# Patient Record
Sex: MALE | Race: WHITE | Employment: UNEMPLOYED | URBAN - METROPOLITAN AREA
[De-identification: names, ages, dates, MRNs, and addresses within clinical notes are randomized per-mention and may not be internally consistent; named-entity substitution may affect disease eponyms.]

---

## 2021-01-01 ENCOUNTER — OFFICE VISIT (OUTPATIENT)
Dept: PULMONOLOGY | Age: 86
End: 2021-01-01
Payer: MEDICARE

## 2021-01-01 ENCOUNTER — APPOINTMENT (OUTPATIENT)
Dept: ULTRASOUND IMAGING | Age: 86
DRG: 377 | End: 2021-01-01
Payer: MEDICARE

## 2021-01-01 ENCOUNTER — APPOINTMENT (OUTPATIENT)
Dept: GENERAL RADIOLOGY | Age: 86
DRG: 377 | End: 2021-01-01
Payer: MEDICARE

## 2021-01-01 ENCOUNTER — ANESTHESIA EVENT (OUTPATIENT)
Dept: ENDOSCOPY | Age: 86
DRG: 377 | End: 2021-01-01
Payer: MEDICARE

## 2021-01-01 ENCOUNTER — HOSPITAL ENCOUNTER (INPATIENT)
Age: 86
LOS: 4 days | Discharge: HOSPICE | DRG: 640 | End: 2021-05-31
Attending: EMERGENCY MEDICINE | Admitting: INTERNAL MEDICINE
Payer: MEDICARE

## 2021-01-01 ENCOUNTER — APPOINTMENT (OUTPATIENT)
Dept: ULTRASOUND IMAGING | Age: 86
DRG: 640 | End: 2021-01-01
Payer: MEDICARE

## 2021-01-01 ENCOUNTER — APPOINTMENT (OUTPATIENT)
Dept: GENERAL RADIOLOGY | Age: 86
DRG: 871 | End: 2021-01-01
Payer: MEDICARE

## 2021-01-01 ENCOUNTER — NURSE ONLY (OUTPATIENT)
Dept: CARDIOLOGY CLINIC | Age: 86
End: 2021-01-01
Payer: MEDICARE

## 2021-01-01 ENCOUNTER — TELEPHONE (OUTPATIENT)
Dept: PULMONOLOGY | Age: 86
End: 2021-01-01

## 2021-01-01 ENCOUNTER — HOSPITAL ENCOUNTER (OUTPATIENT)
Age: 86
Discharge: HOME | End: 2021-04-20
Payer: MEDICARE

## 2021-01-01 ENCOUNTER — HOSPITAL ENCOUNTER (OUTPATIENT)
Dept: GENERAL RADIOLOGY | Age: 86
End: 2021-01-01
Payer: MEDICARE

## 2021-01-01 ENCOUNTER — HOSPITAL ENCOUNTER (OUTPATIENT)
Dept: GENERAL RADIOLOGY | Age: 86
Discharge: HOME | End: 2021-04-27
Payer: MEDICARE

## 2021-01-01 ENCOUNTER — HOSPITAL ENCOUNTER (OUTPATIENT)
Dept: WOMENS IMAGING | Age: 86
Discharge: HOME | End: 2021-04-22
Payer: MEDICARE

## 2021-01-01 ENCOUNTER — APPOINTMENT (OUTPATIENT)
Dept: CT IMAGING | Age: 86
DRG: 640 | End: 2021-01-01
Payer: MEDICARE

## 2021-01-01 ENCOUNTER — HOSPITAL ENCOUNTER (INPATIENT)
Age: 86
LOS: 5 days | Discharge: HOME | DRG: 377 | End: 2021-03-01
Attending: EMERGENCY MEDICINE | Admitting: FAMILY MEDICINE
Payer: MEDICARE

## 2021-01-01 ENCOUNTER — OFFICE VISIT (OUTPATIENT)
Dept: CARDIOLOGY CLINIC | Age: 86
End: 2021-01-01
Payer: MEDICARE

## 2021-01-01 ENCOUNTER — APPOINTMENT (OUTPATIENT)
Dept: CT IMAGING | Age: 86
DRG: 871 | End: 2021-01-01
Payer: MEDICARE

## 2021-01-01 ENCOUNTER — TELEPHONE (OUTPATIENT)
Dept: OTHER | Facility: CLINIC | Age: 86
End: 2021-01-01

## 2021-01-01 ENCOUNTER — HOSPITAL ENCOUNTER (INPATIENT)
Age: 86
LOS: 8 days | Discharge: HOME | DRG: 871 | End: 2021-05-13
Attending: EMERGENCY MEDICINE | Admitting: INTERNAL MEDICINE
Payer: MEDICARE

## 2021-01-01 ENCOUNTER — HOSPITAL ENCOUNTER (OUTPATIENT)
Dept: GENERAL RADIOLOGY | Age: 86
Discharge: HOME | End: 2021-03-09
Payer: MEDICARE

## 2021-01-01 ENCOUNTER — CARE COORDINATION (OUTPATIENT)
Dept: CASE MANAGEMENT | Age: 86
End: 2021-01-01

## 2021-01-01 ENCOUNTER — HOSPITAL ENCOUNTER (OUTPATIENT)
Dept: GENERAL RADIOLOGY | Age: 86
Discharge: HOME | End: 2021-04-20
Payer: MEDICARE

## 2021-01-01 ENCOUNTER — ANESTHESIA (OUTPATIENT)
Dept: ENDOSCOPY | Age: 86
DRG: 377 | End: 2021-01-01
Payer: MEDICARE

## 2021-01-01 ENCOUNTER — CLINICAL DOCUMENTATION (OUTPATIENT)
Dept: OTHER | Age: 86
End: 2021-01-01

## 2021-01-01 ENCOUNTER — FOLLOWUP TELEPHONE ENCOUNTER (OUTPATIENT)
Dept: MEDSURG UNIT | Age: 86
End: 2021-01-01

## 2021-01-01 VITALS
RESPIRATION RATE: 18 BRPM | OXYGEN SATURATION: 96 % | TEMPERATURE: 96.2 F | HEIGHT: 65 IN | BODY MASS INDEX: 18.8 KG/M2 | HEART RATE: 66 BPM | DIASTOLIC BLOOD PRESSURE: 78 MMHG | SYSTOLIC BLOOD PRESSURE: 163 MMHG

## 2021-01-01 VITALS
HEART RATE: 63 BPM | WEIGHT: 130.07 LBS | HEIGHT: 65 IN | TEMPERATURE: 98.1 F | OXYGEN SATURATION: 97 % | BODY MASS INDEX: 21.67 KG/M2 | DIASTOLIC BLOOD PRESSURE: 67 MMHG | RESPIRATION RATE: 16 BRPM | SYSTOLIC BLOOD PRESSURE: 150 MMHG

## 2021-01-01 VITALS — OXYGEN SATURATION: 96 % | SYSTOLIC BLOOD PRESSURE: 87 MMHG | DIASTOLIC BLOOD PRESSURE: 46 MMHG

## 2021-01-01 VITALS
BODY MASS INDEX: 21.24 KG/M2 | DIASTOLIC BLOOD PRESSURE: 64 MMHG | HEIGHT: 65 IN | OXYGEN SATURATION: 90 % | SYSTOLIC BLOOD PRESSURE: 126 MMHG | WEIGHT: 127.5 LBS | HEART RATE: 60 BPM

## 2021-01-01 VITALS
DIASTOLIC BLOOD PRESSURE: 82 MMHG | HEIGHT: 65 IN | SYSTOLIC BLOOD PRESSURE: 159 MMHG | HEART RATE: 86 BPM | WEIGHT: 113 LBS | RESPIRATION RATE: 16 BRPM | OXYGEN SATURATION: 94 % | TEMPERATURE: 97.1 F | BODY MASS INDEX: 18.83 KG/M2

## 2021-01-01 VITALS
SYSTOLIC BLOOD PRESSURE: 153 MMHG | OXYGEN SATURATION: 97 % | HEART RATE: 61 BPM | DIASTOLIC BLOOD PRESSURE: 67 MMHG | WEIGHT: 139.4 LBS | BODY MASS INDEX: 23.22 KG/M2 | HEIGHT: 65 IN

## 2021-01-01 DIAGNOSIS — I10 ESSENTIAL HYPERTENSION: ICD-10-CM

## 2021-01-01 DIAGNOSIS — K92.2 GASTROINTESTINAL HEMORRHAGE, UNSPECIFIED GASTROINTESTINAL HEMORRHAGE TYPE: Primary | ICD-10-CM

## 2021-01-01 DIAGNOSIS — T83.511A URINARY TRACT INFECTION ASSOCIATED WITH INDWELLING URETHRAL CATHETER, INITIAL ENCOUNTER (HCC): ICD-10-CM

## 2021-01-01 DIAGNOSIS — M81.0 OSTEOPOROSIS, UNSPECIFIED OSTEOPOROSIS TYPE, UNSPECIFIED PATHOLOGICAL FRACTURE PRESENCE: ICD-10-CM

## 2021-01-01 DIAGNOSIS — A41.9 SEVERE SEPSIS (HCC): Primary | ICD-10-CM

## 2021-01-01 DIAGNOSIS — J18.9 MULTIFOCAL PNEUMONIA: ICD-10-CM

## 2021-01-01 DIAGNOSIS — R05.9 COUGH: ICD-10-CM

## 2021-01-01 DIAGNOSIS — R41.82 ALTERED MENTAL STATUS, UNSPECIFIED ALTERED MENTAL STATUS TYPE: Primary | ICD-10-CM

## 2021-01-01 DIAGNOSIS — N18.9 CHRONIC RENAL FAILURE, UNSPECIFIED CKD STAGE: ICD-10-CM

## 2021-01-01 DIAGNOSIS — I50.9 ACUTE ON CHRONIC CONGESTIVE HEART FAILURE, UNSPECIFIED HEART FAILURE TYPE (HCC): ICD-10-CM

## 2021-01-01 DIAGNOSIS — N17.9 ACUTE RENAL FAILURE, UNSPECIFIED ACUTE RENAL FAILURE TYPE (HCC): ICD-10-CM

## 2021-01-01 DIAGNOSIS — J18.9 PNEUMONIA DUE TO INFECTIOUS ORGANISM, UNSPECIFIED LATERALITY, UNSPECIFIED PART OF LUNG: ICD-10-CM

## 2021-01-01 DIAGNOSIS — R93.89 INFILTRATE NOTED ON IMAGING STUDY: ICD-10-CM

## 2021-01-01 DIAGNOSIS — R41.89 UNRESPONSIVE STATE: ICD-10-CM

## 2021-01-01 DIAGNOSIS — G89.29 CHRONIC LOW BACK PAIN, UNSPECIFIED BACK PAIN LATERALITY, UNSPECIFIED WHETHER SCIATICA PRESENT: ICD-10-CM

## 2021-01-01 DIAGNOSIS — I27.20 PULMONARY HTN (HCC): ICD-10-CM

## 2021-01-01 DIAGNOSIS — Z95.0 PACEMAKER: Primary | ICD-10-CM

## 2021-01-01 DIAGNOSIS — R65.20 SEVERE SEPSIS (HCC): Primary | ICD-10-CM

## 2021-01-01 DIAGNOSIS — R06.02 SOB (SHORTNESS OF BREATH): ICD-10-CM

## 2021-01-01 DIAGNOSIS — I25.10 CORONARY ARTERY DISEASE INVOLVING NATIVE CORONARY ARTERY OF NATIVE HEART WITHOUT ANGINA PECTORIS: Primary | ICD-10-CM

## 2021-01-01 DIAGNOSIS — N39.0 URINARY TRACT INFECTION ASSOCIATED WITH INDWELLING URETHRAL CATHETER, INITIAL ENCOUNTER (HCC): ICD-10-CM

## 2021-01-01 DIAGNOSIS — R77.8 ELEVATED TROPONIN: ICD-10-CM

## 2021-01-01 DIAGNOSIS — M54.50 CHRONIC LOW BACK PAIN, UNSPECIFIED BACK PAIN LATERALITY, UNSPECIFIED WHETHER SCIATICA PRESENT: ICD-10-CM

## 2021-01-01 DIAGNOSIS — R93.89 INFILTRATE NOTED ON IMAGING STUDY: Primary | ICD-10-CM

## 2021-01-01 DIAGNOSIS — E83.52 HYPERCALCEMIA: ICD-10-CM

## 2021-01-01 LAB
A/G RATIO: 0.7 (ref 1.1–2.2)
A/G RATIO: 0.8 (ref 1.1–2.2)
A/G RATIO: 1.3 (ref 1.1–2.2)
ABO/RH: NORMAL
ALBUMIN SERPL-MCNC: 2.7 G/DL (ref 3.4–5)
ALBUMIN SERPL-MCNC: 2.9 G/DL (ref 3.1–4.9)
ALBUMIN SERPL-MCNC: 2.9 G/DL (ref 3.4–5)
ALBUMIN SERPL-MCNC: 3.3 G/DL (ref 3.4–5)
ALBUMIN SERPL-MCNC: 3.3 G/DL (ref 3.4–5)
ALBUMIN SERPL-MCNC: 3.4 G/DL (ref 3.4–5)
ALBUMIN SERPL-MCNC: 3.4 G/DL (ref 3.4–5)
ALBUMIN SERPL-MCNC: 3.6 G/DL (ref 3.4–5)
ALBUMIN SERPL-MCNC: 3.7 G/DL (ref 3.4–5)
ALBUMIN SERPL-MCNC: 3.7 G/DL (ref 3.4–5)
ALP BLD-CCNC: 57 U/L (ref 40–129)
ALP BLD-CCNC: 70 U/L (ref 40–129)
ALP BLD-CCNC: 86 U/L (ref 40–129)
ALPHA-1-GLOBULIN: 0.4 G/DL (ref 0.2–0.4)
ALPHA-2-GLOBULIN: 1 G/DL (ref 0.4–1.1)
ALT SERPL-CCNC: 13 U/L (ref 10–40)
ALT SERPL-CCNC: 16 U/L (ref 10–40)
ALT SERPL-CCNC: 29 U/L (ref 10–40)
AMPHETAMINE SCREEN, URINE: NORMAL
ANION GAP SERPL CALCULATED.3IONS-SCNC: 10 MMOL/L (ref 3–16)
ANION GAP SERPL CALCULATED.3IONS-SCNC: 11 MMOL/L (ref 3–16)
ANION GAP SERPL CALCULATED.3IONS-SCNC: 11 MMOL/L (ref 3–16)
ANION GAP SERPL CALCULATED.3IONS-SCNC: 12 MMOL/L (ref 3–16)
ANION GAP SERPL CALCULATED.3IONS-SCNC: 13 MMOL/L (ref 3–16)
ANION GAP SERPL CALCULATED.3IONS-SCNC: 13 MMOL/L (ref 3–16)
ANION GAP SERPL CALCULATED.3IONS-SCNC: 14 MMOL/L (ref 3–16)
ANION GAP SERPL CALCULATED.3IONS-SCNC: 15 MMOL/L (ref 3–16)
ANION GAP SERPL CALCULATED.3IONS-SCNC: 15 MMOL/L (ref 3–16)
ANION GAP SERPL CALCULATED.3IONS-SCNC: 16 MMOL/L (ref 3–16)
ANION GAP SERPL CALCULATED.3IONS-SCNC: 17 MMOL/L (ref 3–16)
ANION GAP SERPL CALCULATED.3IONS-SCNC: 9 MMOL/L (ref 3–16)
ANTIBODY SCREEN: NORMAL
APTT: 30.1 SEC (ref 24.2–36.2)
AST SERPL-CCNC: 31 U/L (ref 15–37)
AST SERPL-CCNC: 38 U/L (ref 15–37)
AST SERPL-CCNC: 44 U/L (ref 15–37)
BACTERIA: ABNORMAL /HPF
BACTERIA: ABNORMAL /HPF
BARBITURATE SCREEN URINE: NORMAL
BASE EXCESS VENOUS: -0.8 MMOL/L (ref -3–3)
BASOPHILS ABSOLUTE: 0 K/UL (ref 0–0.2)
BASOPHILS ABSOLUTE: 0.1 K/UL (ref 0–0.2)
BASOPHILS RELATIVE PERCENT: 0.3 %
BASOPHILS RELATIVE PERCENT: 0.4 %
BASOPHILS RELATIVE PERCENT: 0.4 %
BASOPHILS RELATIVE PERCENT: 0.6 %
BASOPHILS RELATIVE PERCENT: 0.7 %
BASOPHILS RELATIVE PERCENT: 0.8 %
BASOPHILS RELATIVE PERCENT: 0.9 %
BASOPHILS RELATIVE PERCENT: 1 %
BASOPHILS RELATIVE PERCENT: 1.3 %
BENZODIAZEPINE SCREEN, URINE: NORMAL
BETA GLOBULIN: 2.1 G/DL (ref 0.9–1.6)
BILIRUB SERPL-MCNC: 0.4 MG/DL (ref 0–1)
BILIRUB SERPL-MCNC: 0.4 MG/DL (ref 0–1)
BILIRUB SERPL-MCNC: 0.5 MG/DL (ref 0–1)
BILIRUBIN URINE: NEGATIVE
BLOOD BANK DISPENSE STATUS: NORMAL
BLOOD BANK DISPENSE STATUS: NORMAL
BLOOD BANK PRODUCT CODE: NORMAL
BLOOD BANK PRODUCT CODE: NORMAL
BLOOD CULTURE, ROUTINE: NORMAL
BLOOD CULTURE, ROUTINE: NORMAL
BLOOD, URINE: ABNORMAL
BPU ID: NORMAL
BPU ID: NORMAL
BUN BLDV-MCNC: 42 MG/DL (ref 7–20)
BUN BLDV-MCNC: 45 MG/DL (ref 7–20)
BUN BLDV-MCNC: 46 MG/DL (ref 7–20)
BUN BLDV-MCNC: 50 MG/DL (ref 7–20)
BUN BLDV-MCNC: 51 MG/DL (ref 7–20)
BUN BLDV-MCNC: 51 MG/DL (ref 7–20)
BUN BLDV-MCNC: 52 MG/DL (ref 7–20)
BUN BLDV-MCNC: 54 MG/DL (ref 7–20)
BUN BLDV-MCNC: 55 MG/DL (ref 7–20)
BUN BLDV-MCNC: 57 MG/DL (ref 7–20)
BUN BLDV-MCNC: 58 MG/DL (ref 7–20)
BUN BLDV-MCNC: 61 MG/DL (ref 7–20)
BUN BLDV-MCNC: 63 MG/DL (ref 7–20)
BUN BLDV-MCNC: 63 MG/DL (ref 7–20)
BUN BLDV-MCNC: 66 MG/DL (ref 7–20)
BUN BLDV-MCNC: 67 MG/DL (ref 7–20)
BUN BLDV-MCNC: 68 MG/DL (ref 7–20)
BUN BLDV-MCNC: 69 MG/DL (ref 7–20)
BUN BLDV-MCNC: 69 MG/DL (ref 7–20)
BUN BLDV-MCNC: 70 MG/DL (ref 7–20)
C-REACTIVE PROTEIN: 144 MG/L (ref 0–5.1)
CALCIUM IONIZED: 1.35 MMOL/L (ref 1.12–1.32)
CALCIUM IONIZED: 1.39 MMOL/L (ref 1.12–1.32)
CALCIUM IONIZED: 1.59 MMOL/L (ref 1.12–1.32)
CALCIUM SERPL-MCNC: 10.2 MG/DL (ref 8.3–10.6)
CALCIUM SERPL-MCNC: 10.4 MG/DL (ref 8.3–10.6)
CALCIUM SERPL-MCNC: 10.6 MG/DL (ref 8.3–10.6)
CALCIUM SERPL-MCNC: 10.6 MG/DL (ref 8.3–10.6)
CALCIUM SERPL-MCNC: 10.8 MG/DL (ref 8.3–10.6)
CALCIUM SERPL-MCNC: 11.1 MG/DL (ref 8.3–10.6)
CALCIUM SERPL-MCNC: 11.7 MG/DL (ref 8.3–10.6)
CALCIUM SERPL-MCNC: 11.7 MG/DL (ref 8.3–10.6)
CALCIUM SERPL-MCNC: 11.9 MG/DL (ref 8.3–10.6)
CALCIUM SERPL-MCNC: 11.9 MG/DL (ref 8.3–10.6)
CALCIUM SERPL-MCNC: 12.2 MG/DL (ref 8.3–10.6)
CALCIUM SERPL-MCNC: 12.3 MG/DL (ref 8.3–10.6)
CALCIUM SERPL-MCNC: 14.4 MG/DL (ref 8.3–10.6)
CALCIUM SERPL-MCNC: 8.6 MG/DL (ref 8.3–10.6)
CALCIUM SERPL-MCNC: 8.6 MG/DL (ref 8.3–10.6)
CALCIUM SERPL-MCNC: 8.8 MG/DL (ref 8.3–10.6)
CALCIUM SERPL-MCNC: 8.9 MG/DL (ref 8.3–10.6)
CALCIUM SERPL-MCNC: 9.3 MG/DL (ref 8.3–10.6)
CANNABINOID SCREEN URINE: NORMAL
CARBOXYHEMOGLOBIN: 1.6 % (ref 0–1.5)
CHLORIDE BLD-SCNC: 101 MMOL/L (ref 99–110)
CHLORIDE BLD-SCNC: 102 MMOL/L (ref 99–110)
CHLORIDE BLD-SCNC: 103 MMOL/L (ref 99–110)
CHLORIDE BLD-SCNC: 104 MMOL/L (ref 99–110)
CHLORIDE BLD-SCNC: 105 MMOL/L (ref 99–110)
CHLORIDE BLD-SCNC: 107 MMOL/L (ref 99–110)
CHLORIDE BLD-SCNC: 115 MMOL/L (ref 99–110)
CHLORIDE BLD-SCNC: 119 MMOL/L (ref 99–110)
CHLORIDE BLD-SCNC: 120 MMOL/L (ref 99–110)
CHLORIDE BLD-SCNC: 96 MMOL/L (ref 99–110)
CHLORIDE BLD-SCNC: 98 MMOL/L (ref 99–110)
CLARITY: ABNORMAL
CLARITY: ABNORMAL
CLARITY: CLEAR
CO2: 19 MMOL/L (ref 21–32)
CO2: 19 MMOL/L (ref 21–32)
CO2: 20 MMOL/L (ref 21–32)
CO2: 22 MMOL/L (ref 21–32)
CO2: 23 MMOL/L (ref 21–32)
CO2: 24 MMOL/L (ref 21–32)
CO2: 24 MMOL/L (ref 21–32)
CO2: 25 MMOL/L (ref 21–32)
CO2: 26 MMOL/L (ref 21–32)
CO2: 27 MMOL/L (ref 21–32)
CO2: 28 MMOL/L (ref 21–32)
CO2: 29 MMOL/L (ref 21–32)
COCAINE METABOLITE SCREEN URINE: NORMAL
COLOR: ABNORMAL
COLOR: YELLOW
COLOR: YELLOW
CREAT SERPL-MCNC: 3 MG/DL (ref 0.8–1.3)
CREAT SERPL-MCNC: 3 MG/DL (ref 0.8–1.3)
CREAT SERPL-MCNC: 3.1 MG/DL (ref 0.8–1.3)
CREAT SERPL-MCNC: 3.2 MG/DL (ref 0.8–1.3)
CREAT SERPL-MCNC: 3.3 MG/DL (ref 0.8–1.3)
CREAT SERPL-MCNC: 3.6 MG/DL (ref 0.8–1.3)
CREAT SERPL-MCNC: 3.7 MG/DL (ref 0.8–1.3)
CREAT SERPL-MCNC: 3.7 MG/DL (ref 0.8–1.3)
CREAT SERPL-MCNC: 4 MG/DL (ref 0.8–1.3)
CREAT SERPL-MCNC: 4.1 MG/DL (ref 0.8–1.3)
CREAT SERPL-MCNC: 4.2 MG/DL (ref 0.8–1.3)
CREAT SERPL-MCNC: 4.2 MG/DL (ref 0.8–1.3)
CREATININE URINE: 61 MG/DL (ref 39–259)
CREATININE URINE: 65 MG/DL (ref 39–259)
CULTURE, BLOOD 2: NORMAL
CULTURE, BLOOD 2: NORMAL
D DIMER: 900 NG/ML DDU (ref 0–229)
DESCRIPTION BLOOD BANK: NORMAL
DESCRIPTION BLOOD BANK: NORMAL
EKG ATRIAL RATE: 67 BPM
EKG ATRIAL RATE: 88 BPM
EKG DIAGNOSIS: NORMAL
EKG DIAGNOSIS: NORMAL
EKG P AXIS: 15 DEGREES
EKG P-R INTERVAL: 148 MS
EKG Q-T INTERVAL: 408 MS
EKG Q-T INTERVAL: 474 MS
EKG QRS DURATION: 140 MS
EKG QRS DURATION: 158 MS
EKG QTC CALCULATION (BAZETT): 488 MS
EKG QTC CALCULATION (BAZETT): 500 MS
EKG R AXIS: -85 DEGREES
EKG R AXIS: 266 DEGREES
EKG T AXIS: 34 DEGREES
EKG T AXIS: 83 DEGREES
EKG VENTRICULAR RATE: 67 BPM
EKG VENTRICULAR RATE: 86 BPM
EOSINOPHILS ABSOLUTE: 0 K/UL (ref 0–0.6)
EOSINOPHILS ABSOLUTE: 0.1 K/UL (ref 0–0.6)
EOSINOPHILS RELATIVE PERCENT: 0 %
EOSINOPHILS RELATIVE PERCENT: 0 %
EOSINOPHILS RELATIVE PERCENT: 0.2 %
EOSINOPHILS RELATIVE PERCENT: 0.6 %
EOSINOPHILS RELATIVE PERCENT: 0.7 %
EOSINOPHILS RELATIVE PERCENT: 0.8 %
EOSINOPHILS RELATIVE PERCENT: 0.9 %
EOSINOPHILS RELATIVE PERCENT: 1.1 %
EOSINOPHILS RELATIVE PERCENT: 1.8 %
EPITHELIAL CELLS, UA: ABNORMAL /HPF (ref 0–5)
FERRITIN: 758 NG/ML (ref 30–400)
FIBRINOGEN: 577 MG/DL (ref 200–397)
GAMMA GLOBULIN: 0.7 G/DL (ref 0.6–1.8)
GFR AFRICAN AMERICAN: 16
GFR AFRICAN AMERICAN: 17
GFR AFRICAN AMERICAN: 19
GFR AFRICAN AMERICAN: 21
GFR AFRICAN AMERICAN: 22
GFR AFRICAN AMERICAN: 23
GFR AFRICAN AMERICAN: 24
GFR AFRICAN AMERICAN: 24
GFR NON-AFRICAN AMERICAN: 13
GFR NON-AFRICAN AMERICAN: 13
GFR NON-AFRICAN AMERICAN: 14
GFR NON-AFRICAN AMERICAN: 14
GFR NON-AFRICAN AMERICAN: 15
GFR NON-AFRICAN AMERICAN: 15
GFR NON-AFRICAN AMERICAN: 16
GFR NON-AFRICAN AMERICAN: 17
GFR NON-AFRICAN AMERICAN: 18
GFR NON-AFRICAN AMERICAN: 19
GFR NON-AFRICAN AMERICAN: 20
GFR NON-AFRICAN AMERICAN: 20
GLOBULIN: 2.9 G/DL
GLOBULIN: 4.5 G/DL
GLOBULIN: 4.8 G/DL
GLUCOSE BLD-MCNC: 101 MG/DL (ref 70–99)
GLUCOSE BLD-MCNC: 104 MG/DL (ref 70–99)
GLUCOSE BLD-MCNC: 107 MG/DL (ref 70–99)
GLUCOSE BLD-MCNC: 109 MG/DL (ref 70–99)
GLUCOSE BLD-MCNC: 112 MG/DL (ref 70–99)
GLUCOSE BLD-MCNC: 117 MG/DL (ref 70–99)
GLUCOSE BLD-MCNC: 117 MG/DL (ref 70–99)
GLUCOSE BLD-MCNC: 119 MG/DL (ref 70–99)
GLUCOSE BLD-MCNC: 125 MG/DL (ref 70–99)
GLUCOSE BLD-MCNC: 134 MG/DL (ref 70–99)
GLUCOSE BLD-MCNC: 135 MG/DL (ref 70–99)
GLUCOSE BLD-MCNC: 136 MG/DL (ref 70–99)
GLUCOSE BLD-MCNC: 136 MG/DL (ref 70–99)
GLUCOSE BLD-MCNC: 137 MG/DL (ref 70–99)
GLUCOSE BLD-MCNC: 149 MG/DL (ref 70–99)
GLUCOSE BLD-MCNC: 162 MG/DL (ref 70–99)
GLUCOSE BLD-MCNC: 172 MG/DL (ref 70–99)
GLUCOSE BLD-MCNC: 213 MG/DL (ref 70–99)
GLUCOSE BLD-MCNC: 91 MG/DL (ref 70–99)
GLUCOSE BLD-MCNC: 92 MG/DL (ref 70–99)
GLUCOSE URINE: NEGATIVE MG/DL
HCO3 VENOUS: 23.5 MMOL/L (ref 23–29)
HCT VFR BLD CALC: 18.6 % (ref 40.5–52.5)
HCT VFR BLD CALC: 22.4 % (ref 40.5–52.5)
HCT VFR BLD CALC: 23.2 % (ref 40.5–52.5)
HCT VFR BLD CALC: 23.8 % (ref 40.5–52.5)
HCT VFR BLD CALC: 23.9 % (ref 40.5–52.5)
HCT VFR BLD CALC: 24.2 % (ref 40.5–52.5)
HCT VFR BLD CALC: 24.2 % (ref 40.5–52.5)
HCT VFR BLD CALC: 24.3 % (ref 40.5–52.5)
HCT VFR BLD CALC: 24.3 % (ref 40.5–52.5)
HCT VFR BLD CALC: 24.4 % (ref 40.5–52.5)
HCT VFR BLD CALC: 24.6 % (ref 40.5–52.5)
HCT VFR BLD CALC: 25 % (ref 40.5–52.5)
HCT VFR BLD CALC: 26.9 % (ref 40.5–52.5)
HCT VFR BLD CALC: 27.5 % (ref 40.5–52.5)
HCT VFR BLD CALC: 28.9 % (ref 40.5–52.5)
HCT VFR BLD CALC: 29.3 % (ref 40.5–52.5)
HCT VFR BLD CALC: 29.6 % (ref 40.5–52.5)
HCT VFR BLD CALC: 29.8 % (ref 40.5–52.5)
HCT VFR BLD CALC: 30.4 % (ref 40.5–52.5)
HCT VFR BLD CALC: 31 % (ref 40.5–52.5)
HCT VFR BLD CALC: 31.8 % (ref 40.5–52.5)
HCT VFR BLD CALC: 31.9 % (ref 40.5–52.5)
HCT VFR BLD CALC: 33.3 % (ref 40.5–52.5)
HEMOGLOBIN: 10.2 G/DL (ref 13.5–17.5)
HEMOGLOBIN: 10.4 G/DL (ref 13.5–17.5)
HEMOGLOBIN: 10.4 G/DL (ref 13.5–17.5)
HEMOGLOBIN: 10.8 G/DL (ref 13.5–17.5)
HEMOGLOBIN: 6 G/DL (ref 13.5–17.5)
HEMOGLOBIN: 7.5 G/DL (ref 13.5–17.5)
HEMOGLOBIN: 7.8 G/DL (ref 13.5–17.5)
HEMOGLOBIN: 7.9 G/DL (ref 13.5–17.5)
HEMOGLOBIN: 8 G/DL (ref 13.5–17.5)
HEMOGLOBIN: 8.1 G/DL (ref 13.5–17.5)
HEMOGLOBIN: 8.2 G/DL (ref 13.5–17.5)
HEMOGLOBIN: 8.3 G/DL (ref 13.5–17.5)
HEMOGLOBIN: 9 G/DL (ref 13.5–17.5)
HEMOGLOBIN: 9.1 G/DL (ref 13.5–17.5)
HEMOGLOBIN: 9.6 G/DL (ref 13.5–17.5)
HEMOGLOBIN: 9.6 G/DL (ref 13.5–17.5)
HEMOGLOBIN: 9.7 G/DL (ref 13.5–17.5)
HEMOGLOBIN: 9.8 G/DL (ref 13.5–17.5)
HEMOGLOBIN: 9.9 G/DL (ref 13.5–17.5)
IGA: 1671 MG/DL (ref 70–400)
IGG: 723 MG/DL (ref 700–1600)
IGM: 18 MG/DL (ref 40–230)
INR BLD: 1.09 (ref 0.86–1.14)
IRON SATURATION: 14 % (ref 20–50)
IRON SATURATION: 37 % (ref 20–50)
IRON: 21 UG/DL (ref 59–158)
IRON: 63 UG/DL (ref 59–158)
KAPPA, FREE LIGHT CHAINS, SERUM: 53.42 MG/L (ref 3.3–19.4)
KAPPA/LAMBDA RATIO: 0.95 (ref 0.26–1.65)
KAPPA/LAMBDA TEST COMMENT: ABNORMAL
KETONES, URINE: ABNORMAL MG/DL
KETONES, URINE: NEGATIVE MG/DL
KETONES, URINE: NEGATIVE MG/DL
L. PNEUMOPHILA SEROGP 1 UR AG: NORMAL
L. PNEUMOPHILA SEROGP 1 UR AG: NORMAL
LACTATE DEHYDROGENASE: 232 U/L (ref 100–190)
LACTIC ACID, SEPSIS: 2.2 MMOL/L (ref 0.4–1.9)
LACTIC ACID, SEPSIS: 2.7 MMOL/L (ref 0.4–1.9)
LACTIC ACID: 1.4 MMOL/L (ref 0.4–2)
LACTIC ACID: 1.4 MMOL/L (ref 0.4–2)
LACTIC ACID: 2.4 MMOL/L (ref 0.4–2)
LAMBDA, FREE LIGHT CHAINS, SERUM: 56.06 MG/L (ref 5.71–26.3)
LEUKOCYTE ESTERASE, URINE: ABNORMAL
LV EF: 58 %
LVEF MODALITY: NORMAL
LYMPHOCYTES ABSOLUTE: 0.4 K/UL (ref 1–5.1)
LYMPHOCYTES ABSOLUTE: 0.5 K/UL (ref 1–5.1)
LYMPHOCYTES ABSOLUTE: 0.6 K/UL (ref 1–5.1)
LYMPHOCYTES ABSOLUTE: 0.7 K/UL (ref 1–5.1)
LYMPHOCYTES ABSOLUTE: 0.7 K/UL (ref 1–5.1)
LYMPHOCYTES ABSOLUTE: 0.8 K/UL (ref 1–5.1)
LYMPHOCYTES ABSOLUTE: 0.9 K/UL (ref 1–5.1)
LYMPHOCYTES ABSOLUTE: 0.9 K/UL (ref 1–5.1)
LYMPHOCYTES ABSOLUTE: 1 K/UL (ref 1–5.1)
LYMPHOCYTES RELATIVE PERCENT: 10 %
LYMPHOCYTES RELATIVE PERCENT: 10.2 %
LYMPHOCYTES RELATIVE PERCENT: 10.4 %
LYMPHOCYTES RELATIVE PERCENT: 11.4 %
LYMPHOCYTES RELATIVE PERCENT: 5.3 %
LYMPHOCYTES RELATIVE PERCENT: 5.4 %
LYMPHOCYTES RELATIVE PERCENT: 6.8 %
LYMPHOCYTES RELATIVE PERCENT: 8.6 %
LYMPHOCYTES RELATIVE PERCENT: 9.1 %
Lab: NORMAL
M SPIKE 1 SERUM PROTEIN ELEC: 1.3 G/DL
MCH RBC QN AUTO: 31.1 PG (ref 26–34)
MCH RBC QN AUTO: 31.4 PG (ref 26–34)
MCH RBC QN AUTO: 31.7 PG (ref 26–34)
MCH RBC QN AUTO: 31.7 PG (ref 26–34)
MCH RBC QN AUTO: 31.8 PG (ref 26–34)
MCH RBC QN AUTO: 31.9 PG (ref 26–34)
MCH RBC QN AUTO: 32 PG (ref 26–34)
MCH RBC QN AUTO: 32 PG (ref 26–34)
MCH RBC QN AUTO: 32.6 PG (ref 26–34)
MCH RBC QN AUTO: 32.8 PG (ref 26–34)
MCH RBC QN AUTO: 34.1 PG (ref 26–34)
MCHC RBC AUTO-ENTMCNC: 32.4 G/DL (ref 31–36)
MCHC RBC AUTO-ENTMCNC: 32.4 G/DL (ref 31–36)
MCHC RBC AUTO-ENTMCNC: 32.5 G/DL (ref 31–36)
MCHC RBC AUTO-ENTMCNC: 32.6 G/DL (ref 31–36)
MCHC RBC AUTO-ENTMCNC: 32.7 G/DL (ref 31–36)
MCHC RBC AUTO-ENTMCNC: 32.9 G/DL (ref 31–36)
MCHC RBC AUTO-ENTMCNC: 33 G/DL (ref 31–36)
MCHC RBC AUTO-ENTMCNC: 33.1 G/DL (ref 31–36)
MCHC RBC AUTO-ENTMCNC: 33.2 G/DL (ref 31–36)
MCHC RBC AUTO-ENTMCNC: 33.8 G/DL (ref 31–36)
MCHC RBC AUTO-ENTMCNC: 34.1 G/DL (ref 31–36)
MCV RBC AUTO: 105.2 FL (ref 80–100)
MCV RBC AUTO: 93.8 FL (ref 80–100)
MCV RBC AUTO: 95.5 FL (ref 80–100)
MCV RBC AUTO: 95.8 FL (ref 80–100)
MCV RBC AUTO: 95.8 FL (ref 80–100)
MCV RBC AUTO: 96 FL (ref 80–100)
MCV RBC AUTO: 96 FL (ref 80–100)
MCV RBC AUTO: 96.4 FL (ref 80–100)
MCV RBC AUTO: 96.5 FL (ref 80–100)
MCV RBC AUTO: 96.8 FL (ref 80–100)
MCV RBC AUTO: 97.9 FL (ref 80–100)
MCV RBC AUTO: 98.1 FL (ref 80–100)
MCV RBC AUTO: 98.7 FL (ref 80–100)
METHADONE SCREEN, URINE: NORMAL
METHEMOGLOBIN VENOUS: 0.6 %
MICROSCOPIC EXAMINATION: YES
MONOCYTES ABSOLUTE: 0.4 K/UL (ref 0–1.3)
MONOCYTES ABSOLUTE: 0.4 K/UL (ref 0–1.3)
MONOCYTES ABSOLUTE: 0.5 K/UL (ref 0–1.3)
MONOCYTES ABSOLUTE: 0.6 K/UL (ref 0–1.3)
MONOCYTES ABSOLUTE: 0.6 K/UL (ref 0–1.3)
MONOCYTES ABSOLUTE: 0.7 K/UL (ref 0–1.3)
MONOCYTES ABSOLUTE: 0.8 K/UL (ref 0–1.3)
MONOCYTES ABSOLUTE: 0.9 K/UL (ref 0–1.3)
MONOCYTES ABSOLUTE: 0.9 K/UL (ref 0–1.3)
MONOCYTES RELATIVE PERCENT: 11.5 %
MONOCYTES RELATIVE PERCENT: 5 %
MONOCYTES RELATIVE PERCENT: 5.6 %
MONOCYTES RELATIVE PERCENT: 6.6 %
MONOCYTES RELATIVE PERCENT: 6.7 %
MONOCYTES RELATIVE PERCENT: 6.8 %
MONOCYTES RELATIVE PERCENT: 8.2 %
MONOCYTES RELATIVE PERCENT: 8.4 %
MONOCYTES RELATIVE PERCENT: 9.2 %
MRSA SCREEN RT-PCR: ABNORMAL
NEUTROPHILS ABSOLUTE: 11.4 K/UL (ref 1.7–7.7)
NEUTROPHILS ABSOLUTE: 4.9 K/UL (ref 1.7–7.7)
NEUTROPHILS ABSOLUTE: 4.9 K/UL (ref 1.7–7.7)
NEUTROPHILS ABSOLUTE: 6 K/UL (ref 1.7–7.7)
NEUTROPHILS ABSOLUTE: 6.4 K/UL (ref 1.7–7.7)
NEUTROPHILS ABSOLUTE: 6.7 K/UL (ref 1.7–7.7)
NEUTROPHILS ABSOLUTE: 7 K/UL (ref 1.7–7.7)
NEUTROPHILS ABSOLUTE: 7.3 K/UL (ref 1.7–7.7)
NEUTROPHILS ABSOLUTE: 7.7 K/UL (ref 1.7–7.7)
NEUTROPHILS RELATIVE PERCENT: 77.6 %
NEUTROPHILS RELATIVE PERCENT: 79.3 %
NEUTROPHILS RELATIVE PERCENT: 79.7 %
NEUTROPHILS RELATIVE PERCENT: 80.3 %
NEUTROPHILS RELATIVE PERCENT: 81.2 %
NEUTROPHILS RELATIVE PERCENT: 81.4 %
NEUTROPHILS RELATIVE PERCENT: 85.6 %
NEUTROPHILS RELATIVE PERCENT: 88.6 %
NEUTROPHILS RELATIVE PERCENT: 88.6 %
NITRITE, URINE: NEGATIVE
NITRITE, URINE: NEGATIVE
NITRITE, URINE: POSITIVE
O2 CONTENT, VEN: 10 VOL %
O2 SAT, VEN: 68 %
O2 THERAPY: ABNORMAL
OCCULT BLOOD SCREENING: ABNORMAL
OCCULT BLOOD SCREENING: ABNORMAL
OPIATE SCREEN URINE: NORMAL
ORGANISM: ABNORMAL
OXYCODONE URINE: NORMAL
PARATHYROID HORMONE INTACT: 39.9 PG/ML (ref 14–72)
PARATHYROID HORMONE INTACT: 49.9 PG/ML (ref 14–72)
PCO2, VEN: 37.8 MMHG (ref 40–50)
PDW BLD-RTO: 16.1 % (ref 12.4–15.4)
PDW BLD-RTO: 17.2 % (ref 12.4–15.4)
PDW BLD-RTO: 17.3 % (ref 12.4–15.4)
PDW BLD-RTO: 17.4 % (ref 12.4–15.4)
PDW BLD-RTO: 17.5 % (ref 12.4–15.4)
PDW BLD-RTO: 17.5 % (ref 12.4–15.4)
PDW BLD-RTO: 17.7 % (ref 12.4–15.4)
PDW BLD-RTO: 17.7 % (ref 12.4–15.4)
PDW BLD-RTO: 17.8 % (ref 12.4–15.4)
PDW BLD-RTO: 17.8 % (ref 12.4–15.4)
PDW BLD-RTO: 17.9 % (ref 12.4–15.4)
PDW BLD-RTO: 17.9 % (ref 12.4–15.4)
PDW BLD-RTO: 21.3 % (ref 12.4–15.4)
PH UA: 5 (ref 5–8)
PH UA: 5.5
PH UA: 5.5 (ref 5–8)
PH UA: 5.5 (ref 5–8)
PH VENOUS: 7.41 (ref 7.35–7.45)
PH VENOUS: 7.41 (ref 7.35–7.45)
PH VENOUS: 7.42 (ref 7.35–7.45)
PH VENOUS: 7.43 (ref 7.35–7.45)
PHENCYCLIDINE SCREEN URINE: NORMAL
PHOSPHORUS: 1.5 MG/DL (ref 2.5–4.9)
PHOSPHORUS: 2 MG/DL (ref 2.5–4.9)
PHOSPHORUS: 4.6 MG/DL (ref 2.5–4.9)
PHOSPHORUS: 4.6 MG/DL (ref 2.5–4.9)
PHOSPHORUS: 5.1 MG/DL (ref 2.5–4.9)
PHOSPHORUS: 5.7 MG/DL (ref 2.5–4.9)
PLATELET # BLD: 193 K/UL (ref 135–450)
PLATELET # BLD: 194 K/UL (ref 135–450)
PLATELET # BLD: 203 K/UL (ref 135–450)
PLATELET # BLD: 218 K/UL (ref 135–450)
PLATELET # BLD: 219 K/UL (ref 135–450)
PLATELET # BLD: 226 K/UL (ref 135–450)
PLATELET # BLD: 249 K/UL (ref 135–450)
PLATELET # BLD: 259 K/UL (ref 135–450)
PLATELET # BLD: 313 K/UL (ref 135–450)
PLATELET # BLD: 319 K/UL (ref 135–450)
PLATELET # BLD: 348 K/UL (ref 135–450)
PLATELET # BLD: 372 K/UL (ref 135–450)
PLATELET # BLD: 383 K/UL (ref 135–450)
PMV BLD AUTO: 8.4 FL (ref 5–10.5)
PMV BLD AUTO: 8.5 FL (ref 5–10.5)
PMV BLD AUTO: 8.6 FL (ref 5–10.5)
PMV BLD AUTO: 8.7 FL (ref 5–10.5)
PMV BLD AUTO: 8.8 FL (ref 5–10.5)
PMV BLD AUTO: 8.8 FL (ref 5–10.5)
PMV BLD AUTO: 8.9 FL (ref 5–10.5)
PMV BLD AUTO: 9.1 FL (ref 5–10.5)
PMV BLD AUTO: 9.1 FL (ref 5–10.5)
PMV BLD AUTO: 9.7 FL (ref 5–10.5)
PMV BLD AUTO: 9.7 FL (ref 5–10.5)
PO2, VEN: 36.9 MMHG (ref 25–40)
POTASSIUM REFLEX MAGNESIUM: 3.9 MMOL/L (ref 3.5–5.1)
POTASSIUM REFLEX MAGNESIUM: 4.6 MMOL/L (ref 3.5–5.1)
POTASSIUM REFLEX MAGNESIUM: 4.8 MMOL/L (ref 3.5–5.1)
POTASSIUM REFLEX MAGNESIUM: 4.8 MMOL/L (ref 3.5–5.1)
POTASSIUM REFLEX MAGNESIUM: 4.9 MMOL/L (ref 3.5–5.1)
POTASSIUM REFLEX MAGNESIUM: 5.2 MMOL/L (ref 3.5–5.1)
POTASSIUM REFLEX MAGNESIUM: 5.3 MMOL/L (ref 3.5–5.1)
POTASSIUM REFLEX MAGNESIUM: 6 MMOL/L (ref 3.5–5.1)
POTASSIUM SERPL-SCNC: 3.5 MMOL/L (ref 3.5–5.1)
POTASSIUM SERPL-SCNC: 3.5 MMOL/L (ref 3.5–5.1)
POTASSIUM SERPL-SCNC: 3.6 MMOL/L (ref 3.5–5.1)
POTASSIUM SERPL-SCNC: 3.6 MMOL/L (ref 3.5–5.1)
POTASSIUM SERPL-SCNC: 3.7 MMOL/L (ref 3.5–5.1)
POTASSIUM SERPL-SCNC: 3.8 MMOL/L (ref 3.5–5.1)
POTASSIUM SERPL-SCNC: 3.9 MMOL/L (ref 3.5–5.1)
POTASSIUM SERPL-SCNC: 4 MMOL/L (ref 3.5–5.1)
POTASSIUM SERPL-SCNC: 4.2 MMOL/L (ref 3.5–5.1)
POTASSIUM SERPL-SCNC: 4.8 MMOL/L (ref 3.5–5.1)
PRO-BNP: ABNORMAL PG/ML (ref 0–449)
PROCALCITONIN: 0.45 NG/ML (ref 0–0.15)
PROCALCITONIN: 0.57 NG/ML (ref 0–0.15)
PROPOXYPHENE SCREEN: NORMAL
PROTEIN UA: 100 MG/DL
PROTEIN UA: 100 MG/DL
PROTEIN UA: >=300 MG/DL
PROTHROMBIN TIME: 12.6 SEC (ref 10–13.2)
RBC # BLD: 1.76 M/UL (ref 4.2–5.9)
RBC # BLD: 2.49 M/UL (ref 4.2–5.9)
RBC # BLD: 2.56 M/UL (ref 4.2–5.9)
RBC # BLD: 2.59 M/UL (ref 4.2–5.9)
RBC # BLD: 2.8 M/UL (ref 4.2–5.9)
RBC # BLD: 3 M/UL (ref 4.2–5.9)
RBC # BLD: 3.02 M/UL (ref 4.2–5.9)
RBC # BLD: 3.09 M/UL (ref 4.2–5.9)
RBC # BLD: 3.17 M/UL (ref 4.2–5.9)
RBC # BLD: 3.24 M/UL (ref 4.2–5.9)
RBC # BLD: 3.3 M/UL (ref 4.2–5.9)
RBC # BLD: 3.32 M/UL (ref 4.2–5.9)
RBC # BLD: 3.37 M/UL (ref 4.2–5.9)
RBC UA: >100 /HPF (ref 0–4)
RBC UA: ABNORMAL /HPF (ref 0–4)
RBC UA: ABNORMAL /HPF (ref 0–4)
SARS-COV-2, NAAT: NOT DETECTED
SARS-COV-2: NOT DETECTED
SODIUM BLD-SCNC: 136 MMOL/L (ref 136–145)
SODIUM BLD-SCNC: 137 MMOL/L (ref 136–145)
SODIUM BLD-SCNC: 138 MMOL/L (ref 136–145)
SODIUM BLD-SCNC: 139 MMOL/L (ref 136–145)
SODIUM BLD-SCNC: 139 MMOL/L (ref 136–145)
SODIUM BLD-SCNC: 140 MMOL/L (ref 136–145)
SODIUM BLD-SCNC: 141 MMOL/L (ref 136–145)
SODIUM BLD-SCNC: 142 MMOL/L (ref 136–145)
SODIUM BLD-SCNC: 143 MMOL/L (ref 136–145)
SODIUM BLD-SCNC: 143 MMOL/L (ref 136–145)
SODIUM BLD-SCNC: 145 MMOL/L (ref 136–145)
SODIUM BLD-SCNC: 148 MMOL/L (ref 136–145)
SODIUM BLD-SCNC: 148 MMOL/L (ref 136–145)
SODIUM BLD-SCNC: 150 MMOL/L (ref 136–145)
SODIUM BLD-SCNC: 152 MMOL/L (ref 136–145)
SODIUM URINE: 61 MMOL/L
SODIUM URINE: 70 MMOL/L
SPE/IFE INTERPRETATION: NORMAL
SPECIFIC GRAVITY UA: 1.02 (ref 1–1.03)
SPECIMEN STATUS: NORMAL
STREP PNEUMONIAE ANTIGEN, URINE: NORMAL
STREP PNEUMONIAE ANTIGEN, URINE: NORMAL
T4 FREE: 1 NG/DL (ref 0.9–1.8)
TCO2 CALC VENOUS: 25 MMOL/L
TOTAL IRON BINDING CAPACITY: 152 UG/DL (ref 260–445)
TOTAL IRON BINDING CAPACITY: 169 UG/DL (ref 260–445)
TOTAL PROTEIN: 6.6 G/DL (ref 6.4–8.2)
TOTAL PROTEIN: 7.1 G/DL (ref 6.4–8.2)
TOTAL PROTEIN: 8.2 G/DL (ref 6.4–8.2)
TOTAL PROTEIN: 8.2 G/DL (ref 6.4–8.2)
TRANSFERRIN: 123 MG/DL (ref 200–360)
TROPONIN: 0.12 NG/ML
TROPONIN: 0.14 NG/ML
TROPONIN: 0.28 NG/ML
TROPONIN: 0.49 NG/ML
TROPONIN: 0.58 NG/ML
TROPONIN: 0.61 NG/ML
TSH REFLEX: 55.23 UIU/ML (ref 0.27–4.2)
URIC ACID, SERUM: 7.1 MG/DL (ref 3.5–7.2)
URIC ACID, SERUM: 8.5 MG/DL (ref 3.5–7.2)
URINE CULTURE, ROUTINE: NORMAL
URINE REFLEX TO CULTURE: YES
URINE TYPE: ABNORMAL
UROBILINOGEN, URINE: 0.2 E.U./DL
UROBILINOGEN, URINE: 0.2 E.U./DL
UROBILINOGEN, URINE: 1 E.U./DL
VITAMIN D 25-HYDROXY: 52.6 NG/ML
VITAMIN D 25-HYDROXY: 56.8 NG/ML
WBC # BLD: 10 K/UL (ref 4–11)
WBC # BLD: 13.3 K/UL (ref 4–11)
WBC # BLD: 6.1 K/UL (ref 4–11)
WBC # BLD: 6.4 K/UL (ref 4–11)
WBC # BLD: 7.2 K/UL (ref 4–11)
WBC # BLD: 7.6 K/UL (ref 4–11)
WBC # BLD: 7.7 K/UL (ref 4–11)
WBC # BLD: 8.3 K/UL (ref 4–11)
WBC # BLD: 8.4 K/UL (ref 4–11)
WBC # BLD: 8.4 K/UL (ref 4–11)
WBC # BLD: 8.7 K/UL (ref 4–11)
WBC # BLD: 8.8 K/UL (ref 4–11)
WBC # BLD: 9 K/UL (ref 4–11)
WBC UA: >100 /HPF (ref 0–5)
WBC UA: >100 /HPF (ref 0–5)
WBC UA: ABNORMAL /HPF (ref 0–5)
YEAST: PRESENT /HPF

## 2021-01-01 PROCEDURE — 2580000003 HC RX 258: Performed by: INTERNAL MEDICINE

## 2021-01-01 PROCEDURE — 2580000003 HC RX 258: Performed by: HOSPITALIST

## 2021-01-01 PROCEDURE — 36415 COLL VENOUS BLD VENIPUNCTURE: CPT

## 2021-01-01 PROCEDURE — 84484 ASSAY OF TROPONIN QUANT: CPT

## 2021-01-01 PROCEDURE — 86850 RBC ANTIBODY SCREEN: CPT

## 2021-01-01 PROCEDURE — 85018 HEMOGLOBIN: CPT

## 2021-01-01 PROCEDURE — P9016 RBC LEUKOCYTES REDUCED: HCPCS

## 2021-01-01 PROCEDURE — 87040 BLOOD CULTURE FOR BACTERIA: CPT

## 2021-01-01 PROCEDURE — 97535 SELF CARE MNGMENT TRAINING: CPT

## 2021-01-01 PROCEDURE — 6370000000 HC RX 637 (ALT 250 FOR IP): Performed by: EMERGENCY MEDICINE

## 2021-01-01 PROCEDURE — 92526 ORAL FUNCTION THERAPY: CPT

## 2021-01-01 PROCEDURE — 80053 COMPREHEN METABOLIC PANEL: CPT

## 2021-01-01 PROCEDURE — 99233 SBSQ HOSP IP/OBS HIGH 50: CPT | Performed by: HOSPITALIST

## 2021-01-01 PROCEDURE — 99223 1ST HOSP IP/OBS HIGH 75: CPT | Performed by: HOSPITALIST

## 2021-01-01 PROCEDURE — 6360000002 HC RX W HCPCS: Performed by: NURSE PRACTITIONER

## 2021-01-01 PROCEDURE — 94761 N-INVAS EAR/PLS OXIMETRY MLT: CPT

## 2021-01-01 PROCEDURE — 6370000000 HC RX 637 (ALT 250 FOR IP): Performed by: INTERNAL MEDICINE

## 2021-01-01 PROCEDURE — 83540 ASSAY OF IRON: CPT

## 2021-01-01 PROCEDURE — 82306 VITAMIN D 25 HYDROXY: CPT

## 2021-01-01 PROCEDURE — 1200000000 HC SEMI PRIVATE

## 2021-01-01 PROCEDURE — 93010 ELECTROCARDIOGRAM REPORT: CPT | Performed by: INTERNAL MEDICINE

## 2021-01-01 PROCEDURE — 72100 X-RAY EXAM L-S SPINE 2/3 VWS: CPT

## 2021-01-01 PROCEDURE — 6370000000 HC RX 637 (ALT 250 FOR IP): Performed by: FAMILY MEDICINE

## 2021-01-01 PROCEDURE — 2700000000 HC OXYGEN THERAPY PER DAY

## 2021-01-01 PROCEDURE — 93306 TTE W/DOPPLER COMPLETE: CPT

## 2021-01-01 PROCEDURE — 3700000001 HC ADD 15 MINUTES (ANESTHESIA): Performed by: INTERNAL MEDICINE

## 2021-01-01 PROCEDURE — 85025 COMPLETE CBC W/AUTO DIFF WBC: CPT

## 2021-01-01 PROCEDURE — 84145 PROCALCITONIN (PCT): CPT

## 2021-01-01 PROCEDURE — 87641 MR-STAPH DNA AMP PROBE: CPT

## 2021-01-01 PROCEDURE — 6360000002 HC RX W HCPCS

## 2021-01-01 PROCEDURE — G8420 CALC BMI NORM PARAMETERS: HCPCS | Performed by: INTERNAL MEDICINE

## 2021-01-01 PROCEDURE — 84300 ASSAY OF URINE SODIUM: CPT

## 2021-01-01 PROCEDURE — 6360000002 HC RX W HCPCS: Performed by: EMERGENCY MEDICINE

## 2021-01-01 PROCEDURE — 85610 PROTHROMBIN TIME: CPT

## 2021-01-01 PROCEDURE — 96366 THER/PROPH/DIAG IV INF ADDON: CPT

## 2021-01-01 PROCEDURE — 2580000003 HC RX 258: Performed by: NURSE PRACTITIONER

## 2021-01-01 PROCEDURE — 71045 X-RAY EXAM CHEST 1 VIEW: CPT

## 2021-01-01 PROCEDURE — 1123F ACP DISCUSS/DSCN MKR DOCD: CPT | Performed by: INTERNAL MEDICINE

## 2021-01-01 PROCEDURE — 6360000002 HC RX W HCPCS: Performed by: INTERNAL MEDICINE

## 2021-01-01 PROCEDURE — 2500000003 HC RX 250 WO HCPCS: Performed by: INTERNAL MEDICINE

## 2021-01-01 PROCEDURE — 80048 BASIC METABOLIC PNL TOTAL CA: CPT

## 2021-01-01 PROCEDURE — 87449 NOS EACH ORGANISM AG IA: CPT

## 2021-01-01 PROCEDURE — 97162 PT EVAL MOD COMPLEX 30 MIN: CPT

## 2021-01-01 PROCEDURE — 6370000000 HC RX 637 (ALT 250 FOR IP): Performed by: PHYSICIAN ASSISTANT

## 2021-01-01 PROCEDURE — 99233 SBSQ HOSP IP/OBS HIGH 50: CPT | Performed by: NURSE PRACTITIONER

## 2021-01-01 PROCEDURE — 4040F PNEUMOC VAC/ADMIN/RCVD: CPT | Performed by: INTERNAL MEDICINE

## 2021-01-01 PROCEDURE — 85027 COMPLETE CBC AUTOMATED: CPT

## 2021-01-01 PROCEDURE — 85014 HEMATOCRIT: CPT

## 2021-01-01 PROCEDURE — 80069 RENAL FUNCTION PANEL: CPT

## 2021-01-01 PROCEDURE — 94640 AIRWAY INHALATION TREATMENT: CPT

## 2021-01-01 PROCEDURE — 99233 SBSQ HOSP IP/OBS HIGH 50: CPT | Performed by: INTERNAL MEDICINE

## 2021-01-01 PROCEDURE — 51702 INSERT TEMP BLADDER CATH: CPT

## 2021-01-01 PROCEDURE — 2500000003 HC RX 250 WO HCPCS

## 2021-01-01 PROCEDURE — 84550 ASSAY OF BLOOD/URIC ACID: CPT

## 2021-01-01 PROCEDURE — 71250 CT THORAX DX C-: CPT

## 2021-01-01 PROCEDURE — 96361 HYDRATE IV INFUSION ADD-ON: CPT

## 2021-01-01 PROCEDURE — 83970 ASSAY OF PARATHORMONE: CPT

## 2021-01-01 PROCEDURE — 6360000002 HC RX W HCPCS: Performed by: HOSPITALIST

## 2021-01-01 PROCEDURE — 83883 ASSAY NEPHELOMETRY NOT SPEC: CPT

## 2021-01-01 PROCEDURE — 36430 TRANSFUSION BLD/BLD COMPNT: CPT

## 2021-01-01 PROCEDURE — G0328 FECAL BLOOD SCRN IMMUNOASSAY: HCPCS

## 2021-01-01 PROCEDURE — 99222 1ST HOSP IP/OBS MODERATE 55: CPT | Performed by: INTERNAL MEDICINE

## 2021-01-01 PROCEDURE — 86923 COMPATIBILITY TEST ELECTRIC: CPT

## 2021-01-01 PROCEDURE — 6360000002 HC RX W HCPCS: Performed by: FAMILY MEDICINE

## 2021-01-01 PROCEDURE — 6370000000 HC RX 637 (ALT 250 FOR IP): Performed by: NURSE PRACTITIONER

## 2021-01-01 PROCEDURE — 0T9B70Z DRAINAGE OF BLADDER WITH DRAINAGE DEVICE, VIA NATURAL OR ARTIFICIAL OPENING: ICD-10-PCS | Performed by: INTERNAL MEDICINE

## 2021-01-01 PROCEDURE — 84439 ASSAY OF FREE THYROXINE: CPT

## 2021-01-01 PROCEDURE — 87635 SARS-COV-2 COVID-19 AMP PRB: CPT

## 2021-01-01 PROCEDURE — 82784 ASSAY IGA/IGD/IGG/IGM EACH: CPT

## 2021-01-01 PROCEDURE — 2500000003 HC RX 250 WO HCPCS: Performed by: FAMILY MEDICINE

## 2021-01-01 PROCEDURE — 82330 ASSAY OF CALCIUM: CPT

## 2021-01-01 PROCEDURE — 0DJ08ZZ INSPECTION OF UPPER INTESTINAL TRACT, VIA NATURAL OR ARTIFICIAL OPENING ENDOSCOPIC: ICD-10-PCS | Performed by: INTERNAL MEDICINE

## 2021-01-01 PROCEDURE — G8484 FLU IMMUNIZE NO ADMIN: HCPCS | Performed by: INTERNAL MEDICINE

## 2021-01-01 PROCEDURE — 3609017100 HC EGD: Performed by: INTERNAL MEDICINE

## 2021-01-01 PROCEDURE — 2580000003 HC RX 258: Performed by: FAMILY MEDICINE

## 2021-01-01 PROCEDURE — 82803 BLOOD GASES ANY COMBINATION: CPT

## 2021-01-01 PROCEDURE — 97530 THERAPEUTIC ACTIVITIES: CPT

## 2021-01-01 PROCEDURE — 97166 OT EVAL MOD COMPLEX 45 MIN: CPT

## 2021-01-01 PROCEDURE — 86900 BLOOD TYPING SEROLOGIC ABO: CPT

## 2021-01-01 PROCEDURE — 77080 DXA BONE DENSITY AXIAL: CPT

## 2021-01-01 PROCEDURE — 85384 FIBRINOGEN ACTIVITY: CPT

## 2021-01-01 PROCEDURE — 82570 ASSAY OF URINE CREATININE: CPT

## 2021-01-01 PROCEDURE — 99232 SBSQ HOSP IP/OBS MODERATE 35: CPT | Performed by: HOSPITALIST

## 2021-01-01 PROCEDURE — 6360000002 HC RX W HCPCS: Performed by: PHYSICIAN ASSISTANT

## 2021-01-01 PROCEDURE — 92610 EVALUATE SWALLOWING FUNCTION: CPT

## 2021-01-01 PROCEDURE — 2500000003 HC RX 250 WO HCPCS: Performed by: HOSPITALIST

## 2021-01-01 PROCEDURE — 84165 PROTEIN E-PHORESIS SERUM: CPT

## 2021-01-01 PROCEDURE — 81001 URINALYSIS AUTO W/SCOPE: CPT

## 2021-01-01 PROCEDURE — 97110 THERAPEUTIC EXERCISES: CPT

## 2021-01-01 PROCEDURE — 74230 X-RAY XM SWLNG FUNCJ C+: CPT

## 2021-01-01 PROCEDURE — 86140 C-REACTIVE PROTEIN: CPT

## 2021-01-01 PROCEDURE — 86901 BLOOD TYPING SEROLOGIC RH(D): CPT

## 2021-01-01 PROCEDURE — 1036F TOBACCO NON-USER: CPT | Performed by: INTERNAL MEDICINE

## 2021-01-01 PROCEDURE — 83615 LACTATE (LD) (LDH) ENZYME: CPT

## 2021-01-01 PROCEDURE — 99222 1ST HOSP IP/OBS MODERATE 55: CPT | Performed by: HOSPITALIST

## 2021-01-01 PROCEDURE — 82542 COL CHROMOTOGRAPHY QUAL/QUAN: CPT

## 2021-01-01 PROCEDURE — 87086 URINE CULTURE/COLONY COUNT: CPT

## 2021-01-01 PROCEDURE — 85730 THROMBOPLASTIN TIME PARTIAL: CPT

## 2021-01-01 PROCEDURE — 93005 ELECTROCARDIOGRAM TRACING: CPT | Performed by: PHYSICIAN ASSISTANT

## 2021-01-01 PROCEDURE — 93005 ELECTROCARDIOGRAM TRACING: CPT | Performed by: EMERGENCY MEDICINE

## 2021-01-01 PROCEDURE — 97116 GAIT TRAINING THERAPY: CPT

## 2021-01-01 PROCEDURE — 84155 ASSAY OF PROTEIN SERUM: CPT

## 2021-01-01 PROCEDURE — 83880 ASSAY OF NATRIURETIC PEPTIDE: CPT

## 2021-01-01 PROCEDURE — 76770 US EXAM ABDO BACK WALL COMP: CPT

## 2021-01-01 PROCEDURE — 70450 CT HEAD/BRAIN W/O DYE: CPT

## 2021-01-01 PROCEDURE — 99285 EMERGENCY DEPT VISIT HI MDM: CPT

## 2021-01-01 PROCEDURE — 96365 THER/PROPH/DIAG IV INF INIT: CPT

## 2021-01-01 PROCEDURE — 2709999900 HC NON-CHARGEABLE SUPPLY: Performed by: INTERNAL MEDICINE

## 2021-01-01 PROCEDURE — 1111F DSCHRG MED/CURRENT MED MERGE: CPT | Performed by: INTERNAL MEDICINE

## 2021-01-01 PROCEDURE — G8427 DOCREV CUR MEDS BY ELIG CLIN: HCPCS | Performed by: INTERNAL MEDICINE

## 2021-01-01 PROCEDURE — 83550 IRON BINDING TEST: CPT

## 2021-01-01 PROCEDURE — 51798 US URINE CAPACITY MEASURE: CPT

## 2021-01-01 PROCEDURE — 71046 X-RAY EXAM CHEST 2 VIEWS: CPT

## 2021-01-01 PROCEDURE — 2580000003 HC RX 258: Performed by: PHYSICIAN ASSISTANT

## 2021-01-01 PROCEDURE — 2580000003 HC RX 258: Performed by: EMERGENCY MEDICINE

## 2021-01-01 PROCEDURE — 83605 ASSAY OF LACTIC ACID: CPT

## 2021-01-01 PROCEDURE — C9113 INJ PANTOPRAZOLE SODIUM, VIA: HCPCS | Performed by: PHYSICIAN ASSISTANT

## 2021-01-01 PROCEDURE — 99214 OFFICE O/P EST MOD 30 MIN: CPT | Performed by: INTERNAL MEDICINE

## 2021-01-01 PROCEDURE — C9113 INJ PANTOPRAZOLE SODIUM, VIA: HCPCS | Performed by: FAMILY MEDICINE

## 2021-01-01 PROCEDURE — 84466 ASSAY OF TRANSFERRIN: CPT

## 2021-01-01 PROCEDURE — 99204 OFFICE O/P NEW MOD 45 MIN: CPT | Performed by: INTERNAL MEDICINE

## 2021-01-01 PROCEDURE — 85379 FIBRIN DEGRADATION QUANT: CPT

## 2021-01-01 PROCEDURE — 2000000000 HC ICU R&B

## 2021-01-01 PROCEDURE — 93280 PM DEVICE PROGR EVAL DUAL: CPT | Performed by: INTERNAL MEDICINE

## 2021-01-01 PROCEDURE — 76536 US EXAM OF HEAD AND NECK: CPT

## 2021-01-01 PROCEDURE — 2580000003 HC RX 258

## 2021-01-01 PROCEDURE — 3700000000 HC ANESTHESIA ATTENDED CARE: Performed by: INTERNAL MEDICINE

## 2021-01-01 PROCEDURE — 96375 TX/PRO/DX INJ NEW DRUG ADDON: CPT

## 2021-01-01 PROCEDURE — 43235 EGD DIAGNOSTIC BRUSH WASH: CPT | Performed by: INTERNAL MEDICINE

## 2021-01-01 PROCEDURE — 82728 ASSAY OF FERRITIN: CPT

## 2021-01-01 PROCEDURE — U0005 INFEC AGEN DETEC AMPLI PROBE: HCPCS

## 2021-01-01 PROCEDURE — U0003 INFECTIOUS AGENT DETECTION BY NUCLEIC ACID (DNA OR RNA); SEVERE ACUTE RESPIRATORY SYNDROME CORONAVIRUS 2 (SARS-COV-2) (CORONAVIRUS DISEASE [COVID-19]), AMPLIFIED PROBE TECHNIQUE, MAKING USE OF HIGH THROUGHPUT TECHNOLOGIES AS DESCRIBED BY CMS-2020-01-R: HCPCS

## 2021-01-01 PROCEDURE — 80307 DRUG TEST PRSMV CHEM ANLYZR: CPT

## 2021-01-01 PROCEDURE — 84443 ASSAY THYROID STIM HORMONE: CPT

## 2021-01-01 RX ORDER — ASPIRIN 81 MG/1
162 TABLET ORAL ONCE
Status: DISCONTINUED | OUTPATIENT
Start: 2021-01-01 | End: 2021-01-01

## 2021-01-01 RX ORDER — FLUTICASONE PROPIONATE 50 MCG
1 SPRAY, SUSPENSION (ML) NASAL DAILY
Status: ON HOLD | COMMUNITY
End: 2021-01-01 | Stop reason: HOSPADM

## 2021-01-01 RX ORDER — FUROSEMIDE 10 MG/ML
40 INJECTION INTRAMUSCULAR; INTRAVENOUS 2 TIMES DAILY
Status: DISCONTINUED | OUTPATIENT
Start: 2021-01-01 | End: 2021-01-01

## 2021-01-01 RX ORDER — POLYETHYLENE GLYCOL 3350 17 G/17G
17 POWDER, FOR SOLUTION ORAL DAILY PRN
Status: DISCONTINUED | OUTPATIENT
Start: 2021-01-01 | End: 2021-01-01 | Stop reason: HOSPADM

## 2021-01-01 RX ORDER — ASCORBIC ACID 500 MG
500 TABLET ORAL DAILY
Status: DISCONTINUED | OUTPATIENT
Start: 2021-01-01 | End: 2021-01-01 | Stop reason: HOSPADM

## 2021-01-01 RX ORDER — FUROSEMIDE 10 MG/ML
40 INJECTION INTRAMUSCULAR; INTRAVENOUS ONCE
Status: COMPLETED | OUTPATIENT
Start: 2021-01-01 | End: 2021-01-01

## 2021-01-01 RX ORDER — ACETAMINOPHEN 650 MG/1
650 SUPPOSITORY RECTAL EVERY 6 HOURS PRN
Status: DISCONTINUED | OUTPATIENT
Start: 2021-01-01 | End: 2021-01-01 | Stop reason: HOSPADM

## 2021-01-01 RX ORDER — ONDANSETRON 2 MG/ML
4 INJECTION INTRAMUSCULAR; INTRAVENOUS EVERY 6 HOURS PRN
Status: DISCONTINUED | OUTPATIENT
Start: 2021-01-01 | End: 2021-01-01 | Stop reason: HOSPADM

## 2021-01-01 RX ORDER — LIDOCAINE HYDROCHLORIDE 20 MG/ML
INJECTION, SOLUTION INFILTRATION; PERINEURAL PRN
Status: DISCONTINUED | OUTPATIENT
Start: 2021-01-01 | End: 2021-01-01 | Stop reason: SDUPTHER

## 2021-01-01 RX ORDER — SODIUM CHLORIDE 0.9 % (FLUSH) 0.9 %
5-40 SYRINGE (ML) INJECTION PRN
Status: DISCONTINUED | OUTPATIENT
Start: 2021-01-01 | End: 2021-01-01 | Stop reason: HOSPADM

## 2021-01-01 RX ORDER — SODIUM CHLORIDE 9 MG/ML
INJECTION, SOLUTION INTRAVENOUS CONTINUOUS PRN
Status: DISCONTINUED | OUTPATIENT
Start: 2021-01-01 | End: 2021-01-01 | Stop reason: SDUPTHER

## 2021-01-01 RX ORDER — 0.9 % SODIUM CHLORIDE 0.9 %
500 INTRAVENOUS SOLUTION INTRAVENOUS ONCE
Status: COMPLETED | OUTPATIENT
Start: 2021-01-01 | End: 2021-01-01

## 2021-01-01 RX ORDER — METOPROLOL SUCCINATE 25 MG/1
12.5 TABLET, EXTENDED RELEASE ORAL DAILY
Status: DISCONTINUED | OUTPATIENT
Start: 2021-01-01 | End: 2021-01-01 | Stop reason: HOSPADM

## 2021-01-01 RX ORDER — ASPIRIN 300 MG/1
300 SUPPOSITORY RECTAL ONCE
Status: COMPLETED | OUTPATIENT
Start: 2021-01-01 | End: 2021-01-01

## 2021-01-01 RX ORDER — ACETAMINOPHEN 650 MG/1
650 SUPPOSITORY RECTAL ONCE
Status: COMPLETED | OUTPATIENT
Start: 2021-01-01 | End: 2021-01-01

## 2021-01-01 RX ORDER — DOXYCYCLINE HYCLATE 100 MG
100 TABLET ORAL EVERY 12 HOURS SCHEDULED
Status: DISCONTINUED | OUTPATIENT
Start: 2021-01-01 | End: 2021-01-01 | Stop reason: HOSPADM

## 2021-01-01 RX ORDER — LEVOTHYROXINE SODIUM 88 UG/1
88 TABLET ORAL DAILY
Status: DISCONTINUED | OUTPATIENT
Start: 2021-01-01 | End: 2021-01-01 | Stop reason: HOSPADM

## 2021-01-01 RX ORDER — SACCHAROMYCES BOULARDII 250 MG
250 CAPSULE ORAL 2 TIMES DAILY
Status: DISCONTINUED | OUTPATIENT
Start: 2021-01-01 | End: 2021-01-01 | Stop reason: HOSPADM

## 2021-01-01 RX ORDER — METOPROLOL SUCCINATE 25 MG/1
12.5 TABLET, EXTENDED RELEASE ORAL DAILY
Qty: 30 TABLET | Refills: 3 | Status: ON HOLD | OUTPATIENT
Start: 2021-01-01 | End: 2021-01-01 | Stop reason: HOSPADM

## 2021-01-01 RX ORDER — 0.9 % SODIUM CHLORIDE 0.9 %
1000 INTRAVENOUS SOLUTION INTRAVENOUS ONCE
Status: COMPLETED | OUTPATIENT
Start: 2021-01-01 | End: 2021-01-01

## 2021-01-01 RX ORDER — SODIUM CHLORIDE 0.9 % (FLUSH) 0.9 %
10 SYRINGE (ML) INJECTION PRN
Status: DISCONTINUED | OUTPATIENT
Start: 2021-01-01 | End: 2021-01-01 | Stop reason: HOSPADM

## 2021-01-01 RX ORDER — OMEPRAZOLE 20 MG/1
40 CAPSULE, DELAYED RELEASE ORAL DAILY
Status: ON HOLD | COMMUNITY
End: 2021-01-01 | Stop reason: HOSPADM

## 2021-01-01 RX ORDER — SODIUM CHLORIDE 9 MG/ML
INJECTION, SOLUTION INTRAVENOUS CONTINUOUS
Status: ACTIVE | OUTPATIENT
Start: 2021-01-01 | End: 2021-01-01

## 2021-01-01 RX ORDER — FOLIC ACID 1 MG/1
1 TABLET ORAL DAILY
Status: DISCONTINUED | OUTPATIENT
Start: 2021-01-01 | End: 2021-01-01 | Stop reason: HOSPADM

## 2021-01-01 RX ORDER — ISOSORBIDE MONONITRATE 30 MG/1
30 TABLET, EXTENDED RELEASE ORAL DAILY
Status: ON HOLD | COMMUNITY
End: 2021-01-01 | Stop reason: HOSPADM

## 2021-01-01 RX ORDER — ACETAMINOPHEN 325 MG/1
650 TABLET ORAL EVERY 6 HOURS PRN
Status: DISCONTINUED | OUTPATIENT
Start: 2021-01-01 | End: 2021-01-01 | Stop reason: HOSPADM

## 2021-01-01 RX ORDER — ATORVASTATIN CALCIUM 40 MG/1
40 TABLET, FILM COATED ORAL NIGHTLY
Status: DISCONTINUED | OUTPATIENT
Start: 2021-01-01 | End: 2021-01-01 | Stop reason: HOSPADM

## 2021-01-01 RX ORDER — QUETIAPINE FUMARATE 50 MG/1
50 TABLET, EXTENDED RELEASE ORAL ONCE
Status: COMPLETED | OUTPATIENT
Start: 2021-01-01 | End: 2021-01-01

## 2021-01-01 RX ORDER — LANSOPRAZOLE
15 KIT
Status: DISCONTINUED | OUTPATIENT
Start: 2021-01-01 | End: 2021-01-01 | Stop reason: HOSPADM

## 2021-01-01 RX ORDER — ISOSORBIDE MONONITRATE 30 MG/1
30 TABLET, EXTENDED RELEASE ORAL DAILY
Status: DISCONTINUED | OUTPATIENT
Start: 2021-01-01 | End: 2021-01-01 | Stop reason: HOSPADM

## 2021-01-01 RX ORDER — IPRATROPIUM BROMIDE AND ALBUTEROL SULFATE 2.5; .5 MG/3ML; MG/3ML
1 SOLUTION RESPIRATORY (INHALATION) EVERY 4 HOURS
Status: ON HOLD | COMMUNITY
End: 2021-01-01 | Stop reason: HOSPADM

## 2021-01-01 RX ORDER — SODIUM CHLORIDE 9 MG/ML
INJECTION, SOLUTION INTRAVENOUS CONTINUOUS
Status: DISCONTINUED | OUTPATIENT
Start: 2021-01-01 | End: 2021-01-01

## 2021-01-01 RX ORDER — FLUTICASONE PROPIONATE 50 MCG
1 SPRAY, SUSPENSION (ML) NASAL DAILY
Status: DISCONTINUED | OUTPATIENT
Start: 2021-01-01 | End: 2021-01-01 | Stop reason: HOSPADM

## 2021-01-01 RX ORDER — ASPIRIN 81 MG/1
81 TABLET ORAL NIGHTLY
Status: ON HOLD | COMMUNITY
End: 2021-01-01 | Stop reason: HOSPADM

## 2021-01-01 RX ORDER — METOPROLOL SUCCINATE 25 MG/1
25 TABLET, EXTENDED RELEASE ORAL DAILY
Status: DISCONTINUED | OUTPATIENT
Start: 2021-01-01 | End: 2021-01-01

## 2021-01-01 RX ORDER — CALCIUM GLUCONATE 94 MG/ML
2000 INJECTION, SOLUTION INTRAVENOUS ONCE
Status: DISCONTINUED | OUTPATIENT
Start: 2021-01-01 | End: 2021-01-01 | Stop reason: SDUPTHER

## 2021-01-01 RX ORDER — HYDROMORPHONE HCL 110MG/55ML
0.5 PATIENT CONTROLLED ANALGESIA SYRINGE INTRAVENOUS EVERY 4 HOURS PRN
Status: DISCONTINUED | OUTPATIENT
Start: 2021-01-01 | End: 2021-01-01 | Stop reason: HOSPADM

## 2021-01-01 RX ORDER — DEXTROSE MONOHYDRATE 25 G/50ML
25 INJECTION, SOLUTION INTRAVENOUS ONCE
Status: COMPLETED | OUTPATIENT
Start: 2021-01-01 | End: 2021-01-01

## 2021-01-01 RX ORDER — METOPROLOL SUCCINATE 25 MG/1
25 TABLET, EXTENDED RELEASE ORAL DAILY
Status: ON HOLD | COMMUNITY
End: 2021-01-01 | Stop reason: SDUPTHER

## 2021-01-01 RX ORDER — LEVALBUTEROL 1.25 MG/.5ML
1.25 SOLUTION, CONCENTRATE RESPIRATORY (INHALATION) 2 TIMES DAILY
Status: DISCONTINUED | OUTPATIENT
Start: 2021-01-01 | End: 2021-01-01

## 2021-01-01 RX ORDER — CALCITONIN SALMON 200 [USP'U]/ML
200 INJECTION, SOLUTION INTRAMUSCULAR; SUBCUTANEOUS 2 TIMES DAILY
Status: DISCONTINUED | OUTPATIENT
Start: 2021-01-01 | End: 2021-01-01

## 2021-01-01 RX ORDER — SODIUM CHLORIDE 9 MG/ML
25 INJECTION, SOLUTION INTRAVENOUS PRN
Status: DISCONTINUED | OUTPATIENT
Start: 2021-01-01 | End: 2021-01-01 | Stop reason: HOSPADM

## 2021-01-01 RX ORDER — LEVALBUTEROL INHALATION SOLUTION 1.25 MG/3ML
1.25 SOLUTION RESPIRATORY (INHALATION) EVERY 4 HOURS PRN
Qty: 360 ML | Refills: 11 | Status: SHIPPED | OUTPATIENT
Start: 2021-01-01 | End: 2021-01-01 | Stop reason: SDUPTHER

## 2021-01-01 RX ORDER — PROMETHAZINE HYDROCHLORIDE 25 MG/1
12.5 TABLET ORAL EVERY 6 HOURS PRN
Status: DISCONTINUED | OUTPATIENT
Start: 2021-01-01 | End: 2021-01-01 | Stop reason: HOSPADM

## 2021-01-01 RX ORDER — SODIUM CHLORIDE 9 MG/ML
INJECTION, SOLUTION INTRAVENOUS PRN
Status: DISCONTINUED | OUTPATIENT
Start: 2021-01-01 | End: 2021-01-01 | Stop reason: HOSPADM

## 2021-01-01 RX ORDER — HEPARIN SODIUM 5000 [USP'U]/ML
5000 INJECTION, SOLUTION INTRAVENOUS; SUBCUTANEOUS EVERY 8 HOURS SCHEDULED
Status: DISCONTINUED | OUTPATIENT
Start: 2021-01-01 | End: 2021-01-01 | Stop reason: HOSPADM

## 2021-01-01 RX ORDER — CALCITONIN SALMON 200 [USP'U]/ML
200 INJECTION, SOLUTION INTRAMUSCULAR; SUBCUTANEOUS 2 TIMES DAILY
Status: COMPLETED | OUTPATIENT
Start: 2021-01-01 | End: 2021-01-01

## 2021-01-01 RX ORDER — SODIUM CHLORIDE 0.9 % (FLUSH) 0.9 %
10 SYRINGE (ML) INJECTION EVERY 12 HOURS SCHEDULED
Status: DISCONTINUED | OUTPATIENT
Start: 2021-01-01 | End: 2021-01-01 | Stop reason: HOSPADM

## 2021-01-01 RX ORDER — FUROSEMIDE 10 MG/ML
20 INJECTION INTRAMUSCULAR; INTRAVENOUS DAILY
Status: DISCONTINUED | OUTPATIENT
Start: 2021-01-01 | End: 2021-01-01

## 2021-01-01 RX ORDER — LANOLIN ALCOHOL/MO/W.PET/CERES
50 CREAM (GRAM) TOPICAL NIGHTLY
Status: DISCONTINUED | OUTPATIENT
Start: 2021-01-01 | End: 2021-01-01 | Stop reason: HOSPADM

## 2021-01-01 RX ORDER — LEVALBUTEROL 1.25 MG/.5ML
1.25 SOLUTION, CONCENTRATE RESPIRATORY (INHALATION) EVERY 4 HOURS PRN
Status: DISCONTINUED | OUTPATIENT
Start: 2021-01-01 | End: 2021-01-01

## 2021-01-01 RX ORDER — ACETAMINOPHEN 325 MG/1
650 TABLET ORAL ONCE
Status: DISCONTINUED | OUTPATIENT
Start: 2021-01-01 | End: 2021-01-01

## 2021-01-01 RX ORDER — CHOLECALCIFEROL (VITAMIN D3) 125 MCG
100 CAPSULE ORAL DAILY
Status: ON HOLD | COMMUNITY
End: 2021-01-01 | Stop reason: HOSPADM

## 2021-01-01 RX ORDER — MORPHINE SULFATE 2 MG/ML
0.5 INJECTION, SOLUTION INTRAMUSCULAR; INTRAVENOUS ONCE
Status: COMPLETED | OUTPATIENT
Start: 2021-01-01 | End: 2021-01-01

## 2021-01-01 RX ORDER — LEVOTHYROXINE SODIUM 88 UG/1
88 TABLET ORAL DAILY
Status: ON HOLD | COMMUNITY
End: 2021-01-01 | Stop reason: HOSPADM

## 2021-01-01 RX ORDER — PANTOPRAZOLE SODIUM 40 MG/1
40 TABLET, DELAYED RELEASE ORAL
Status: DISCONTINUED | OUTPATIENT
Start: 2021-01-01 | End: 2021-01-01

## 2021-01-01 RX ORDER — MORPHINE SULFATE 2 MG/ML
0.5 INJECTION, SOLUTION INTRAMUSCULAR; INTRAVENOUS EVERY 12 HOURS PRN
Status: DISCONTINUED | OUTPATIENT
Start: 2021-01-01 | End: 2021-01-01

## 2021-01-01 RX ORDER — LORAZEPAM 0.5 MG/1
0.5 TABLET ORAL EVERY 6 HOURS PRN
Qty: 10 TABLET | Refills: 0 | Status: SHIPPED | OUTPATIENT
Start: 2021-01-01 | End: 2021-06-30

## 2021-01-01 RX ORDER — LORAZEPAM 2 MG/ML
0.25 INJECTION INTRAMUSCULAR EVERY 4 HOURS PRN
Status: DISCONTINUED | OUTPATIENT
Start: 2021-01-01 | End: 2021-01-01 | Stop reason: HOSPADM

## 2021-01-01 RX ORDER — HALOPERIDOL 5 MG/ML
2 INJECTION INTRAMUSCULAR EVERY 4 HOURS PRN
Status: DISCONTINUED | OUTPATIENT
Start: 2021-01-01 | End: 2021-01-01 | Stop reason: HOSPADM

## 2021-01-01 RX ORDER — 0.9 % SODIUM CHLORIDE 0.9 %
500 INTRAVENOUS SOLUTION INTRAVENOUS ONCE
Status: DISCONTINUED | OUTPATIENT
Start: 2021-01-01 | End: 2021-01-01

## 2021-01-01 RX ORDER — FUROSEMIDE 10 MG/ML
40 INJECTION INTRAMUSCULAR; INTRAVENOUS DAILY
Status: DISCONTINUED | OUTPATIENT
Start: 2021-01-01 | End: 2021-01-01

## 2021-01-01 RX ORDER — PANTOPRAZOLE SODIUM 40 MG/1
40 TABLET, DELAYED RELEASE ORAL
Status: DISCONTINUED | OUTPATIENT
Start: 2021-01-01 | End: 2021-01-01 | Stop reason: HOSPADM

## 2021-01-01 RX ORDER — FOLIC ACID 1 MG/1
1 TABLET ORAL DAILY
Status: ON HOLD | COMMUNITY
End: 2021-01-01 | Stop reason: HOSPADM

## 2021-01-01 RX ORDER — METOPROLOL SUCCINATE 25 MG/1
12.5 TABLET, EXTENDED RELEASE ORAL DAILY
Status: CANCELLED | OUTPATIENT
Start: 2021-01-01

## 2021-01-01 RX ORDER — CALCITONIN SALMON 200 [USP'U]/ML
100 INJECTION, SOLUTION INTRAMUSCULAR; SUBCUTANEOUS DAILY
Status: COMPLETED | OUTPATIENT
Start: 2021-01-01 | End: 2021-01-01

## 2021-01-01 RX ORDER — ISOSORBIDE MONONITRATE 30 MG/1
30 TABLET, EXTENDED RELEASE ORAL DAILY
Status: ON HOLD | COMMUNITY
Start: 2021-01-01 | End: 2021-01-01 | Stop reason: HOSPADM

## 2021-01-01 RX ORDER — CHOLECALCIFEROL (VITAMIN D3) 125 MCG
500 CAPSULE ORAL NIGHTLY
Status: DISCONTINUED | OUTPATIENT
Start: 2021-01-01 | End: 2021-01-01 | Stop reason: HOSPADM

## 2021-01-01 RX ORDER — CHOLECALCIFEROL (VITAMIN D3) 125 MCG
100 CAPSULE ORAL DAILY
Status: DISCONTINUED | OUTPATIENT
Start: 2021-01-01 | End: 2021-01-01 | Stop reason: RX

## 2021-01-01 RX ORDER — MULTIVIT WITH MINERALS/LUTEIN
500 TABLET ORAL DAILY
Status: ON HOLD | COMMUNITY
End: 2021-01-01 | Stop reason: HOSPADM

## 2021-01-01 RX ORDER — LEVOTHYROXINE SODIUM 0.05 MG/1
50 TABLET ORAL DAILY
Status: DISCONTINUED | OUTPATIENT
Start: 2021-01-01 | End: 2021-01-01

## 2021-01-01 RX ORDER — CIPROFLOXACIN 250 MG/1
TABLET, FILM COATED ORAL
Status: ON HOLD | COMMUNITY
Start: 2021-01-01 | End: 2021-01-01 | Stop reason: HOSPADM

## 2021-01-01 RX ORDER — LEVOTHYROXINE SODIUM ANHYDROUS 100 UG/5ML
37.5 INJECTION, POWDER, LYOPHILIZED, FOR SOLUTION INTRAVENOUS DAILY
Status: DISCONTINUED | OUTPATIENT
Start: 2021-01-01 | End: 2021-01-01

## 2021-01-01 RX ORDER — SODIUM CHLORIDE 9 MG/ML
1000 INJECTION, SOLUTION INTRAVENOUS CONTINUOUS
Status: ACTIVE | OUTPATIENT
Start: 2021-01-01 | End: 2021-01-01

## 2021-01-01 RX ORDER — ECHINACEA 400 MG
CAPSULE ORAL
Status: ON HOLD | COMMUNITY
End: 2021-01-01 | Stop reason: HOSPADM

## 2021-01-01 RX ORDER — PATIROMER 8.4 G/1
8.4 POWDER, FOR SUSPENSION ORAL DAILY
Status: ON HOLD | COMMUNITY
End: 2021-01-01 | Stop reason: HOSPADM

## 2021-01-01 RX ORDER — DEXTROSE MONOHYDRATE 50 MG/ML
INJECTION, SOLUTION INTRAVENOUS CONTINUOUS
Status: DISCONTINUED | OUTPATIENT
Start: 2021-01-01 | End: 2021-01-01 | Stop reason: HOSPADM

## 2021-01-01 RX ORDER — LEVALBUTEROL 1.25 MG/.5ML
1.25 SOLUTION, CONCENTRATE RESPIRATORY (INHALATION) 2 TIMES DAILY
Status: DISCONTINUED | OUTPATIENT
Start: 2021-01-01 | End: 2021-01-01 | Stop reason: HOSPADM

## 2021-01-01 RX ORDER — ASPIRIN 81 MG/1
81 TABLET ORAL NIGHTLY
Status: DISCONTINUED | OUTPATIENT
Start: 2021-01-01 | End: 2021-01-01

## 2021-01-01 RX ORDER — PANTOPRAZOLE SODIUM 40 MG/1
40 TABLET, DELAYED RELEASE ORAL
Qty: 30 TABLET | Refills: 3 | Status: ON HOLD
Start: 2021-01-01 | End: 2021-01-01 | Stop reason: HOSPADM

## 2021-01-01 RX ORDER — UBIDECARENONE 75 MG
100 CAPSULE ORAL NIGHTLY
Status: ON HOLD | COMMUNITY
End: 2021-01-01 | Stop reason: HOSPADM

## 2021-01-01 RX ORDER — LEVALBUTEROL INHALATION SOLUTION 1.25 MG/3ML
1.25 SOLUTION RESPIRATORY (INHALATION) EVERY 4 HOURS PRN
Qty: 360 ML | Refills: 5 | Status: ON HOLD | OUTPATIENT
Start: 2021-01-01 | End: 2021-01-01 | Stop reason: HOSPADM

## 2021-01-01 RX ORDER — PROPOFOL 10 MG/ML
INJECTION, EMULSION INTRAVENOUS PRN
Status: DISCONTINUED | OUTPATIENT
Start: 2021-01-01 | End: 2021-01-01 | Stop reason: SDUPTHER

## 2021-01-01 RX ORDER — SODIUM CHLORIDE 0.9 % (FLUSH) 0.9 %
5-40 SYRINGE (ML) INJECTION EVERY 12 HOURS SCHEDULED
Status: DISCONTINUED | OUTPATIENT
Start: 2021-01-01 | End: 2021-01-01 | Stop reason: HOSPADM

## 2021-01-01 RX ORDER — MORPHINE SULFATE 2 MG/ML
2 INJECTION, SOLUTION INTRAMUSCULAR; INTRAVENOUS
Status: DISCONTINUED | OUTPATIENT
Start: 2021-01-01 | End: 2021-01-01 | Stop reason: HOSPADM

## 2021-01-01 RX ORDER — ATORVASTATIN CALCIUM 40 MG/1
40 TABLET, FILM COATED ORAL NIGHTLY
Qty: 30 TABLET | Refills: 3 | Status: ON HOLD
Start: 2021-01-01 | End: 2021-01-01 | Stop reason: HOSPADM

## 2021-01-01 RX ORDER — SODIUM CHLORIDE 9 MG/ML
1000 INJECTION, SOLUTION INTRAVENOUS CONTINUOUS
Status: DISCONTINUED | OUTPATIENT
Start: 2021-01-01 | End: 2021-01-01

## 2021-01-01 RX ORDER — LANOLIN ALCOHOL/MO/W.PET/CERES
50 CREAM (GRAM) TOPICAL NIGHTLY
Status: ON HOLD | COMMUNITY
End: 2021-01-01 | Stop reason: HOSPADM

## 2021-01-01 RX ORDER — LEVOTHYROXINE SODIUM 0.1 MG/1
100 TABLET ORAL DAILY
Status: DISCONTINUED | OUTPATIENT
Start: 2021-01-01 | End: 2021-01-01 | Stop reason: HOSPADM

## 2021-01-01 RX ORDER — CALCITONIN SALMON 200 [USP'U]/ML
200 INJECTION, SOLUTION INTRAMUSCULAR; SUBCUTANEOUS 2 TIMES DAILY
Status: DISPENSED | OUTPATIENT
Start: 2021-01-01 | End: 2021-01-01

## 2021-01-01 RX ORDER — ASPIRIN 81 MG/1
81 TABLET, CHEWABLE ORAL DAILY
Status: DISCONTINUED | OUTPATIENT
Start: 2021-01-01 | End: 2021-01-01 | Stop reason: HOSPADM

## 2021-01-01 RX ADMIN — LEVOTHYROXINE SODIUM 100 MCG: 0.1 TABLET ORAL at 05:53

## 2021-01-01 RX ADMIN — FLUTICASONE PROPIONATE 1 SPRAY: 50 SPRAY, METERED NASAL at 09:11

## 2021-01-01 RX ADMIN — FLUTICASONE PROPIONATE 1 SPRAY: 50 SPRAY, METERED NASAL at 19:14

## 2021-01-01 RX ADMIN — PANTOPRAZOLE SODIUM 40 MG: 40 TABLET, DELAYED RELEASE ORAL at 05:53

## 2021-01-01 RX ADMIN — IRON SUCROSE 100 MG: 20 INJECTION, SOLUTION INTRAVENOUS at 16:20

## 2021-01-01 RX ADMIN — Medication 10 ML: at 10:45

## 2021-01-01 RX ADMIN — SODIUM CHLORIDE: 9 INJECTION, SOLUTION INTRAVENOUS at 11:12

## 2021-01-01 RX ADMIN — FUROSEMIDE 40 MG: 10 INJECTION, SOLUTION INTRAMUSCULAR; INTRAVENOUS at 09:34

## 2021-01-01 RX ADMIN — ENOXAPARIN SODIUM 40 MG: 40 INJECTION SUBCUTANEOUS at 09:34

## 2021-01-01 RX ADMIN — DOXYCYCLINE 100 MG: 100 INJECTION, POWDER, LYOPHILIZED, FOR SOLUTION INTRAVENOUS at 09:24

## 2021-01-01 RX ADMIN — LIDOCAINE HYDROCHLORIDE 50 MG: 20 INJECTION, SOLUTION INFILTRATION; PERINEURAL at 07:36

## 2021-01-01 RX ADMIN — OXYCODONE HYDROCHLORIDE AND ACETAMINOPHEN 500 MG: 500 TABLET ORAL at 07:54

## 2021-01-01 RX ADMIN — ASPIRIN 81 MG: 81 TABLET, CHEWABLE ORAL at 09:11

## 2021-01-01 RX ADMIN — DOXYCYCLINE HYCLATE 100 MG: 100 TABLET, COATED ORAL at 21:16

## 2021-01-01 RX ADMIN — ACETAMINOPHEN 650 MG: 325 TABLET ORAL at 04:52

## 2021-01-01 RX ADMIN — ASPIRIN 81 MG: 81 TABLET, CHEWABLE ORAL at 09:59

## 2021-01-01 RX ADMIN — PROPOFOL 50 MG: 10 INJECTION, EMULSION INTRAVENOUS at 07:39

## 2021-01-01 RX ADMIN — ENOXAPARIN SODIUM 40 MG: 40 INJECTION SUBCUTANEOUS at 09:12

## 2021-01-01 RX ADMIN — Medication 10 ML: at 08:22

## 2021-01-01 RX ADMIN — ENOXAPARIN SODIUM 30 MG: 30 INJECTION SUBCUTANEOUS at 12:08

## 2021-01-01 RX ADMIN — ATORVASTATIN CALCIUM 40 MG: 40 TABLET, FILM COATED ORAL at 21:16

## 2021-01-01 RX ADMIN — LORAZEPAM 0.25 MG: 2 INJECTION, SOLUTION INTRAMUSCULAR; INTRAVENOUS at 14:52

## 2021-01-01 RX ADMIN — DOXYCYCLINE HYCLATE 100 MG: 100 TABLET, COATED ORAL at 09:57

## 2021-01-01 RX ADMIN — OXYCODONE HYDROCHLORIDE AND ACETAMINOPHEN 500 MG: 500 TABLET ORAL at 15:24

## 2021-01-01 RX ADMIN — FLUTICASONE PROPIONATE 1 SPRAY: 50 SPRAY, METERED NASAL at 10:00

## 2021-01-01 RX ADMIN — OXYCODONE HYDROCHLORIDE AND ACETAMINOPHEN 500 MG: 500 TABLET ORAL at 09:59

## 2021-01-01 RX ADMIN — PANTOPRAZOLE SODIUM 40 MG: 40 TABLET, DELAYED RELEASE ORAL at 06:05

## 2021-01-01 RX ADMIN — DOXYCYCLINE 100 MG: 100 INJECTION, POWDER, LYOPHILIZED, FOR SOLUTION INTRAVENOUS at 19:46

## 2021-01-01 RX ADMIN — ATORVASTATIN CALCIUM 40 MG: 40 TABLET, FILM COATED ORAL at 20:07

## 2021-01-01 RX ADMIN — FOLIC ACID 1 MG: 1 TABLET ORAL at 15:25

## 2021-01-01 RX ADMIN — LANSOPRAZOLE 15 MG: KIT at 06:54

## 2021-01-01 RX ADMIN — ISOSORBIDE MONONITRATE 30 MG: 30 TABLET, EXTENDED RELEASE ORAL at 16:40

## 2021-01-01 RX ADMIN — CEFTRIAXONE SODIUM 1000 MG: 1 INJECTION, POWDER, FOR SOLUTION INTRAMUSCULAR; INTRAVENOUS at 14:07

## 2021-01-01 RX ADMIN — SODIUM CHLORIDE 500 ML: 9 INJECTION, SOLUTION INTRAVENOUS at 10:09

## 2021-01-01 RX ADMIN — ACETAMINOPHEN 650 MG: 325 TABLET ORAL at 20:05

## 2021-01-01 RX ADMIN — ATORVASTATIN CALCIUM 40 MG: 40 TABLET, FILM COATED ORAL at 22:21

## 2021-01-01 RX ADMIN — DOXYCYCLINE HYCLATE 100 MG: 100 TABLET, COATED ORAL at 22:21

## 2021-01-01 RX ADMIN — CEFTRIAXONE SODIUM 1000 MG: 1 INJECTION, POWDER, FOR SOLUTION INTRAMUSCULAR; INTRAVENOUS at 16:40

## 2021-01-01 RX ADMIN — ASPIRIN 81 MG: 81 TABLET, CHEWABLE ORAL at 09:57

## 2021-01-01 RX ADMIN — CALCITONIN SALMON 100 UNITS: 200 INJECTION, SOLUTION INTRAMUSCULAR; SUBCUTANEOUS at 10:23

## 2021-01-01 RX ADMIN — FLUTICASONE PROPIONATE 1 SPRAY: 50 SPRAY, METERED NASAL at 09:41

## 2021-01-01 RX ADMIN — FLUTICASONE PROPIONATE 1 SPRAY: 50 SPRAY, METERED NASAL at 12:45

## 2021-01-01 RX ADMIN — LEVOTHYROXINE SODIUM 100 MCG: 0.1 TABLET ORAL at 09:19

## 2021-01-01 RX ADMIN — CYANOCOBALAMIN TAB 500 MCG 500 MCG: 500 TAB at 21:16

## 2021-01-01 RX ADMIN — SODIUM CHLORIDE 8 MG/HR: 9 INJECTION, SOLUTION INTRAVENOUS at 00:00

## 2021-01-01 RX ADMIN — LEVALBUTEROL HYDROCHLORIDE 1.25 MG: 1.25 SOLUTION, CONCENTRATE RESPIRATORY (INHALATION) at 19:29

## 2021-01-01 RX ADMIN — LEVALBUTEROL HYDROCHLORIDE 1.25 MG: 1.25 SOLUTION, CONCENTRATE RESPIRATORY (INHALATION) at 19:45

## 2021-01-01 RX ADMIN — DOXYCYCLINE HYCLATE 100 MG: 100 TABLET, COATED ORAL at 21:49

## 2021-01-01 RX ADMIN — DOXYCYCLINE 100 MG: 100 INJECTION, POWDER, LYOPHILIZED, FOR SOLUTION INTRAVENOUS at 13:53

## 2021-01-01 RX ADMIN — SODIUM BICARBONATE: 84 INJECTION, SOLUTION INTRAVENOUS at 12:08

## 2021-01-01 RX ADMIN — SODIUM BICARBONATE: 84 INJECTION, SOLUTION INTRAVENOUS at 16:45

## 2021-01-01 RX ADMIN — ENOXAPARIN SODIUM 30 MG: 30 INJECTION SUBCUTANEOUS at 10:08

## 2021-01-01 RX ADMIN — LORAZEPAM 0.25 MG: 2 INJECTION, SOLUTION INTRAMUSCULAR; INTRAVENOUS at 18:57

## 2021-01-01 RX ADMIN — OXYCODONE HYDROCHLORIDE AND ACETAMINOPHEN 500 MG: 500 TABLET ORAL at 09:57

## 2021-01-01 RX ADMIN — Medication 10 ML: at 10:03

## 2021-01-01 RX ADMIN — DOXYCYCLINE 100 MG: 100 INJECTION, POWDER, LYOPHILIZED, FOR SOLUTION INTRAVENOUS at 02:05

## 2021-01-01 RX ADMIN — FUROSEMIDE 20 MG: 10 INJECTION, SOLUTION INTRAMUSCULAR; INTRAVENOUS at 09:57

## 2021-01-01 RX ADMIN — SODIUM CHLORIDE 8 MG/HR: 9 INJECTION, SOLUTION INTRAVENOUS at 11:05

## 2021-01-01 RX ADMIN — OXYCODONE HYDROCHLORIDE AND ACETAMINOPHEN 500 MG: 500 TABLET ORAL at 08:02

## 2021-01-01 RX ADMIN — DOXYCYCLINE 100 MG: 100 INJECTION, POWDER, LYOPHILIZED, FOR SOLUTION INTRAVENOUS at 05:04

## 2021-01-01 RX ADMIN — PYRIDOXINE HCL TAB 50 MG 50 MG: 50 TAB at 20:23

## 2021-01-01 RX ADMIN — CALCITONIN SALMON 100 UNITS: 200 INJECTION, SOLUTION INTRAMUSCULAR; SUBCUTANEOUS at 21:03

## 2021-01-01 RX ADMIN — LEVALBUTEROL HYDROCHLORIDE 1.25 MG: 1.25 SOLUTION, CONCENTRATE RESPIRATORY (INHALATION) at 19:38

## 2021-01-01 RX ADMIN — FUROSEMIDE 40 MG: 10 INJECTION, SOLUTION INTRAMUSCULAR; INTRAVENOUS at 21:02

## 2021-01-01 RX ADMIN — DOXYCYCLINE 100 MG: 100 INJECTION, POWDER, LYOPHILIZED, FOR SOLUTION INTRAVENOUS at 11:12

## 2021-01-01 RX ADMIN — SODIUM BICARBONATE: 84 INJECTION, SOLUTION INTRAVENOUS at 03:56

## 2021-01-01 RX ADMIN — LEVOTHYROXINE SODIUM ANHYDROUS 37.5 MCG: 100 INJECTION, POWDER, LYOPHILIZED, FOR SOLUTION INTRAVENOUS at 18:58

## 2021-01-01 RX ADMIN — FOLIC ACID 1 MG: 1 TABLET ORAL at 09:59

## 2021-01-01 RX ADMIN — DOXYCYCLINE 100 MG: 100 INJECTION, POWDER, LYOPHILIZED, FOR SOLUTION INTRAVENOUS at 18:40

## 2021-01-01 RX ADMIN — METOPROLOL SUCCINATE 25 MG: 25 TABLET, EXTENDED RELEASE ORAL at 19:14

## 2021-01-01 RX ADMIN — Medication 10 ML: at 09:13

## 2021-01-01 RX ADMIN — Medication 10 ML: at 09:35

## 2021-01-01 RX ADMIN — SODIUM CHLORIDE 1000 ML: 9 INJECTION, SOLUTION INTRAVENOUS at 13:28

## 2021-01-01 RX ADMIN — LEVOTHYROXINE SODIUM 88 MCG: 0.09 TABLET ORAL at 06:05

## 2021-01-01 RX ADMIN — MORPHINE SULFATE 0.5 MG: 2 INJECTION, SOLUTION INTRAMUSCULAR; INTRAVENOUS at 16:51

## 2021-01-01 RX ADMIN — CEFTRIAXONE SODIUM 1000 MG: 1 INJECTION, POWDER, FOR SOLUTION INTRAMUSCULAR; INTRAVENOUS at 10:35

## 2021-01-01 RX ADMIN — CEFTRIAXONE SODIUM 1000 MG: 1 INJECTION, POWDER, FOR SOLUTION INTRAMUSCULAR; INTRAVENOUS at 08:01

## 2021-01-01 RX ADMIN — LEVOTHYROXINE SODIUM 88 MCG: 0.09 TABLET ORAL at 06:39

## 2021-01-01 RX ADMIN — SODIUM CHLORIDE 500 ML: 9 INJECTION, SOLUTION INTRAVENOUS at 17:44

## 2021-01-01 RX ADMIN — LEVALBUTEROL HYDROCHLORIDE 1.25 MG: 1.25 SOLUTION, CONCENTRATE RESPIRATORY (INHALATION) at 20:34

## 2021-01-01 RX ADMIN — SODIUM CHLORIDE 25 ML: 9 INJECTION, SOLUTION INTRAVENOUS at 08:22

## 2021-01-01 RX ADMIN — DOXYCYCLINE 100 MG: 100 INJECTION, POWDER, LYOPHILIZED, FOR SOLUTION INTRAVENOUS at 05:59

## 2021-01-01 RX ADMIN — Medication 10 ML: at 10:17

## 2021-01-01 RX ADMIN — Medication 10 ML: at 22:00

## 2021-01-01 RX ADMIN — ENOXAPARIN SODIUM 40 MG: 40 INJECTION SUBCUTANEOUS at 10:00

## 2021-01-01 RX ADMIN — ENOXAPARIN SODIUM 40 MG: 40 INJECTION SUBCUTANEOUS at 08:47

## 2021-01-01 RX ADMIN — LEVOTHYROXINE SODIUM 88 MCG: 0.09 TABLET ORAL at 05:53

## 2021-01-01 RX ADMIN — FLUTICASONE PROPIONATE 1 SPRAY: 50 SPRAY, METERED NASAL at 10:45

## 2021-01-01 RX ADMIN — CALCITONIN SALMON 200 UNITS: 200 INJECTION, SOLUTION INTRAMUSCULAR; SUBCUTANEOUS at 10:08

## 2021-01-01 RX ADMIN — IRON SUCROSE 100 MG: 20 INJECTION, SOLUTION INTRAVENOUS at 15:42

## 2021-01-01 RX ADMIN — LEVOTHYROXINE SODIUM 88 MCG: 0.09 TABLET ORAL at 06:34

## 2021-01-01 RX ADMIN — SODIUM CHLORIDE 1000 ML: 9 INJECTION, SOLUTION INTRAVENOUS at 18:42

## 2021-01-01 RX ADMIN — AZITHROMYCIN MONOHYDRATE 500 MG: 500 INJECTION, POWDER, LYOPHILIZED, FOR SOLUTION INTRAVENOUS at 21:03

## 2021-01-01 RX ADMIN — CEFTRIAXONE SODIUM 1000 MG: 1 INJECTION, POWDER, FOR SOLUTION INTRAMUSCULAR; INTRAVENOUS at 18:30

## 2021-01-01 RX ADMIN — LEVOTHYROXINE SODIUM ANHYDROUS 37.5 MCG: 100 INJECTION, POWDER, LYOPHILIZED, FOR SOLUTION INTRAVENOUS at 10:17

## 2021-01-01 RX ADMIN — ASPIRIN 81 MG: 81 TABLET, CHEWABLE ORAL at 09:33

## 2021-01-01 RX ADMIN — OXYCODONE HYDROCHLORIDE AND ACETAMINOPHEN 500 MG: 500 TABLET ORAL at 12:44

## 2021-01-01 RX ADMIN — PROPOFOL 20 MG: 10 INJECTION, EMULSION INTRAVENOUS at 07:54

## 2021-01-01 RX ADMIN — SODIUM CHLORIDE: 9 INJECTION, SOLUTION INTRAVENOUS at 18:56

## 2021-01-01 RX ADMIN — ACETAMINOPHEN 650 MG: 650 SUPPOSITORY RECTAL at 17:55

## 2021-01-01 RX ADMIN — PYRIDOXINE HCL TAB 50 MG 50 MG: 50 TAB at 20:33

## 2021-01-01 RX ADMIN — SODIUM CHLORIDE 1000 ML: 9 INJECTION, SOLUTION INTRAVENOUS at 20:43

## 2021-01-01 RX ADMIN — DOXYCYCLINE 100 MG: 100 INJECTION, POWDER, LYOPHILIZED, FOR SOLUTION INTRAVENOUS at 20:20

## 2021-01-01 RX ADMIN — CYANOCOBALAMIN TAB 500 MCG 500 MCG: 500 TAB at 20:20

## 2021-01-01 RX ADMIN — PYRIDOXINE HCL TAB 50 MG 50 MG: 50 TAB at 21:16

## 2021-01-01 RX ADMIN — ATORVASTATIN CALCIUM 40 MG: 40 TABLET, FILM COATED ORAL at 20:33

## 2021-01-01 RX ADMIN — LEVOTHYROXINE SODIUM 88 MCG: 0.09 TABLET ORAL at 06:54

## 2021-01-01 RX ADMIN — IRON SUCROSE 100 MG: 20 INJECTION, SOLUTION INTRAVENOUS at 15:47

## 2021-01-01 RX ADMIN — CEFTRIAXONE SODIUM 1000 MG: 1 INJECTION, POWDER, FOR SOLUTION INTRAMUSCULAR; INTRAVENOUS at 17:45

## 2021-01-01 RX ADMIN — LEVALBUTEROL HYDROCHLORIDE 1.25 MG: 1.25 SOLUTION, CONCENTRATE RESPIRATORY (INHALATION) at 19:36

## 2021-01-01 RX ADMIN — IRON SUCROSE 100 MG: 20 INJECTION, SOLUTION INTRAVENOUS at 16:49

## 2021-01-01 RX ADMIN — CEFTRIAXONE SODIUM 1000 MG: 1 INJECTION, POWDER, FOR SOLUTION INTRAMUSCULAR; INTRAVENOUS at 08:41

## 2021-01-01 RX ADMIN — LEVALBUTEROL HYDROCHLORIDE 1.25 MG: 1.25 SOLUTION, CONCENTRATE RESPIRATORY (INHALATION) at 09:18

## 2021-01-01 RX ADMIN — OXYCODONE HYDROCHLORIDE AND ACETAMINOPHEN 500 MG: 500 TABLET ORAL at 09:11

## 2021-01-01 RX ADMIN — FLUTICASONE PROPIONATE 1 SPRAY: 50 SPRAY, METERED NASAL at 10:17

## 2021-01-01 RX ADMIN — LEVALBUTEROL HYDROCHLORIDE 1.25 MG: 1.25 SOLUTION, CONCENTRATE RESPIRATORY (INHALATION) at 19:25

## 2021-01-01 RX ADMIN — HYDROMORPHONE HYDROCHLORIDE 0.5 MG: 1 INJECTION, SOLUTION INTRAMUSCULAR; INTRAVENOUS; SUBCUTANEOUS at 10:58

## 2021-01-01 RX ADMIN — ENOXAPARIN SODIUM 40 MG: 40 INJECTION SUBCUTANEOUS at 08:01

## 2021-01-01 RX ADMIN — DOXYCYCLINE 100 MG: 100 INJECTION, POWDER, LYOPHILIZED, FOR SOLUTION INTRAVENOUS at 05:36

## 2021-01-01 RX ADMIN — LEVOTHYROXINE SODIUM 100 MCG: 0.1 TABLET ORAL at 06:39

## 2021-01-01 RX ADMIN — FOLIC ACID 1 MG: 1 TABLET ORAL at 09:12

## 2021-01-01 RX ADMIN — SODIUM CHLORIDE 8 MG/HR: 9 INJECTION, SOLUTION INTRAVENOUS at 05:59

## 2021-01-01 RX ADMIN — LEVOTHYROXINE SODIUM 88 MCG: 0.09 TABLET ORAL at 07:55

## 2021-01-01 RX ADMIN — ACETAMINOPHEN 650 MG: 325 TABLET ORAL at 23:54

## 2021-01-01 RX ADMIN — MORPHINE SULFATE 0.5 MG: 2 INJECTION, SOLUTION INTRAMUSCULAR; INTRAVENOUS at 16:22

## 2021-01-01 RX ADMIN — IRON SUCROSE 100 MG: 20 INJECTION, SOLUTION INTRAVENOUS at 15:19

## 2021-01-01 RX ADMIN — FOLIC ACID 1 MG: 1 TABLET ORAL at 09:57

## 2021-01-01 RX ADMIN — SODIUM CHLORIDE: 9 INJECTION, SOLUTION INTRAVENOUS at 06:57

## 2021-01-01 RX ADMIN — CEFTRIAXONE SODIUM 1000 MG: 1 INJECTION, POWDER, FOR SOLUTION INTRAMUSCULAR; INTRAVENOUS at 15:57

## 2021-01-01 RX ADMIN — SODIUM CHLORIDE: 9 INJECTION, SOLUTION INTRAVENOUS at 04:06

## 2021-01-01 RX ADMIN — LEVALBUTEROL HYDROCHLORIDE 1.25 MG: 1.25 SOLUTION, CONCENTRATE RESPIRATORY (INHALATION) at 08:32

## 2021-01-01 RX ADMIN — DOXYCYCLINE 100 MG: 100 INJECTION, POWDER, LYOPHILIZED, FOR SOLUTION INTRAVENOUS at 01:25

## 2021-01-01 RX ADMIN — DEXTROSE MONOHYDRATE 25 G: 25 INJECTION, SOLUTION INTRAVENOUS at 10:20

## 2021-01-01 RX ADMIN — HYDROMORPHONE HYDROCHLORIDE 0.5 MG: 2 INJECTION, SOLUTION INTRAMUSCULAR; INTRAVENOUS; SUBCUTANEOUS at 20:45

## 2021-01-01 RX ADMIN — ZOLEDRONIC ACID 4 MG: 4 INJECTION, SOLUTION, CONCENTRATE INTRAVENOUS at 01:23

## 2021-01-01 RX ADMIN — FLUTICASONE PROPIONATE 1 SPRAY: 50 SPRAY, METERED NASAL at 09:19

## 2021-01-01 RX ADMIN — CALCIUM GLUCONATE 2000 MG: 98 INJECTION, SOLUTION INTRAVENOUS at 13:53

## 2021-01-01 RX ADMIN — IPRATROPIUM BROMIDE 0.5 MG: 0.5 SOLUTION RESPIRATORY (INHALATION) at 20:29

## 2021-01-01 RX ADMIN — ENOXAPARIN SODIUM 30 MG: 30 INJECTION SUBCUTANEOUS at 08:22

## 2021-01-01 RX ADMIN — CALCITONIN SALMON 200 UNITS: 200 INJECTION, SOLUTION INTRAMUSCULAR; SUBCUTANEOUS at 14:48

## 2021-01-01 RX ADMIN — PANTOPRAZOLE SODIUM 40 MG: 40 TABLET, DELAYED RELEASE ORAL at 06:39

## 2021-01-01 RX ADMIN — QUETIAPINE FUMARATE 50 MG: 50 TABLET, EXTENDED RELEASE ORAL at 02:23

## 2021-01-01 RX ADMIN — CYANOCOBALAMIN TAB 500 MCG 500 MCG: 500 TAB at 21:49

## 2021-01-01 RX ADMIN — Medication 10 ML: at 11:16

## 2021-01-01 RX ADMIN — LORAZEPAM 0.25 MG: 2 INJECTION, SOLUTION INTRAMUSCULAR; INTRAVENOUS at 06:57

## 2021-01-01 RX ADMIN — DOXYCYCLINE HYCLATE 100 MG: 100 TABLET, COATED ORAL at 09:11

## 2021-01-01 RX ADMIN — FOLIC ACID 1 MG: 1 TABLET ORAL at 08:02

## 2021-01-01 RX ADMIN — METOPROLOL SUCCINATE 12.5 MG: 25 TABLET, EXTENDED RELEASE ORAL at 09:19

## 2021-01-01 RX ADMIN — Medication 10 ML: at 03:27

## 2021-01-01 RX ADMIN — ENOXAPARIN SODIUM 30 MG: 30 INJECTION SUBCUTANEOUS at 09:58

## 2021-01-01 RX ADMIN — Medication 10 ML: at 20:21

## 2021-01-01 RX ADMIN — MUPIROCIN: 20 OINTMENT TOPICAL at 11:13

## 2021-01-01 RX ADMIN — DOXYCYCLINE 100 MG: 100 INJECTION, POWDER, LYOPHILIZED, FOR SOLUTION INTRAVENOUS at 20:12

## 2021-01-01 RX ADMIN — ENOXAPARIN SODIUM 40 MG: 40 INJECTION SUBCUTANEOUS at 10:45

## 2021-01-01 RX ADMIN — CALCITONIN SALMON 200 UNITS: 200 INJECTION, SOLUTION INTRAMUSCULAR; SUBCUTANEOUS at 21:48

## 2021-01-01 RX ADMIN — LORAZEPAM 0.25 MG: 2 INJECTION, SOLUTION INTRAMUSCULAR; INTRAVENOUS at 22:50

## 2021-01-01 RX ADMIN — Medication 10 ML: at 09:58

## 2021-01-01 RX ADMIN — Medication 10 ML: at 08:46

## 2021-01-01 RX ADMIN — MORPHINE SULFATE 2 MG: 2 INJECTION, SOLUTION INTRAMUSCULAR; INTRAVENOUS at 23:54

## 2021-01-01 RX ADMIN — Medication 10 ML: at 20:12

## 2021-01-01 RX ADMIN — ATORVASTATIN CALCIUM 40 MG: 40 TABLET, FILM COATED ORAL at 20:04

## 2021-01-01 RX ADMIN — SODIUM CHLORIDE 80 MG: 9 INJECTION, SOLUTION INTRAVENOUS at 10:32

## 2021-01-01 RX ADMIN — FOLIC ACID 1 MG: 1 TABLET ORAL at 07:54

## 2021-01-01 RX ADMIN — CEFTRIAXONE SODIUM 1000 MG: 1 INJECTION, POWDER, FOR SOLUTION INTRAMUSCULAR; INTRAVENOUS at 19:03

## 2021-01-01 RX ADMIN — IPRATROPIUM BROMIDE 0.5 MG: 0.5 SOLUTION RESPIRATORY (INHALATION) at 10:28

## 2021-01-01 RX ADMIN — Medication 10 ML: at 12:49

## 2021-01-01 RX ADMIN — DEXTROSE AND SODIUM CHLORIDE: 5; 450 INJECTION, SOLUTION INTRAVENOUS at 13:52

## 2021-01-01 RX ADMIN — PYRIDOXINE HCL TAB 50 MG 50 MG: 50 TAB at 20:05

## 2021-01-01 RX ADMIN — DOXYCYCLINE HYCLATE 100 MG: 100 TABLET, COATED ORAL at 20:33

## 2021-01-01 RX ADMIN — FLUTICASONE PROPIONATE 1 SPRAY: 50 SPRAY, METERED NASAL at 08:21

## 2021-01-01 RX ADMIN — LEVALBUTEROL HYDROCHLORIDE 1.25 MG: 1.25 SOLUTION, CONCENTRATE RESPIRATORY (INHALATION) at 11:31

## 2021-01-01 RX ADMIN — LANSOPRAZOLE 15 MG: KIT at 06:34

## 2021-01-01 RX ADMIN — CEFTRIAXONE SODIUM 1000 MG: 1 INJECTION, POWDER, FOR SOLUTION INTRAMUSCULAR; INTRAVENOUS at 15:39

## 2021-01-01 RX ADMIN — LANSOPRAZOLE 15 MG: KIT at 09:13

## 2021-01-01 RX ADMIN — Medication 10 ML: at 08:02

## 2021-01-01 RX ADMIN — FOLIC ACID 1 MG: 1 TABLET ORAL at 09:33

## 2021-01-01 RX ADMIN — PANTOPRAZOLE SODIUM 40 MG: 40 TABLET, DELAYED RELEASE ORAL at 09:19

## 2021-01-01 RX ADMIN — OXYCODONE HYDROCHLORIDE AND ACETAMINOPHEN 500 MG: 500 TABLET ORAL at 09:33

## 2021-01-01 RX ADMIN — ATORVASTATIN CALCIUM 40 MG: 40 TABLET, FILM COATED ORAL at 20:21

## 2021-01-01 RX ADMIN — MUPIROCIN: 20 OINTMENT TOPICAL at 21:24

## 2021-01-01 RX ADMIN — CEFTRIAXONE SODIUM 1000 MG: 1 INJECTION, POWDER, FOR SOLUTION INTRAMUSCULAR; INTRAVENOUS at 18:07

## 2021-01-01 RX ADMIN — ALBUTEROL SULFATE 10 MG: 2.5 SOLUTION RESPIRATORY (INHALATION) at 10:32

## 2021-01-01 RX ADMIN — ENOXAPARIN SODIUM 40 MG: 40 INJECTION SUBCUTANEOUS at 09:57

## 2021-01-01 RX ADMIN — SODIUM CHLORIDE 1000 ML: 9 INJECTION, SOLUTION INTRAVENOUS at 17:05

## 2021-01-01 RX ADMIN — ATORVASTATIN CALCIUM 40 MG: 40 TABLET, FILM COATED ORAL at 20:20

## 2021-01-01 RX ADMIN — LANSOPRAZOLE 15 MG: KIT at 05:00

## 2021-01-01 RX ADMIN — LORAZEPAM 0.25 MG: 2 INJECTION, SOLUTION INTRAMUSCULAR; INTRAVENOUS at 02:23

## 2021-01-01 RX ADMIN — IRON SUCROSE 100 MG: 20 INJECTION, SOLUTION INTRAVENOUS at 16:04

## 2021-01-01 RX ADMIN — LEVALBUTEROL HYDROCHLORIDE 1.25 MG: 1.25 SOLUTION, CONCENTRATE RESPIRATORY (INHALATION) at 20:20

## 2021-01-01 RX ADMIN — FLUTICASONE PROPIONATE 1 SPRAY: 50 SPRAY, METERED NASAL at 10:02

## 2021-01-01 RX ADMIN — DEXTROSE MONOHYDRATE: 50 INJECTION, SOLUTION INTRAVENOUS at 12:45

## 2021-01-01 RX ADMIN — FUROSEMIDE 40 MG: 10 INJECTION, SOLUTION INTRAMUSCULAR; INTRAVENOUS at 08:47

## 2021-01-01 RX ADMIN — Medication 0.5 MG: at 10:58

## 2021-01-01 RX ADMIN — METOPROLOL SUCCINATE 12.5 MG: 25 TABLET, EXTENDED RELEASE ORAL at 16:40

## 2021-01-01 RX ADMIN — IPRATROPIUM BROMIDE 0.5 MG: 0.5 SOLUTION RESPIRATORY (INHALATION) at 15:22

## 2021-01-01 RX ADMIN — DOXYCYCLINE HYCLATE 100 MG: 100 TABLET, COATED ORAL at 09:59

## 2021-01-01 RX ADMIN — PANTOPRAZOLE SODIUM 40 MG: 40 TABLET, DELAYED RELEASE ORAL at 07:55

## 2021-01-01 RX ADMIN — SODIUM CHLORIDE: 9 INJECTION, SOLUTION INTRAVENOUS at 14:47

## 2021-01-01 RX ADMIN — ASPIRIN 81 MG: 81 TABLET, COATED ORAL at 20:20

## 2021-01-01 RX ADMIN — DOXYCYCLINE HYCLATE 100 MG: 100 TABLET, COATED ORAL at 12:45

## 2021-01-01 RX ADMIN — SODIUM CHLORIDE: 9 INJECTION, SOLUTION INTRAVENOUS at 05:10

## 2021-01-01 RX ADMIN — LIDOCAINE HYDROCHLORIDE 50 MG: 20 INJECTION, SOLUTION INFILTRATION; PERINEURAL at 07:39

## 2021-01-01 RX ADMIN — FLUTICASONE PROPIONATE 1 SPRAY: 50 SPRAY, METERED NASAL at 11:13

## 2021-01-01 RX ADMIN — POLYETHYLENE GLYCOL 3350 17 G: 17 POWDER, FOR SOLUTION ORAL at 15:14

## 2021-01-01 RX ADMIN — CYANOCOBALAMIN TAB 500 MCG 500 MCG: 500 TAB at 20:33

## 2021-01-01 RX ADMIN — ATORVASTATIN CALCIUM 40 MG: 40 TABLET, FILM COATED ORAL at 20:37

## 2021-01-01 RX ADMIN — LEVOTHYROXINE SODIUM 88 MCG: 0.09 TABLET ORAL at 06:55

## 2021-01-01 RX ADMIN — IPRATROPIUM BROMIDE 0.5 MG: 0.5 SOLUTION RESPIRATORY (INHALATION) at 18:55

## 2021-01-01 RX ADMIN — LEVALBUTEROL HYDROCHLORIDE 1.25 MG: 1.25 SOLUTION, CONCENTRATE RESPIRATORY (INHALATION) at 07:38

## 2021-01-01 RX ADMIN — ATORVASTATIN CALCIUM 40 MG: 40 TABLET, FILM COATED ORAL at 20:13

## 2021-01-01 RX ADMIN — ASPIRIN 300 MG: 300 SUPPOSITORY RECTAL at 06:59

## 2021-01-01 RX ADMIN — DOXYCYCLINE 100 MG: 100 INJECTION, POWDER, LYOPHILIZED, FOR SOLUTION INTRAVENOUS at 20:04

## 2021-01-01 RX ADMIN — PANTOPRAZOLE SODIUM 40 MG: 40 TABLET, DELAYED RELEASE ORAL at 10:17

## 2021-01-01 RX ADMIN — ATORVASTATIN CALCIUM 40 MG: 40 TABLET, FILM COATED ORAL at 21:49

## 2021-01-01 RX ADMIN — PROPOFOL 20 MG: 10 INJECTION, EMULSION INTRAVENOUS at 07:50

## 2021-01-01 RX ADMIN — Medication 10 ML: at 21:16

## 2021-01-01 RX ADMIN — Medication 10 ML: at 21:24

## 2021-01-01 RX ADMIN — FOLIC ACID 1 MG: 1 TABLET ORAL at 12:45

## 2021-01-01 RX ADMIN — DOXYCYCLINE 100 MG: 100 INJECTION, POWDER, LYOPHILIZED, FOR SOLUTION INTRAVENOUS at 13:40

## 2021-01-01 RX ADMIN — Medication 10 ML: at 22:21

## 2021-01-01 RX ADMIN — CALCITONIN SALMON 200 UNITS: 200 INJECTION, SOLUTION INTRAMUSCULAR; SUBCUTANEOUS at 09:59

## 2021-01-01 RX ADMIN — Medication 10 ML: at 20:33

## 2021-01-01 RX ADMIN — DOXYCYCLINE 100 MG: 100 INJECTION, POWDER, LYOPHILIZED, FOR SOLUTION INTRAVENOUS at 01:33

## 2021-01-01 RX ADMIN — CYANOCOBALAMIN TAB 500 MCG 500 MCG: 500 TAB at 22:21

## 2021-01-01 RX ADMIN — ASPIRIN 81 MG: 81 TABLET, CHEWABLE ORAL at 12:44

## 2021-01-01 RX ADMIN — DOXYCYCLINE 100 MG: 100 INJECTION, POWDER, LYOPHILIZED, FOR SOLUTION INTRAVENOUS at 12:50

## 2021-01-01 RX ADMIN — Medication 10 ML: at 20:37

## 2021-01-01 RX ADMIN — LEVOTHYROXINE SODIUM 88 MCG: 0.09 TABLET ORAL at 05:00

## 2021-01-01 RX ADMIN — PYRIDOXINE HCL TAB 50 MG 50 MG: 50 TAB at 21:48

## 2021-01-01 RX ADMIN — DOXYCYCLINE HYCLATE 100 MG: 100 TABLET, COATED ORAL at 09:33

## 2021-01-01 RX ADMIN — INSULIN HUMAN 10 UNITS: 100 INJECTION, SOLUTION PARENTERAL at 10:20

## 2021-01-01 RX ADMIN — DOXYCYCLINE 100 MG: 100 INJECTION, POWDER, LYOPHILIZED, FOR SOLUTION INTRAVENOUS at 19:18

## 2021-01-01 RX ADMIN — CALCITONIN SALMON 200 UNITS: 200 INJECTION, SOLUTION INTRAMUSCULAR; SUBCUTANEOUS at 20:25

## 2021-01-01 RX ADMIN — SODIUM CHLORIDE: 9 INJECTION, SOLUTION INTRAVENOUS at 14:08

## 2021-01-01 RX ADMIN — PYRIDOXINE HCL TAB 50 MG 50 MG: 50 TAB at 22:24

## 2021-01-01 RX ADMIN — FLUTICASONE PROPIONATE 1 SPRAY: 50 SPRAY, METERED NASAL at 08:08

## 2021-01-01 RX ADMIN — LEVALBUTEROL HYDROCHLORIDE 1.25 MG: 1.25 SOLUTION, CONCENTRATE RESPIRATORY (INHALATION) at 08:00

## 2021-01-01 RX ADMIN — DOXYCYCLINE 100 MG: 100 INJECTION, POWDER, LYOPHILIZED, FOR SOLUTION INTRAVENOUS at 00:52

## 2021-01-01 RX ADMIN — IRON SUCROSE 100 MG: 20 INJECTION, SOLUTION INTRAVENOUS at 18:20

## 2021-01-01 RX ADMIN — CYANOCOBALAMIN TAB 500 MCG 500 MCG: 500 TAB at 20:04

## 2021-01-01 RX ADMIN — DOXYCYCLINE 100 MG: 100 INJECTION, POWDER, LYOPHILIZED, FOR SOLUTION INTRAVENOUS at 10:26

## 2021-01-01 ASSESSMENT — PAIN SCALES - GENERAL
PAINLEVEL_OUTOF10: 3
PAINLEVEL_OUTOF10: 0
PAINLEVEL_OUTOF10: 0
PAINLEVEL_OUTOF10: 7
PAINLEVEL_OUTOF10: 0
PAINLEVEL_OUTOF10: 5
PAINLEVEL_OUTOF10: 0
PAINLEVEL_OUTOF10: 3
PAINLEVEL_OUTOF10: 0
PAINLEVEL_OUTOF10: 4
PAINLEVEL_OUTOF10: 0
PAINLEVEL_OUTOF10: 5
PAINLEVEL_OUTOF10: 0
PAINLEVEL_OUTOF10: 3
PAINLEVEL_OUTOF10: 0
PAINLEVEL_OUTOF10: 0
PAINLEVEL_OUTOF10: 5
PAINLEVEL_OUTOF10: 4
PAINLEVEL_OUTOF10: 0
PAINLEVEL_OUTOF10: 5
PAINLEVEL_OUTOF10: 0
PAINLEVEL_OUTOF10: 10
PAINLEVEL_OUTOF10: 0

## 2021-01-01 ASSESSMENT — ENCOUNTER SYMPTOMS
SHORTNESS OF BREATH: 0
SORE THROAT: 0
CONSTIPATION: 0
SORE THROAT: 0
EYE ITCHING: 0
VOICE CHANGE: 0
BLOOD IN STOOL: 1
VOMITING: 0
STRIDOR: 0
EYE PAIN: 0
DIARRHEA: 0
CHEST TIGHTNESS: 0
COUGH: 0
EYE DISCHARGE: 0
EYE PAIN: 0
BACK PAIN: 0
ABDOMINAL PAIN: 0
NAUSEA: 0
SHORTNESS OF BREATH: 1
CHOKING: 0
ABDOMINAL PAIN: 0

## 2021-01-01 ASSESSMENT — PAIN DESCRIPTION - ORIENTATION: ORIENTATION: LOWER;MID

## 2021-01-01 ASSESSMENT — PAIN DESCRIPTION - LOCATION
LOCATION: BUTTOCKS
LOCATION: PENIS
LOCATION: ABDOMEN;GROIN
LOCATION: KNEE

## 2021-01-01 ASSESSMENT — PAIN DESCRIPTION - FREQUENCY: FREQUENCY: CONTINUOUS

## 2021-01-01 ASSESSMENT — PAIN DESCRIPTION - PAIN TYPE
TYPE: ACUTE PAIN

## 2021-01-01 ASSESSMENT — PAIN SCALES - WONG BAKER: WONGBAKER_NUMERICALRESPONSE: 0

## 2021-01-01 ASSESSMENT — SLEEP AND FATIGUE QUESTIONNAIRES
HOW LIKELY ARE YOU TO NOD OFF OR FALL ASLEEP WHILE SITTING QUIETLY AFTER LUNCH WITHOUT ALCOHOL: 0
HOW LIKELY ARE YOU TO NOD OFF OR FALL ASLEEP WHILE SITTING AND TALKING TO SOMEONE: 0
HOW LIKELY ARE YOU TO NOD OFF OR FALL ASLEEP IN A CAR, WHILE STOPPED FOR A FEW MINUTES IN TRAFFIC: 0

## 2021-01-01 ASSESSMENT — PAIN DESCRIPTION - DESCRIPTORS: DESCRIPTORS: ACHING;DISCOMFORT

## 2021-02-24 PROBLEM — K92.2 ACUTE GI BLEEDING: Status: ACTIVE | Noted: 2021-01-01

## 2021-02-24 NOTE — ED PROVIDER NOTES
[address]        Pt Name: Prince Young  MRN: [id number]  [place] 6/9/1925  Date of evaluation: 2/24/2021  Provider: NAOMI Ward  PCP: Priscilla Lindo MD     I have seen and evaluated this patient with my supervising physician Naomy Brooks DO.    CHIEF COMPLAINT       Chief Complaint   Patient presents with    Rectal Bleeding     saw in [place] for pna, daughter said working with doc and that blood numbers were down and needed blood transfusion       HISTORY OF PRESENT ILLNESS   (Location, Timing/Onset, Context/Setting, Quality, Duration, Modifying Factors, Severity, Associated Signs and Symptoms)  Note limiting factors. Prince Young is a 80 y.o. male recently discharged from outside hospital in [place] on 2/17 presents emergency department for rectal bleeding. After being discharged from [place], his daughter moved him into a nursing home ([facility]) to be closer to her and family. 4 days ago he was up and walking around, exploring [employer] and asking to play pool with son-in-law. Over the last 2 days has been lethargic, mostly nonambulatory. Associated dark, bloody stools. Went to primary care provider Dr. Sandeep Mendoza yesterday, was noted to be hypoxic in the 76s and they recommended that he be admitted to the hospital.  Patient and daughter wanted to have the patient have labs drawn and go home while awaiting results of the testing. Primary care provider called in today noted that hemoglobin of 5.5, creatinine 3.5, elevated potassium. They recommend he come to the emergency department for further treatment. Patient and daughter state that they would like to proceed with all measures to prolong life. P patient has dull, aching substernal chest pain. Denies nausea, vomiting, abdominal pain, shortness of breath, dysuria, urinary frequency. Nursing Notes were all reviewed and agreed with or any disagreements were addressed in the HPI. REVIEW OF SYSTEMS    (2-9 systems for level 4, 10 or more for level 5)     Review of Systems   Constitutional: Positive for fatigue. Negative for fever. HENT: Negative for sore throat. Eyes: Negative for pain and visual disturbance. Respiratory: Negative for cough and shortness of breath. Cardiovascular: Positive for chest pain. Gastrointestinal: Positive for blood in stool. Negative for abdominal pain, nausea and vomiting. Genitourinary: Negative for dysuria and frequency. Musculoskeletal: Negative for back pain and neck pain. Skin: Positive for pallor. Negative for rash. Neurological: Negative for weakness, numbness and headaches. Psychiatric/Behavioral: Negative for confusion. Positives and Pertinent negatives as per HPI. Except as noted above in the ROS, all other systems were reviewed and negative. PAST MEDICAL HISTORY   History reviewed. No pertinent past medical history. SURGICAL HISTORY   History reviewed. No pertinent surgical history.       [address]       Current Discharge Medication List      CONTINUE these medications which have NOT CHANGED    Details   ipratropium-albuterol (DUONEB) 0.5-2.5 (3) MG/3ML SOLN nebulizer solution Inhale 1 vial into the lungs every 4 hours      isosorbide mononitrate (IMDUR) 30 MG extended release tablet Take 30 mg by mouth daily      levothyroxine (SYNTHROID) 88 MCG tablet Take 88 mcg by mouth Daily      metoprolol succinate (TOPROL XL) 25 MG extended release tablet Take 25 mg by mouth daily      omeprazole (PRILOSEC) 20 MG delayed release capsule Take 40 mg by mouth daily      patiromer sorbitex calcium (VELTASSA) 8.4 g PACK packet Take 8.4 g by mouth daily      coenzyme Q-10 100 MG capsule Take 100 mg by mouth daily      Flaxseed, Linseed, (FLAXSEED OIL) 1000 MG CAPS Take by mouth fluticasone (FLONASE) 50 MCG/ACT nasal spray 1 spray by Each Nostril route daily      folic acid (FOLVITE) 1 MG tablet Take 1 mg by mouth daily      dextromethorphan-guaiFENesin (MUCINEX DM)  MG per extended release tablet Take 1 tablet by mouth every 12 hours as needed      Ascorbic Acid (VITAMIN C) 250 MG tablet Take 500 mg by mouth daily               ALLERGIES     Albuterol    FAMILYHISTORY     History reviewed. No pertinent family history. SOCIAL HISTORY       Social History     Tobacco Use    Smoking status: Not on file   Substance Use Topics    Alcohol use: Not on file    Drug use: Not on file       SCREENINGS    [place] Coma Scale  Eye Opening: Spontaneous  Best Verbal Response: Oriented  Best Motor Response: Obeys commands  Radha Coma Scale Score: 15        PHYSICAL EXAM    (up to 7 for level 4, 8 or more for level 5)     ED Triage Vitals   BP Temp Temp Source Pulse Resp SpO2 Height Weight   02/24/21 0923 02/24/21 0923 02/24/21 0923 02/24/21 0923 02/24/21 0923 02/24/21 0926 02/24/21 0926 02/24/21 0926   (!) 94/53 97.5 °F (36.4 °C) Oral 68 18 92 % 5' 5\" (1.651 m) 150 lb (68 kg)       Physical Exam  Vitals signs reviewed. Constitutional:       Appearance: He is not diaphoretic. Comments: Hypotensive with map of 61. Alert lethargic. HENT:      Nose: No congestion or rhinorrhea. Eyes:      General: No scleral icterus. Conjunctiva/sclera: Conjunctivae normal.   Neck:      Musculoskeletal: Normal range of motion and neck supple. Cardiovascular:      Rate and Rhythm: Normal rate and regular rhythm. Pulses: Normal pulses. Heart sounds: Normal heart sounds. No murmur. No friction rub. No gallop. Pulmonary:      Effort: Pulmonary effort is normal. No respiratory distress. Breath sounds: Normal breath sounds. No stridor. No wheezing, rhonchi or rales. Comments: Satting 100% on 2 L nasal cannula. Abdominal:      General: There is no distension. Palpations: Abdomen is soft. Tenderness: There is no abdominal tenderness. There is no right CVA tenderness, left CVA tenderness, guarding or rebound. Musculoskeletal: Normal range of motion. Skin:     General: Skin is warm and dry. Capillary Refill: Capillary refill takes 2 to 3 seconds. Coloration: Skin is pale. Neurological:      General: No focal deficit present. Mental Status: He is alert and oriented to person, place, and time. Sensory: No sensory deficit. Motor: No weakness.    Psychiatric:         Mood and Affect: Mood normal.         Behavior: Behavior normal.         DIAGNOSTIC RESULTS   LABS:    Labs Reviewed   CBC WITH AUTO DIFFERENTIAL - Abnormal; Notable for the following components:       Result Value    RBC 1.76 (*)     Hemoglobin 6.0 (*)     Hematocrit 18.6 (*)     .2 (*)     MCH 34.1 (*)     RDW 16.1 (*)     All other components within normal limits    Narrative:     Margarita Back tel. [phone],  Hematology results called to and read back by ANTONINO Beverly, 02/24/2021  10:13, by [facility]  Performed at:  [address],  [address] [place] [address], [facility]   Phone [phone]   COMPREHENSIVE METABOLIC PANEL W/ REFLEX TO MG FOR LOW K - Abnormal; Notable for the following components:    Potassium reflex Magnesium 6.0 (*)     CO2 19 (*)     Glucose 136 (*)     BUN 69 (*)     CREATININE 4.1 (*)     GFR Non- 14 (*)     GFR  16 (*)     AST 44 (*)     All other components within normal limits    Narrative:     Margarita Back tel. [phone],  Chemistry results called to and read back by Clotilde Beverly RN, 02/24/2021 10:03,  by [facility]  Performed at:  [address],  [address] [place] [address], [address]   Phone [phone]   LACTATE, SEPSIS - Abnormal; Notable for the following components:    Lactic Acid, Sepsis 2.2 (*)     All other components within normal limits Narrative:     Performed at:  [facility]  [address],  [address], [address]   Phone [phone]   LACTATE, SEPSIS - Abnormal; Notable for the following components:    Lactic Acid, Sepsis 2.7 (*)     All other components within normal limits    Narrative:     Performed at:  [facility]  [address],  [address], [address]   Phone [phone]   TROPONIN - Abnormal; Notable for the following components:    Troponin 0.14 (*)     All other components within normal limits    Narrative:     Kamryn Crespo tel. [phone],  Chemistry results called to and read back by Wyatt Sinclair RN, 02/24/2021 10:03,  by [facility]  Performed at:  [facility]  [address],  [address] [facility] [address], [address]   Phone [phone]   TSH WITH REFLEX - Abnormal; Notable for the following components:    TSH 55.23 (*)     All other components within normal limits    Narrative:     Performed at:  [address]   Phone [phone]   URIC ACID - Abnormal; Notable for the following components:    Uric Acid, Serum 8.5 (*)     All other components within normal limits    Narrative:     Kamryn Crespo tel. [phone],  Chemistry results called to and read back by Wyatt Sinclair RN, 02/24/2021 10:03,  by [facility]  Performed at:  [place] [address],  [address], [address]   Phone [phone], RAPID    Narrative:     Performed at:  [facility]  [address],  [address], [address]   Phone [phone]   PROTIME-INR    Narrative:     Performed at:  [facility]  [address],  [address], [address]   Phone [phone]   APTT    Narrative:     Performed at:  [address] [place] [address] [address] [facility], [address]   Phone [phone]   T4, FREE    Narrative:     Performed at:  [facility]  [address] [facility] [address]   Phone [phone]   URINE RT REFLEX TO CULTURE   SODIUM, URINE, RANDOM   CREATININE, RANDOM URINE   BLOOD OCCULT STOOL SCREEN #1   HEMOGLOBIN AND HEMATOCRIT, BLOOD   HEMOGLOBIN AND HEMATOCRIT, BLOOD   BASIC METABOLIC PANEL W/ REFLEX TO MG FOR LOW K   POC URINE WITH MICROSCOPIC   TYPE AND SCREEN    Narrative:     Performed at:  [facility]) [facility]  [address] [facility], [address]   Phone [phone]   PREPARE RBC (CROSSMATCH)   PREPARE RBC (CROSSMATCH)       All other labs were within normal range or not returned as of this dictation. EKG: All EKG's are interpreted by the Emergency Department Physician in the absence of a cardiologist.  Please see their note for interpretation of EKG. RADIOLOGY:   Non-plain film images such as CT, Ultrasound and MRI are read by the radiologist. Plain radiographic images are visualized and preliminarily interpreted by the ED Provider with the below findings:        Interpretation per the Radiologist below, if available at the time of this note:    XR CHEST PORTABLE   Final Result   Patchy nodular opacities are noted in the right mid and lower lung which may   reflect airspace disease in the correct clinical setting. Malignancy cannot   be excluded. Further evaluation with chest CT is recommended. No results found.         PROCEDURES   Unless otherwise noted below, none     Procedures    CRITICAL CARE TIME Due to the immediate potential for life-threatening deterioration due to GI bleed with anemia, hypotension, I spent 50 minutes providing critical care. Rationale was acute risk for hemodynamic collapse. Critical intervention was initiation of blood product transfusion, consultation with GI and hospitalist.  This time is excluding time spent performing procedures.     CONSULTS:  IP CONSULT TO GI  IP CONSULT TO NEPHROLOGY  IP CONSULT TO CARDIOLOGY      EMERGENCY DEPARTMENT COURSE and DIFFERENTIAL DIAGNOSIS/MDM:   Vitals:    Vitals:    02/24/21 1503 02/24/21 1546 02/24/21 1600 02/24/21 1615   BP: 130/61 (!) 148/78 (!) 143/67 134/63   Pulse: 66 70 68 66   Resp: 16 12 11 10   Temp:  97.6 °F (36.4 °C)     TempSrc:  Oral     SpO2: 96% 100% 100% 98%   Weight:  131 lb 13.4 oz (59.8 kg)     Height:           Patient was given the following medications:  Medications   pantoprazole (PROTONIX) 80 mg in sodium chloride 0.9 % 100 mL infusion (8 mg/hr Intravenous Restarted 2/24/21 1500)   0.9 % sodium chloride infusion (has no administration in time range)   0.9 % sodium chloride infusion (has no administration in time range)   sodium chloride flush 0.9 % injection 10 mL (has no administration in time range)   sodium chloride flush 0.9 % injection 10 mL (has no administration in time range)   promethazine (PHENERGAN) tablet 12.5 mg (has no administration in time range)     Or   ondansetron (ZOFRAN) injection 4 mg (has no administration in time range)   acetaminophen (TYLENOL) tablet 650 mg (has no administration in time range)     Or   acetaminophen (TYLENOL) suppository 650 mg (has no administration in time range)   levothyroxine (SYNTHROID) injection 37.5 mcg (has no administration in time range)   fluticasone (FLONASE) 50 MCG/ACT nasal spray 1 spray (has no administration in time range)   ipratropium (ATROVENT) 0.02 % nebulizer solution 0.5 mg (has no administration in time range) Current Discharge Medication List          DISCONTINUED MEDICATIONS:  Current Discharge Medication List                 (Please note that portions of this note were completed with a voice recognition program.  Efforts were made to edit the dictations but occasionally words are mis-transcribed.)    NAOMI Gold (electronically signed)         NAOMI Gold  02/24/21 [place], [place]  02/24/21 0277

## 2021-02-24 NOTE — ED NOTES
Due to medex being down and not being able to print blood consent, RN got verbal consent from daughter LUIS ALBERTO and pt      Des Mcmahan RN  02/24/21 101

## 2021-02-24 NOTE — ED NOTES
Pt and pt daughter very adamant that they do not want a catheter put in. Pt can pee in a urinal if need. There was trauma the last time they put a catheter in.      Olden Mcburney, RN  02/24/21 2121

## 2021-02-24 NOTE — ED NOTES
Called GI consult @0432  Re: GI bleed per PA-Blaise [email or web address]  02/24/21 1203

## 2021-02-24 NOTE — CONSULTS
Elev trop  GIB  Anemia  ANGEL  CAD  S/P CABG  HTN  PPM  HLD    No ASA or AC due to anemia, GIB  Cont BBlk  Add statin  Echo  Med mgmt CAD, elev trop

## 2021-02-24 NOTE — ED NOTES
Pt having pain in right foot, he sts it like muscle spasm      Uriel Cisneros, ANTONINO  02/24/21 4793

## 2021-02-24 NOTE — CONSULTS
TID      sodium bicarbonate 150 mEq in dextrose 5 % 1,000 mL infusion, Continuous      0.9 % sodium chloride bolus, Once      metoprolol succinate (TOPROL XL) extended release tablet 25 mg, Daily      atorvastatin (LIPITOR) tablet 40 mg, Nightly      perflutren lipid microspheres (DEFINITY) injection 1.65 mg, ONCE PRN        Review of Systems:   14 point ROS obtained but were negative except mentioned in HPI      Physical exam:     Vitals:  /65   Pulse 64   Temp 97.6 °F (36.4 °C) (Oral)   Resp 12   Ht 5' 5\" (1.651 m)   Wt 131 lb 13.4 oz (59.8 kg)   SpO2 97%   BMI 21.94 kg/m²   Constitutional:  OAA X3 NAD  Skin: no rash, turgor wnl  Heent:  eomi, mmm  Neck: no bruits or jvd noted  Cardiovascular:  S1, S2 without m/r/g  Respiratory: CTA B without w/r/r  Abdomen:  +bs, soft, nt, nd  Ext: no lower extremity edema  Psychiatric: mood and affect appropriate  Musculoskeletal:  Rom, muscular strength intact    Data:   Labs:  CBC:   Recent Labs     02/24/21  0932   WBC 8.8   HGB 6.0*        BMP:    Recent Labs     02/24/21  0932      K 6.0*      CO2 19*   BUN 69*   CREATININE 4.1*   GLUCOSE 136*     Ca/Mg/Phos:   Recent Labs     02/24/21  0932   CALCIUM 10.4     Hepatic:   Recent Labs     02/24/21  0932   AST 44*   ALT 29   BILITOT 0.4   ALKPHOS 57     Troponin:   Recent Labs     02/24/21  0932   TROPONINI 0.14*     BNP: No results for input(s): BNP in the last 72 hours. Lipids: No results for input(s): CHOL, TRIG, HDL, LDLCALC, LABVLDL in the last 72 hours. ABGs: No results for input(s): PHART, PO2ART, THH7WLY in the last 72 hours. INR:   Recent Labs     02/24/21  0932   INR 1.09     UA:No results for input(s): Dorsie Mino, GLUCOSEU, BILIRUBINUR, Bernette Sharper, BLOODU, PHUR, PROTEINU, UROBILINOGEN, NITRU, LEUKOCYTESUR, Zamzam Needle in the last 72 hours. Urine Microscopic: No results for input(s): LABCAST, BACTERIA, COMU, HYALCAST, WBCUA, RBCUA, EPIU in the last 72 hours. Urine Culture: No results for input(s): LABURIN in the last 72 hours. Urine Chemistry: No results for input(s): Lysle Naegeli, PROTEINUR, NAUR in the last 72 hours. IMAGING:  XR CHEST PORTABLE   Final Result   Patchy nodular opacities are noted in the right mid and lower lung which may   reflect airspace disease in the correct clinical setting. Malignancy cannot   be excluded. Further evaluation with chest CT is recommended. Assessment/Plan       1.  Acute renal failure on CKD stage 4      Baseline serum cr as per chart review ~ 2.9       Serum cr on admission 4.1      Etiology seems to be combination of pre renal  renal hypoperfusion due to GI bleed, intravascular   volume depletion       ATN component due to hypotension       Plan      Expand intravascular space with IVF Na bicarb 150 meq @ 100 ml/hr      Will give 1 L NS bolus now      Check Urine Na, cr check urine microalbunmin/cr    Renal US      Avoid contrast    Keep MAP > 65 mmhg    Medication dose as per GFR    Avoid ACEI or ARB      2 Hyperkalemia in setting of ANGEL and acidosis      Got temporizing measure in ED : ca gluconate + dextrose + insulin      Will give Na Bicarb fluid and repeat BMP        3 Non anion gap metabolic acidosis      Cont IVF            4. GI bleed   PPI   transfuse prn     5 Anemia due to GI bleed                   Thank you for allowing us to participate in care of [address] free to contact me   [facility] [address]  Office : [phone]  Fax :[phone]

## 2021-02-24 NOTE — ED NOTES
Wasted 0.5mg of Dilaudid with Rylee BLANCO  Because it did not show up in [facility]  02/24/21 0212

## 2021-02-24 NOTE — ED PROVIDER NOTES
Platelets [phone] - 353 K/uL    MPV 8.6 5.0 - 10.5 fL    Neutrophils % 79.7 %    Lymphocytes % 11.4 %    Monocytes % 6.8 %    Eosinophils % 0.8 %    Basophils % 1.3 %    Neutrophils Absolute 7.0 1.7 - 7.7 K/uL    Lymphocytes Absolute 1.0 1.0 - 5.1 K/uL    Monocytes Absolute 0.6 0.0 - 1.3 K/uL    Eosinophils Absolute 0.1 0.0 - 0.6 K/uL    Basophils Absolute 0.1 0.0 - 0.2 K/uL   Comprehensive Metabolic Panel w/ Reflex to MG   Result Value Ref Range    Sodium 138 136 - 145 mmol/L    Potassium reflex Magnesium 6.0 (HH) 3.5 - 5.1 mmol/L    Chloride 105 99 - 110 mmol/L    CO2 19 (L) 21 - 32 mmol/L    Anion Gap 14 3 - 16    Glucose 136 (H) 70 - 99 mg/dL    BUN 69 (H) 7 - 20 mg/dL    CREATININE 4.1 (H) 0.8 - 1.3 mg/dL    GFR Non-African American 14 (A) >60    GFR  16 (A) >60    Calcium 10.4 8.3 - 10.6 mg/dL    Total Protein 6.6 6.4 - 8.2 g/dL    Albumin 3.7 3.4 - 5.0 g/dL    Albumin/Globulin Ratio 1.3 1.1 - 2.2    Total Bilirubin 0.4 0.0 - 1.0 mg/dL    Alkaline Phosphatase 57 40 - 129 U/L    ALT 29 10 - 40 U/L    AST 44 (H) 15 - 37 U/L    Globulin 2.9 g/dL   Protime-INR   Result Value Ref Range    Protime 12.6 10.0 - 13.2 sec    INR 1.09 0.86 - 1.14   APTT   Result Value Ref Range    aPTT 30.1 24.2 - 36.2 sec   Lactate, Sepsis   Result Value Ref Range    Lactic Acid, Sepsis 2.2 (H) 0.4 - 1.9 mmol/L   Lactate, Sepsis   Result Value Ref Range    Lactic Acid, Sepsis 2.7 (H) 0.4 - 1.9 mmol/L   Troponin   Result Value Ref Range    Troponin 0.14 (H) <0.01 ng/mL   EKG 12 Lead   Result Value Ref Range    Ventricular Rate 67 BPM    Atrial Rate 67 BPM    P-R Interval 148 ms    QRS Duration 158 ms    Q-T Interval 474 ms    QTc Calculation (Bazett) 500 ms    P Axis 15 degrees    R Axis -85 degrees    T Axis 34 degrees    Diagnosis       Electronic ventricular pacemakerNo previous ECGs available   TYPE AND SCREEN   Result Value Ref Range    ABO/Rh O NEG     Antibody Screen NEG    PREPARE RBC (CROSSMATCH), 1 Units Result Value Ref Range    Product Code Blood Bank [id number]     Description Blood Bank Red Blood Cells, Leuko-reduced     Unit Number [id number]     Dispense Status Blood Bank issued     Product Code Blood Bank [id number]     Description Blood Bank Red Blood Cells, Leuko-reduced     Unit Number [id number]     Dispense Status Blood Bank transfused        EKG:  Paced rhythm with a rate of 67. Normal axis. Normal intervals and durations. No previous EKG. RADIOLOGY  X-RAYS:  I have reviewed radiologic plain film image(s). ALL OTHER NON-PLAIN FILM IMAGES SUCH AS CT, ULTRASOUND AND MRI HAVE BEEN READ BY THE RADIOLOGIST. XR CHEST PORTABLE   Final Result   Patchy nodular opacities are noted in the right mid and lower lung which may   reflect airspace disease in the correct clinical setting. Malignancy cannot   be excluded. Further evaluation with chest CT is recommended. ED COURSE/MDM  Patient seen and evaluated. Old records reviewed. Labs and imaging reviewed and results discussed with patient. Patient was given Protonix, and blood in the ED with good symptomatic relief. Patient was reassessed as noted above and will be admitted to hospitalist staff. Plan of care discussed with patient and family. Patient and family in agreement with plan. Patient was given scripts for the following medications. I counseled patient how to take these medications. New Prescriptions    No medications on file       CLINICAL IMPRESSION  1. Gastrointestinal hemorrhage, unspecified gastrointestinal hemorrhage type        Blood pressure (!) 115/57, pulse 66, temperature 97.5 °F (36.4 °C), temperature source Oral, resp. rate 16, height 5' 5\" (1.651 m), weight 150 lb (68 kg), SpO2 92 %. Alban Lancaster was admitted in stable condition.         Felix Chowdhury, DO  02/24/21 15-[address],   02/25/21 9884

## 2021-02-24 NOTE — CONSULTS
[facility]    [address] [facility] ,  [address] [facility]  Phone: [phone] [facility],  [address] [facility], [address] [place] [address]  Phone: [phone]   XZV:[phone]    Gastroenterology H&P/Consult Note    Chief Complaint   Patient presents with    Rectal Bleeding     saw in [place] for pna, daughter said working with doc and that blood numbers were down and needed blood transfusion       HPI     Thank you No ref. provider found and Lincoln Morel MD for asking me to see Taryn Perdue in consultation. He is a 80y.o. year old malewith medical history of hypothyroidism, coronary artery disease status post CABG and pacemaker, atrial flutter on Eliquis, CKD stage IV, hypertension presents with complaints of Acute GI bleeding [K92.2]. The documented principal problem is <principal problem not specified> and chief complaint is Rectal Bleeding (saw in [place] for pna, daughter said working with doc and that blood numbers were down and needed blood transfusion)  . Date of Admission:  2/24/2021  Date of Consultation:  2/24/2021    GI has been consulted for melenic stools and anemia hemoglobin 6 g/dL on admission. Vitals remain stable. Transfusing  2 units PRBC in the ED. Patient is lethargic after IV Dilaudid given for leg pain per nurse, such that he is unable to contribute significantly to the history. The patient's daughter Reshma Bahena, patient had dark stools for the past 2 days, however bowel movement this morning was brown in color. She reports that patient has not taken Eliquis since February 11 when he was admitted for pneumonia. Last Encounter Reviewed: None  Pertinent PMH, FH, SH is reviewed below.   Last EGD: None  Last Colonoscopy: Unknown    Health Maintenance   Topic Date Due    COVID-19 Vaccine (1 of 2) 06/09/1941    DTaP/Tdap/Td vaccine (1 - Tdap) 06/09/1944    Shingles Vaccine (1 of 2) 06/09/1975  Pneumococcal 65+ years Vaccine (1 of 1 - PPSV23) 06/09/1990    Flu vaccine (1) 09/01/2020    Hepatitis A vaccine  Aged Out    Hepatitis B vaccine  Aged Out    Hib vaccine  Aged Out    Meningococcal (ACWY) vaccine  Aged Out     PAST MEDICAL HISTORY   History reviewed. No pertinent past medical history. FAMILY HISTORY   History reviewed. No pertinent family history. SOCIAL HISTORY     Social History     Tobacco Use    Smoking status: Not on file   Substance Use Topics    Alcohol use: Not on file    Drug use: Not on file     SURGICAL HISTORY   History reviewed. No pertinent surgical history. ALLERGIES     Allergies   Allergen Reactions    Albuterol      CURRENT MEDICATIONS        sodium chloride flush  10 mL Intravenous 2 times per day    levothyroxine  37.5 mcg Intravenous Daily    fluticasone  1 spray Each Nostril Daily    ipratropium  0.5 mg Nebulization TID      pantoprozole (PROTONIX) infusion 8 mg/hr (02/24/21 1500)    sodium chloride      sodium chloride      IV infusion builder       sodium chloride, sodium chloride, sodium chloride flush, promethazine **OR** ondansetron, acetaminophen **OR** acetaminophen  HOME MEDICATIONS  [unfilled]  IMMUNIZATIONS     There is no immunization history on file for this patient. REVIEW OF SYSTEMS   See HPI for further details and pertinent postiives. Negative for the following:  Constitutional: Negative for weight change. Negative for appetite change and fatigue. HENT: Negative for nosebleeds, sore throat, mouth sores, trouble swallowing and voice change. Respiratory: Negative for cough, choking and chest tightness. Cardiovascular: Negative for chest pain   Gastrointestinal: See HPI  Musculoskeletal: Negative for arthralgias. Skin: Negative for pallor. Neurological: Negative for weakness and light-headedness. Hematological: Negative for adenopathy. Does not bruise/bleed easily. Psychiatric/Behavioral: Negative for suicidal ideas. PHYSICAL EXAM   VITAL SIGNS: /67   Pulse 64   Temp 97.6 °F (36.4 °C) (Oral)   Resp 13   Ht 5' 5\" (1.651 m)   Wt 131 lb 13.4 oz (59.8 kg)   SpO2 100%   BMI 21.94 kg/m²   Review of available records reveals: Wt Readings from Last 50 Encounters:   02/24/21 131 lb 13.4 oz (59.8 kg)     Constitutional: Elderly male, drowsy, well developed, Well nourished, No acute distress,   HENT: Normocephalic, Atraumatic, Bilateral external ears normal, Oropharynx moist, No oral exudates, Nose normal.   Eyes: Conjunctiva normal, No discharge. Neck: Normal range of motion, No tenderness, Supple, No stridor. Cardiovascular: Sternotomy scar, pacemaker noted. Normal heart rate, Normal rhythm, No murmurs, No rubs, No gallops. Thorax & Lungs: Normal breath sounds, No respiratory distress, No wheezing, No chest tenderness. Abdomen: normal bowel sounds, soft, non tender, non distended, scars consistent with stated surgeries, no hernias  Rectal:  Deferred. Skin: Warm, Dry, No erythema, No rash. No bruising. No spider hemangiomas. Back: No tenderness, No CVA tenderness. Lower Extremities: Intact distal pulses, No edema, No tenderness, No cyanosis, No clubbing. Neurologic: Alert & awake but drowsy.       RADIOLOGY/PROCEDURES     COURSE & MEDICAL DECISION MAKING   (See epic chart for additional details including stool tests, total bilirubin, viral loads, procedures, and pathology)  Lab Results   Component Value Date    WBC 8.8 02/24/2021    HGB 6.0 (LL) 02/24/2021    HCT 18.6 (LL) 02/24/2021     02/24/2021    ALT 29 02/24/2021    AST 44 (H) 02/24/2021     02/24/2021    K 6.0 (HH) 02/24/2021     02/24/2021    CREATININE 4.1 (H) 02/24/2021    BUN 69 (H) 02/24/2021    CO2 19 (L) 02/24/2021    INR 1.09 02/24/2021     No results found for: BILIDIR  No results found for: PTH, CAION, PHOS  Lab Results   Component Value Date    LABALBU 3.7 02/24/2021    ALKPHOS 57 02/24/2021    ALT 29 02/24/2021 AST 44 02/24/2021    BILITOT 0.4 02/24/2021     No results found for: LIPASE  No results found for: AMYLASE  Lab Results   Component Value Date    INR 1.09 02/24/2021    PROTIME 12.6 02/24/2021     . FINAL IMPRESSION/ASSESSMENT/PLAN       1. Acute anemia with melena. Differential diagnosis includes but not limited to esophagitis, peptic ulcer disease, medication induced ulceration, gastritis, neoplasia, arterio-venous malformations, Dieulafoy lesion. Plan:   Hemoglobin and hematocrit remained stable at present. No evidence of overt acute GI bleeding. Keep NPO with IV fluids. Monitor and transfuse to keep Hgb > 7 g/dl; Continue Protonix 40 mg IV BID  Monitor PT/INR and correct coagulopathy  Plan for EGD with bleeding control tomorrow 2/25  Notify GI if overt GI bleeding with hemodynamic instability for urgent endoscopy. Esophagogastroduodenoscopy (EGD) with alternatives, rationale, benefits and risks, not limited to bleeding, infection, perforation, need of surgery, prolong hospital stay and anesthesia side effects were explained to patient's daughter Tiana Donato at [phone]. She verbalized understanding and agrees to proceed with EGD. 1.  The patient indicates understanding of these issues and agrees with the plan. 2.  I reviewed the patient's medical information and medical history. 3.  I have reviewed the past medical, family, and social history sections including the medications and allergies listed in the above medical record. Thank you for involving [facility]) Gastroenterology in [facility]. For further questions or concerns, we can best be reached through perfect serv.         Kristopher Azulp 2/24/21 12:14 PM EST    [facility]   Phone [phone]   Fax [phone]

## 2021-02-24 NOTE — H&P
Hospital Medicine History & Physical      PCP: Krysta Carr MD    Date of Admission: 2/24/2021    Date of Service: Pt seen/examined on 2/24/2021 and Admitted to Inpatient with expected LOS greater than two midnights due to medical therapy. Chief Complaint: Lethargy, melena      History Of Present Illness:     Deirdre Ohara 80 y.o. male who presented to [facility] with melena and lethargy. Of note patient was admitted to an outside hospital in [facility] and discharged on 2/17 for pneumonia. During his stay he had a traumatic Mancuso placement with 3 resultant hematuria requiring an extended stay. His Eliquis was stopped at this time and has not been continued since then. He has 3 more days of oral antibiotics bleed is course of treatment for his pneumonia. His daughter moved him to nursing home to be closer to family at the time of discharge. Was up walking around and very active. However on the last couple days he has been very lethargic laying in bed with multiple dark bloody stools. As by his outpatient physician who did outpatient labs and noted a hemoglobin of 5.5. He was told to come to the emergency room for further evaluation. Exam patient is very lethargic he will open his eyes for a brief moment and answer 1 or 2 questions but quickly falls back to sleep. He was taken from his daughter. Daughter said that the patient started bleeding Monday prior to his second dose of Moderna vaccine. According to report his hemoglobin was 7.9 at time of discharge from [facility] on 2/17. In the emergency room he was noted to have a hemoglobin of 6.0 with some mild hypotension. 2 units of packed red blood cells were initiated in the emergency room. GI was consulted and patient started on PPI drip. He was also initiated on treatment for hyperkalemia. He typically takes Veltassa for hyperkalemia but has been off of this for the past week while he is on antibiotics. Past Medical History:      A flutter  CAD status post CABG  Hypertension  CKD stage IV  Status post PPM  CHF    Past Surgical History:      CABG  Medications Prior to Admission:      Prior to Admission medications    Medication Sig Start Date End Date Taking? Authorizing Provider   ipratropium-albuterol (DUONEB) 0.5-2.5 (3) MG/3ML SOLN nebulizer solution Inhale 1 vial into the lungs every 4 hours   Yes Historical Provider, MD   isosorbide mononitrate (IMDUR) 30 MG extended release tablet Take 30 mg by mouth daily   Yes Historical Provider, MD   levothyroxine (SYNTHROID) 88 MCG tablet Take 88 mcg by mouth Daily   Yes Historical Provider, MD   metoprolol succinate (TOPROL XL) 25 MG extended release tablet Take 25 mg by mouth daily   Yes Historical Provider, MD   omeprazole (PRILOSEC) 20 MG delayed release capsule Take 40 mg by mouth daily   Yes Historical Provider, MD   patiromer sorbitex calcium (VELTASSA) 8.4 g PACK packet Take 8.4 g by mouth daily   Yes Historical Provider, MD   coenzyme Q-10 100 MG capsule Take 100 mg by mouth daily   Yes Historical Provider, MD   Flaxseed, Linseed, (FLAXSEED OIL) 1000 MG CAPS Take by mouth   Yes Historical Provider, MD   fluticasone (FLONASE) 50 MCG/ACT nasal spray 1 spray by Each Nostril route daily   Yes Historical Provider, MD   folic acid (FOLVITE) 1 MG tablet Take 1 mg by mouth daily   Yes Historical Provider, MD   dextromethorphan-guaiFENesin (MUCINEX DM)  MG per extended release tablet Take 1 tablet by mouth every 12 hours as needed   Yes Historical Provider, MD   Ascorbic Acid (VITAMIN C) 250 MG tablet Take 500 mg by mouth daily   Yes Historical Provider, MD       Allergies:  Albuterol -tachycardia    Social History:      TOBACCO:   has no history on file for tobacco.  ETOH:   has no history on file for alcohol.   E-Cigarettes/Vaping Use     Questions Responses    E-Cigarette/Vaping Use     Start Date     Passive Exposure     Quit Date     Counseling Given Comments             Family History:        History reviewed. No pertinent family history. REVIEW OF SYSTEMS:   Pertinent positives as noted in the HPI. All other systems reviewed and negative. PHYSICAL EXAM PERFORMED:    BP (!) 93/47   Pulse 67   Temp 97.5 °F (36.4 °C) (Axillary)   Resp 17   Ht 5' 5\" (1.651 m)   Wt 150 lb (68 kg)   SpO2 100%   BMI 24.96 kg/m²     General appearance:  No apparent distress, lethargic, pale  HEENT:  atraumatic without obvious deformity  Neck: Supple, with full range of motion. No jugular venous distention. Trachea midline. Respiratory:  Normal respiratory effort. Clear to auscultation, bilaterally without Rales/Wheezes/Rhonchi. Cardiovascular:  Regular rate and rhythm  Abdomen: Soft, non-tender, non-distended   Musculoskeletal:  No edema bilaterally  Neurologic: Lethargic, no focal deficits  Psychiatric:  n/a  Capillary Refill: + 2 seconds   Peripheral Pulses: +2 palpable, equal bilaterally       Labs:     Recent Labs     02/24/21  0932   WBC 8.8   HGB 6.0*   HCT 18.6*        Recent Labs     02/24/21  0932      K 6.0*      CO2 19*   BUN 69*   CREATININE 4.1*   CALCIUM 10.4     Recent Labs     02/24/21  0932   AST 44*   ALT 29   BILITOT 0.4   ALKPHOS 57     Recent Labs     02/24/21  0932   INR 1.09     Recent Labs     02/24/21  0932   TROPONINI 0.14*       Urinalysis:    No results found for: Olive Meliton, BACTERIA, RBCUA, BLOODU, SPECGRAV, GLUCOSEU    Radiology:     XR CHEST PORTABLE   Final Result   Patchy nodular opacities are noted in the right mid and lower lung which may   reflect airspace disease in the correct clinical setting. Malignancy cannot   be excluded. Further evaluation with chest CT is recommended. ASSESSMENT:    There are no active hospital problems to display for this patient.         PLAN:  Acute GI bleed  - melena x 3 days  - GI consulted  - started on ppi gtt  - blood transfusion Acute blood loss anemia, hypotension  - Hb 5.5 on OP labs, 6.0 on admission  - receiving 2 unit prbc in ED  - post-transfusion Hb ordered  - monitor H/H     Lactic acidosis  - blood transfusions  - IVF  - trend     ANGEL on CKD  - nephrology consulted  - creatinine at baseline 2.9, creatinine on admission 4.1  - IVF per nephrology     Hyperkalemia  - s/p insulin, albuterol, calcium gluconate in the ED  - repeat level  - nephrology c/s    Hypothyroidism  - TSH 40  - decrease dose, give IV until more alert    Hx A flutter  - hold bb  - was on Eliuis - has been off since last hospitalization 2/2 tramatic hematura    Hx recent Tramatic hematuria  - family refuses further jose placement    CAD s/p CABG, HTN, s/p PPM  - hold bb, imdur for hypotension    Elevated troponin  - in setting of GIB  - trend troponin  - EKG - ventricular paced rhythm  - cardiology c/s    Recent PNA  - will confirm abx patient on as OP and resume abx    Former smoker, uses nebs daily, no formal dx of COPD  - c/w itraprotrium nebs    DVT Prophylaxis: scds, no ac for GIB  Diet: NPO  Code Status: Limited - no intubation, family wants every thing else done for him. Dispo - admit to [facility] MD    Thank you Frank Ronquillo MD for the opportunity to be involved in this patient's care. If you have any questions or concerns please feel free to contact me.

## 2021-02-25 NOTE — ANESTHESIA POSTPROCEDURE EVALUATION
Department of Anesthesiology  Postprocedure Note    Patient: Mouna Marques  MRN: [id number]  YOB: 1925  Date of evaluation: 2/25/2021  Time:  11:35 AM     Procedure Summary     Date: 02/25/21 Room / Location: [address]    Anesthesia Start: 0730 Anesthesia Stop: 1153    Procedure: EGD DIAGNOSTIC ONLY (N/A ) Diagnosis: (GI BLEED)    Surgeons: Shyann Koenig MD Responsible Provider: Chloé Henson MD    Anesthesia Type: general ASA Status: 3 - Emergent          Anesthesia Type: general    Evelio Phase I:      Evelio Phase II:      Last vitals: Reviewed and per EMR flowsheets.        Anesthesia Post Evaluation    Comments: Postoperative Anesthesia Note    Name:    Mouna Marques  MRN:      [id number]    Patient Vitals in the past 12 hrs:  02/25/21 1030, Resp:26, SpO2:94 %  02/25/21 0818, BP:(!) 96/57, Temp:97.5 °F (36.4 °C), Temp src:Axillary, Pulse:62, Resp:17, SpO2:96 %  02/25/21 0608, Weight:130 lb 1.1 oz (59 kg)  02/25/21 0600, BP:(!) 91/56, Pulse:60, Resp:16, SpO2:100 %  02/25/21 0500, BP:(!) 102/42, Pulse:60, Resp:18, SpO2:95 %  02/25/21 0400, BP:(!) 102/44, Temp:97.6 °F (36.4 °C), Temp src:Axillary, Pulse:62, Resp:15, SpO2:98 %  02/25/21 0300, BP:(!) 111/57, Pulse:60, Resp:13, SpO2:97 %  02/25/21 0200, BP:99/66, Pulse:60, Resp:14, SpO2:99 %  02/25/21 0100, BP:(!) 96/50, Pulse:60, Resp:15  02/25/21 0000, BP:98/60, Temp:97.6 °F (36.4 °C), Temp src:Axillary, Pulse:65, Resp:21, SpO2:97 %     LABS:    CBC  Lab Results       Component                Value               Date/Time                  WBC                      8.4                 02/25/2021 04:46 AM        HGB                      8.2 (L)             02/25/2021 04:46 AM        HCT                      24.3 (L)            02/25/2021 04:46 AM        PLT                      203                 02/25/2021 04:46 AM   RENAL  Lab Results       Component                Value               Date/Time NA                       139                 02/25/2021 04:46 AM        K                        5.2 (H)             02/25/2021 04:46 AM        CL                       105                 02/25/2021 04:46 AM        CO2                      24                  02/25/2021 04:46 AM        BUN                      63 (H)              02/25/2021 04:46 AM        CREATININE               4.0 (H)             02/25/2021 04:46 AM        GLUCOSE                  136 (H)             02/25/2021 04:46 AM   COAGS  Lab Results       Component                Value               Date/Time                  PROTIME                  12.6                02/24/2021 09:32 AM        INR                      1.09                02/24/2021 09:32 AM        APTT                     30.1                02/24/2021 09:32 AM     Intake & Output:  @91WARC@    Nausea & Vomiting:  No    Level of Consciousness:  Awake    Pain Assessment:  Adequate analgesia    Anesthesia Complications:  No apparent anesthetic complications    SUMMARY      Vital signs stable  OK to discharge from Stage I post anesthesia care.   Care transferred from Anesthesiology department on discharge from perioperative area

## 2021-02-25 NOTE — PROGRESS NOTES
2014: Pt has not voided since admission to floor. Per dayshift, they provided multiple attempts in assisting pt to void on his own with use of urinal. Pt visibly grimacing, fidgety, restless. RN bladder scanned pt total urine in bladder 1186ml. RN assisted pt to use urinal in bed and with x2 assist got pt to bedside commode. Pt up on [facility] for 15 minutes with no success in voiding. Per family, a few weeks ago pt had traumatic insertion of jose catheter at [facility] He had been previously on eliquis, which he has been off of since that admission. Per family, st. Hay[facility] Dill was able to d/c jose and d/c pt from hospital with no known active issue with voiding on his own, until today 2/24. Per family and patient, they deny any knowledge of known prostate issues/history. Secure message made to Dr. Marrion Buerger with the above. Orders received to place jose catheter. Daughter, Stephania Dubon at bedside, consents to jose placement. Attempt by RN unsuccessful in placing jose. Resistance met before bladder area. Family edu regarding unsuccessful attempt of jose. Verbalizes understanding. RN offers coude jose. Family is refusing second attempt of jose placement by staff. Per family, jose insertion may only be done by a urology doctor. 2133: RN notified Dr. Marrion Buerger of the above. Orders received for urology consult. 2148: Call received back from Dr. Deidra Burch, from urology. Updated with pt history, admission, and reason for consult. Per MD, he will be in house in the next hour. Daughter Stephania Dubon updated. 2215: Daughter, Stephania Dubon, escorted to ED entrance by writer at this time r/t visitor restriction during [place] 19 pandemic. Daughter states she will return in AM in ordnance to visitation hours 9am-6pm.      2300: Urology cart and MD at bedside. 18 [place] coude catheter placed. Per MD resistance was met during placement. 1300ml bloody urine return noted. Verbal orders received from MD, to flush catheter PRN if urine OP decreases/concerned for catheter clogging. 2302: Secure made to Dr. Shyann Jeter. Pt in discomfort, states 9/10 pain in penial/ lower abd area r/t recent catheter placement. See MAR for orders for PRN Morphine received. 2318: Call made to pt's daughter, Dionicia Harada. Updated with successful jose placement by MD. Per Dionicia Harada, she will update pt's other daughter, Ruchi Camargo. All questions answered, denies any further needs at this time.

## 2021-02-25 NOTE — PROGRESS NOTES
Hospitalist Progress Note      PCP: Dread Modi MD    Date of Admission: 2/24/2021      Hospital Course: admitted for GIB    Subjective:    Patient seen and examined. Seen post EGD, sleeping in bed, wakes easily and answers a few questions then back to sleep. No pain, mild dizziness. Medications:  Reviewed    Infusion Medications    sodium chloride      sodium chloride      sodium chloride       Scheduled Medications    mupirocin   Nasal BID    pantoprazole  40 mg Oral QAM AC    sodium chloride flush  10 mL Intravenous 2 times per day    levothyroxine  37.5 mcg Intravenous Daily    fluticasone  1 spray Each Nostril Daily    ipratropium  0.5 mg Nebulization TID    metoprolol succinate  25 mg Oral Daily    atorvastatin  40 mg Oral Nightly    doxycycline (VIBRAMYCIN) IV  100 mg Intravenous Q12H    cefTRIAXone (ROCEPHIN) IV  1,000 mg Intravenous Q24H     PRN Meds: sodium chloride, sodium chloride, sodium chloride flush, promethazine **OR** ondansetron, acetaminophen **OR** acetaminophen, perflutren lipid microspheres, morphine      Intake/Output Summary (Last 24 hours) at 2/25/2021 1109  Last data filed at 2/25/2021 0751  Gross per 24 hour   Intake 3386.25 ml   Output 1760 ml   Net 1626.25 ml       Physical Exam Performed:    BP (!) 96/57   Pulse 62   Temp 97.5 °F (36.4 °C) (Axillary)   Resp 26   Ht 5' 5\" (1.651 m)   Wt 130 lb 1.1 oz (59 kg)   SpO2 94%   BMI 21.64 kg/m²       General appearance: No apparent distress,, cooperative, wakes easily  Respiratory:  Normal respiratory effort. Clear to auscultation, bilaterally without Rales/Wheezes/Rhonchi. Cardiovascular: Regular rate and rhythm  Abdomen: Soft, non-tender, non-distended with normal bowel sounds.   Musculoskeletal: No edema bilaterally  Neurologic:  No new focal deficits  Peripheral Pulses: +2 palpable, equal bilaterally       Labs:   Recent Labs     02/24/21  0932 02/24/21  1706 02/25/21  0135 02/25/21  0446 WBC 8.8  --   --  8.4   HGB 6.0* 9.6* 7.8* 8.2*   HCT 18.6* 29.3* 23.2* 24.3*     --   --  203     Recent Labs     02/24/21  0932 02/24/21  1706 02/25/21  0446    138 139   K 6.0* 5.3* 5.2*    104 105   CO2 19* 20* 24   BUN 69* 67* 63*   CREATININE 4.1* 4.2* 4.0*   CALCIUM 10.4 10.8* 9.3     Recent Labs     02/24/21  0932   AST 44*   ALT 29   BILITOT 0.4   ALKPHOS 57     Recent Labs     02/24/21  0932   INR 1.09     Recent Labs     02/24/21  0932 02/25/21  0446   TROPONINI 0.14* 0.28*       Urinalysis:      Lab Results   Component Value Date    NITRU POSITIVE 02/25/2021    WBCUA see below 02/25/2021    BACTERIA Rare 02/25/2021    RBCUA >100 02/25/2021    BLOODU LARGE 02/25/2021    SPECGRAV 1.020 02/25/2021    GLUCOSEU Negative 02/25/2021       Radiology:  XR CHEST PORTABLE   Final Result   Patchy nodular opacities are noted in the right mid and lower lung which may   reflect airspace disease in the correct clinical setting. Malignancy cannot   be excluded. Further evaluation with chest CT is recommended.          US RENAL COMPLETE    (Results Pending)           Assessment/Plan:    Active Hospital Problems    Diagnosis    Multiple gastric ulcers [K25.9]    Duodenal bulb ulcer [K26.9]    Gastrointestinal hemorrhage [K92.2]     Acute GI bleed  - GI consulted   - EGD toay -normal esophagus, few nonbleeding ulcers in the antrum, mild gastritis, official ulcer in the duodenal sweep likely etiology of GIB no active bleeding seen  - on ppi gtt - switched to po BID  - Per GI ok to resume Eliquis, will start at discharge     Acute blood loss anemia  - Hb 6.0 on admission  - s/p 2 unit prbc in ED  - h/h stable     Lactic acidosis  - resolved    Urinary retention, probable BPH  - urology c/s - jose placed     ANGEL on CKD  - nephrology consulted  - creatinine at baseline 2.9, creatinine on admission 4.1  - IVF per nephrology      Hyperkalemia  - s/p insulin, albuterol, calcium gluconate in the ED - nephrology c/s  - improving      Hypothyroidism  - TSH 40  - decrease dose, give IV until more alert     Hx A flutter  - bb  - was on Eliuis - has been off since last hospitalization 2/2 tramatic hematura     Hx recent Tramatic hematuria  - family refuses further jose placement     CAD s/p CABG, HTN, s/p PPM  - bb resumed     Elevated troponin  - in setting of GIB  - cardiology c/s - conservative management  - ECHO ordered  - bb, statin, no asa given GIB     Recent PNA  - on OP abx through 2/26  - on IV abx - resume oral abx in AM if tolerating po intake     Former smoker, uses nebs daily, no formal dx of COPD  - c/w itraprotrium nebs       DVT Prophylaxis: SCDs  Diet: DIET CLEAR LIQUID;  Code Status: Limited      Dispo - okay to transfer out of ICU    La Watson MD

## 2021-02-25 NOTE — PROGRESS NOTES
[address]   Progress Note  Cardiology    CC: GIB    HPI: feels ok. No cp/sob    Past Medical History   has no past medical history on file. Past Surgical History   has no past surgical history on file. Social History       Family History  family history is not on file. Medications  Prior to Admission medications    Medication Sig Start Date End Date Taking? Authorizing Provider   ipratropium-albuterol (DUONEB) 0.5-2.5 (3) MG/3ML SOLN nebulizer solution Inhale 1 vial into the lungs every 4 hours   Yes Historical Provider, MD   isosorbide mononitrate (IMDUR) 30 MG extended release tablet Take 30 mg by mouth daily   Yes Historical Provider, MD   levothyroxine (SYNTHROID) 88 MCG tablet Take 88 mcg by mouth Daily   Yes Historical Provider, MD   metoprolol succinate (TOPROL XL) 25 MG extended release tablet Take 25 mg by mouth daily   Yes Historical Provider, MD   omeprazole (PRILOSEC) 20 MG delayed release capsule Take 40 mg by mouth daily   Yes Historical Provider, MD   patiromer sorbitex calcium (VELTASSA) 8.4 g PACK packet Take 8.4 g by mouth daily   Yes Historical Provider, MD   coenzyme Q-10 100 MG capsule Take 100 mg by mouth daily   Yes Historical Provider, MD   Flaxseed, Linseed, (FLAXSEED OIL) 1000 MG CAPS Take by mouth   Yes Historical Provider, MD   fluticasone (FLONASE) 50 MCG/ACT nasal spray 1 spray by Each Nostril route daily   Yes Historical Provider, MD   folic acid (FOLVITE) 1 MG tablet Take 1 mg by mouth daily   Yes Historical Provider, MD   dextromethorphan-guaiFENesin (MUCINEX DM)  MG per extended release tablet Take 1 tablet by mouth every 12 hours as needed   Yes Historical Provider, MD   Ascorbic Acid (VITAMIN C) 250 MG tablet Take 500 mg by mouth daily   Yes Historical Provider, MD       Allergies  Albuterol    Review of Systems:   Reviewed.  No changes except as noted in HPI and A/P      Lab Results   Component Value Date    WBC 8.4 02/25/2021    HGB 8.2 (L) 02/25/2021

## 2021-02-25 NOTE — PROGRESS NOTES
Nephrology Progress  Note                                                                                                                                                                                                                                                                                                                                                               Office : [phone]     Fax :[phone]              Patient's Name: Stacey Pelayo  12:11 PM  2/25/2021    Reason for Consult: ANGEL    Requesting Physician:  Cruz Cobos MD      Chief Complaint:  Anemia, GI bleed     History of Present Ilness:    Stacey Pelayo is a 80 y.o. male with PMH of PNA  ( recent hospitalization for PNA ) and was discharged from hospital on 2/18  H/o CKD stage 4 , was on Patiromer ( K binder )  , recently has moved from [place] to [facility], was brought in with c/o  GI bleeding    Onset last 2 days    Outside lab hb was < 6    Here in ED Hb  6, s/p 2 units of PRBC transfusion in ED    No diarrhea         Interval history :      Serum cr touch better     Hyperkalemia improved    S/p EGD     Daughter at bedside    History reviewed. No pertinent past medical history. History reviewed. No pertinent surgical history. History reviewed. No pertinent family history.          Allergies:  Albuterol    Current Medications:        mupirocin (BACTROBAN) 2 % ointment, BID      pantoprazole (PROTONIX) tablet 40 mg, QAM AC      0.9 % sodium chloride infusion, Continuous      0.9 % sodium chloride infusion, PRN      0.9 % sodium chloride infusion, PRN      sodium chloride flush 0.9 % injection 10 mL, 2 times per day      sodium chloride flush 0.9 % injection 10 mL, PRN      promethazine (PHENERGAN) tablet 12.5 mg, Q6H PRN    Or      ondansetron (ZOFRAN) injection 4 mg, Q6H PRN      acetaminophen (TYLENOL) tablet 650 mg, Q6H PRN    Or      acetaminophen (TYLENOL) suppository 650 mg, Q6H PRN     levothyroxine (SYNTHROID) injection 37.5 mcg, Daily      fluticasone (FLONASE) 50 MCG/ACT nasal spray 1 spray, Daily      ipratropium (ATROVENT) 0.02 % nebulizer solution 0.5 mg, TID      metoprolol succinate (TOPROL XL) extended release tablet 25 mg, Daily      atorvastatin (LIPITOR) tablet 40 mg, Nightly      perflutren lipid microspheres (DEFINITY) injection 1.65 mg, ONCE PRN      doxycycline (VIBRAMYCIN) 100 mg in dextrose 5 % 100 mL IVPB, Q12H      cefTRIAXone (ROCEPHIN) 1000 mg IVPB in 50 mL D5W minibag, Q24H      morphine (PF) injection 2 mg, Q3H PRN        Review of Systems:   14 point ROS obtained but were negative except mentioned in HPI      Physical exam:     Vitals:  BP (!) 123/50   Pulse 62   Temp 97.5 °F (36.4 °C) (Axillary)   Resp 9   Ht 5' 5\" (1.651 m)   Wt 130 lb 1.1 oz (59 kg)   SpO2 93%   BMI 21.64 kg/m²   Constitutional:  OAA X3 NAD  Skin: no rash, turgor wnl  Heent:  eomi, mmm  Neck: no bruits or jvd noted  Cardiovascular:  S1, S2 without m/r/g  Respiratory: CTA B without w/r/r  Abdomen:  +bs, soft, nt, nd  Ext: no lower extremity edema  Psychiatric: mood and affect appropriate  Musculoskeletal:  Rom, muscular strength intact    Data:   Labs:  CBC:   Recent Labs     02/24/21  0932 02/24/21  1706 02/25/21  0135 02/25/21  0446   WBC 8.8  --   --  8.4   HGB 6.0* 9.6* 7.8* 8.2*     --   --  203     BMP:    Recent Labs     02/24/21  0932 02/24/21  1706 02/25/21  0446    138 139   K 6.0* 5.3* 5.2*    104 105   CO2 19* 20* 24   BUN 69* 67* 63*   CREATININE 4.1* 4.2* 4.0*   GLUCOSE 136* 107* 136*     Ca/Mg/Phos:   Recent Labs     02/24/21  0932 02/24/21  1706 02/25/21  0446   CALCIUM 10.4 10.8* 9.3     Hepatic:   Recent Labs     02/24/21  0932   AST 44*   ALT 29   BILITOT 0.4   ALKPHOS 57     Troponin:   Recent Labs     02/24/21  0932 02/25/21  0446   TROPONINI 0.14* 0.28*     BNP: No results for input(s): BNP in the last 72 hours.   Lipids: No results for input(s): CHOL, TRIG, HDL, LDLCALC, LABVLDL in the last 72 hours. ABGs: No results for input(s): PHART, PO2ART, VAG3OOL in the last 72 hours. INR:   Recent Labs     02/24/21  0932   INR 1.09     UA:  Recent Labs     02/25/21 0148   COLORU RED*   CLARITYU TURBID*   GLUCOSEU Negative   BILIRUBINUR Negative   KETUA TRACE*   SPECGRAV 1.020   BLOODU LARGE*   PHUR 5.0   PROTEINU >=300*   UROBILINOGEN 1.0   NITRU POSITIVE*   LEUKOCYTESUR SMALL*   LABMICR YES   URINETYPE NotGiven      Urine Microscopic:   Recent Labs     02/25/21 0148   BACTERIA Rare*   WBCUA see below*   RBCUA >100*     Urine Culture: No results for input(s): LABURIN in the last 72 hours. Urine Chemistry:   Recent Labs     02/25/21 0148   LABCREA 61.0   NAUR 70             IMAGING:  XR CHEST PORTABLE   Final Result   Patchy nodular opacities are noted in the right mid and lower lung which may   reflect airspace disease in the correct clinical setting. Malignancy cannot   be excluded. Further evaluation with chest CT is recommended. US RENAL COMPLETE    (Results Pending)       Assessment/Plan       1.  Acute renal failure on CKD stage 4      Baseline serum cr as per chart review ~ 2.9       Serum cr on admission 4.1      Etiology seems to be combination of pre renal  renal hypoperfusion due to GI bleed, intravascular   volume depletion       ATN component due to hypotension       Plan       DC bicarb IV fluid       Start IVF NS @ 75 ml/hr        Avoid volume overload          Renal US      Avoid contrast    Keep MAP > 65 mmhg    Medication dose as per GFR    Avoid ACEI or ARB      2 Hyperkalemia in setting of ANGEL and acidosis  : improved      Got temporizing measure in ED : ca gluconate + dextrose + insulin          3 Non anion gap metabolic acidosis : improved                4. GI bleed   PPI   transfuse prn     S/p EGD    5 Anemia due to GI bleed                   Thank you for allowing us to participate in care of [employer] Steven Porter free to contact me   [facility] [address]  Office : [phone]  Fax :[phone]

## 2021-02-25 NOTE — OP NOTE
[address] ,  [address] [facility]  Phone: [phone] [facility] [address],  [address] [address], [address] [place] [address]  Phone: [phone]   NWW:[phone]    EGD Procedure Note    Patient: Jac Lake  : 1925    Procedure: Esophagogastroduodenoscopy    Date:  2021     Endoscopist:  Ana Benedict MD    Referring Physician:  Harmeet Israel MD    Preoperative Diagnosis:  Acute GI bleeding [K92.2]    Anesthesia: Anesthesia: MAC  Sedation: Propofol per anesthesia  Start Time: 6301  Stop Time: 1343  ASA Class: 3  Mallampati: II (soft palate, uvula, fauces visible)    Indications:  This is a 80y.o. year old male with medical history of hypothyroidism, coronary artery disease status post CABG and pacemaker, atrial flutter on Eliquis, CKD stage IV, hypertension presents melena and Hb 6 g/dL    Procedure Details Informed consent was obtained for the procedure, including conscious sedation. Risks of pancreatitis, infection, perforation, hemorrhage, adverse drug reaction and aspiration were discussed. The patient was placed in the left lateral decubitus position. Based on the pre-procedure assessment, including review of the patient's medical history, medications, allergies, and review of systems, he had been deemed to be an appropriate candidate for conscious sedation; he was therefore sedated with the medications listed above. He was monitored continuously with ECG tracing, pulse oximetry, blood pressure monitoring, and direct observation. A gastroscope was inserted into the mouth and advanced under direct vision to third portion of the duodenum. A careful inspection was made as the gastroscope was withdrawn, including a retroflexed view of the proximal stomach including views of the incisura and cardia; findings and interventions are described below. Appropriate photodocumentation Was Obtained. If photos taken, they were ordered to be scanned into the medical record. Findings:  Normal entire esophagus  Regular Z-line at 40 cm from incisors  A few 3-5 mm superficial clean based nonbleeding ulcers in antrum. Not biopsied. Mild erythematous mucosa in antrum and body of stomach suggestive of mild gastritis. Not biopsied. A 1 cm clean based superficial ulcer with deformity in the duodenal sweep. There was no stigmata of bleed or active bleeding. Normal second and third portions of duodenum with bile. Specimens: Was Not Obtained    Complications:   None; patient tolerated the procedure well. Disposition:   ICU - extubated and stable. Estimated Blood loss:  none    Impression:   Normal entire esophagus  Regular Z-line at 40 cm from incisors  A few 3-5 mm superficial clean based nonbleeding ulcers in antrum. Not biopsied. Mild gastritis. Not biopsied.

## 2021-02-25 NOTE — PROGRESS NOTES
Call received from pt's daughter, Lauren Blackwell. Updated on current pt status, current vitals, most recent labs. Lauren Blackwell requested what pain meds were given post jose insertion; morphine IV was given per [facility]. Per Lauren Blackwell, morphine is NOT to be given again per her request and to give IV dilaudid instead if needed. Per Lauren Blackwell, morphine was given on last admission and made pt drowsy and altered. Writer explained pt has been sleeping since administration of medication but awakens easily with stimuli and verbalizes needs. Lauren Blackwell also asked what time EGD is planned for today. RN is not aware of specific time but will notify either pt's daughter if news of EGD time becomes available. Lauren Blackwell denies any further needs or questions at this time. Will pass the above along to day shift.

## 2021-02-25 NOTE — ANESTHESIA PRE PROCEDURE
 mupirocin (BACTROBAN) 2 % ointment   Nasal BID Magali Jeffers MD        pantoprazole (PROTONIX) 80 mg in sodium chloride 0.9 % 100 mL infusion  8 mg/hr Intravenous Continuous Magali Jeffers MD 10 mL/hr at 02/25/21 0559 8 mg/hr at 02/25/21 0559    0.9 % sodium chloride infusion   Intravenous PRN NAOMI Morgan        0.9 % sodium chloride infusion   Intravenous PRN NAOMI Frederick        sodium chloride flush 0.9 % injection 10 mL  10 mL Intravenous 2 times per day Magali Jeffers MD   10 mL at 02/24/21 2200    sodium chloride flush 0.9 % injection 10 mL  10 mL Intravenous PRN Magali Jeffers MD        promethazine (PHENERGAN) tablet 12.5 mg  12.5 mg Oral Q6H PRN Magali Jeffers MD        Or    ondansetron [facility]) injection 4 mg  4 mg Intravenous Q6H PRN Magali Jeffers MD        acetaminophen (TYLENOL) tablet 650 mg  650 mg Oral Q6H PRN Magali Jeffers MD        Or   Gleda Hermanns acetaminophen (TYLENOL) suppository 650 mg  650 mg Rectal Q6H PRN Magali Jeffers MD        levothyroxine (SYNTHROID) injection 37.5 mcg  37.5 mcg Intravenous Daily Magali Jeffers MD   37.5 mcg at 02/24/21 1858    fluticasone (FLONASE) 50 MCG/ACT nasal spray 1 spray  1 spray Each Nostril Daily Mgaali Jeffers MD   1 spray at 02/24/21 1914    ipratropium (ATROVENT) 0.02 % nebulizer solution 0.5 mg  0.5 mg Nebulization TID Magali Jeffers MD   0.5 mg at 02/24/21 2029    sodium bicarbonate 150 mEq in dextrose 5 % 1,000 mL infusion   Intravenous Continuous Yury Salinas  mL/hr at 02/25/21 0356 New Bag at 02/25/21 0356    metoprolol succinate (TOPROL XL) extended release tablet 25 mg  25 mg Oral Daily Jessi Morris MD   25 mg at 02/24/21 1914    atorvastatin (LIPITOR) tablet 40 mg  40 mg Oral Nightly Jessi Morris MD        perflutren lipid microspheres (DEFINITY) injection 1.65 mg  1.5 mL Intravenous ONCE PRN Jessi Morris MD  doxycycline (VIBRAMYCIN) 100 mg in dextrose 5 % 100 mL IVPB  100 mg Intravenous Q12H Lizzie Marquez  mL/hr at 02/25/21 0559 100 mg at 02/25/21 0559    cefTRIAXone (ROCEPHIN) 1000 mg IVPB in 50 mL D5W minibag  1,000 mg Intravenous Q24H Lizzie Marquez MD   Stopped at 02/24/21 1935    morphine (PF) injection 2 mg  2 mg Intravenous Q3H PRN Iesha Pimentel MD   2 mg at 02/24/21 3754       Allergies: Allergies   Allergen Reactions    Albuterol        Problem List:    Patient Active Problem List   Diagnosis Code    Gastrointestinal hemorrhage K92.2       Past Medical History:  History reviewed. No pertinent past medical history. Past Surgical History:  History reviewed. No pertinent surgical history. Social History:    Social History     Tobacco Use    Smoking status: Not on file   Substance Use Topics    Alcohol use: Not on file                                Counseling given: Not Answered      Vital Signs (Current):   Vitals:    02/25/21 0400 02/25/21 0500 02/25/21 0600 02/25/21 0608   BP: (!) 102/44 (!) 102/42 (!) 91/56    Pulse: 62 60 60    Resp: 15 18 16    Temp: 97.6 °F (36.4 °C)      TempSrc: Axillary      SpO2: 98% 95% 100%    Weight:    130 lb 1.1 oz (59 kg)   Height:                                                  BP Readings from Last 3 Encounters:   02/25/21 (!) 91/56       NPO Status: Time of last liquid consumption: 2300                        Time of last solid consumption: 2300                        Date of last liquid consumption: 02/24/21                        Date of last solid food consumption: 02/24/21    BMI:   Wt Readings from Last 3 Encounters:   02/25/21 130 lb 1.1 oz (59 kg)     Body mass index is 21.64 kg/m².     CBC:   Lab Results   Component Value Date    WBC 8.4 02/25/2021    RBC 2.59 02/25/2021    HGB 8.2 02/25/2021    HCT 24.3 02/25/2021    MCV 93.8 02/25/2021    RDW 21.3 02/25/2021     02/25/2021       CMP:   Lab Results   Component Value Date 02/25/21 0400 02/25/21 0500 02/25/21 0600 02/25/21 0608   BP: (!) 102/44 (!) 102/42 (!) 91/56    Pulse: 62 60 60    Resp: 15 18 16    Temp: 97.6 °F (36.4 °C)      TempSrc: Axillary      SpO2: 98% 95% 100%    Weight:    130 lb 1.1 oz (59 kg)   Height:           Allergies   Allergen Reactions    Albuterol        Social History     Tobacco Use    Smoking status: Not on file   Substance Use Topics    Alcohol use: Not on file       LABS:    CBC  Lab Results   Component Value Date/Time    WBC 8.4 02/25/2021 04:46 AM    HGB 8.2 (L) 02/25/2021 04:46 AM    HCT 24.3 (L) 02/25/2021 04:46 AM     02/25/2021 04:46 AM     RENAL  Lab Results   Component Value Date/Time     02/25/2021 04:46 AM    K 5.2 (H) 02/25/2021 04:46 AM     02/25/2021 04:46 AM    CO2 24 02/25/2021 04:46 AM    BUN 63 (H) 02/25/2021 04:46 AM    CREATININE 4.0 (H) 02/25/2021 04:46 AM    GLUCOSE 136 (H) 02/25/2021 04:46 AM     COAGS  Lab Results   Component Value Date/Time    PROTIME 12.6 02/24/2021 09:32 AM    INR 1.09 02/24/2021 09:32 AM    APTT 30.1 02/24/2021 09:32 AM     EKG 2/24/21  Baseline artifactSinus rhythmintermittent ventricular pseudofusionNo previous ECGs availableConfirmed by Teena Felipe MD, Cris Singh ([id number]) on 2/24/2021 7:20:06 PM       Anesthesia Plan      general     ASA 3 - emergent     (I discussed with the patient the risks and benefits of PIV, anesthesia, IV Narcotics, PACU. All questions were answered the patient agrees with the plan and wishes to proceed)  Induction: intravenous.                           Camryn Lyons MD   2/25/2021

## 2021-02-25 NOTE — CONSULTS
[address] [address]                                  CONSULTATION    PATIENT NAME: Donte Robison                       :        1925  MED REC NO:   [id number]                          ROOM:       6103  ACCOUNT NO:   [de-identified]                           ADMIT DATE: 2021  PROVIDER:     Teri Kang. Aleksandar Carey MD    CONSULT DATE:  2021    REASON FOR CONSULTATION:  Elevated troponin. HISTORY OF PRESENT ILLNESS:  A 43-year-old male who was brought to the  hospital due to rectal bleeding and lethargy. The patient is a poor  historian. Much of the history is obtained from his daughter, the  staff, and the chart. He recently was moved here by his family to the  [address] [address] from [place], [place]. All  of his prior care was in [place]. He was seen by his primary care physician and noted to be lethargic and  hypoxic. They obtained laboratory data and found his hemoglobin to be  5.5 and his creatinine 3.5. He was subsequently sent to the emergency  room for further care. The patient denies any chest pain, shortness of  breath, palpitations. Just feels weak. Further history is unable to be  obtained. PAST MEDICAL HISTORY:  1. Coronary artery disease. He is status post coronary artery bypass  surgery, details are unavailable at this time. 2.  Hypertension. 3.  Permanent pacemaker. 4.  Hyperlipidemia. 5.  Chronic kidney disease. SOCIAL HISTORY:  He does not smoke. FAMILY HISTORY:  Positive for heart disease. REVIEW OF SYSTEMS:  He has had trouble with melena, hematuria,  generalized weakness. No fevers or chills. No cough. No focal  neurologic abnormalities. No syncope. All other systems are negative  except as present illness. ALLERGIES:  See list in the chart which I have reviewed. MEDICATIONS:  See list in the chart which I have reviewed. PHYSICAL EXAMINATION:  VITAL SIGNS:  Blood pressure is 135/65, heart rate 64, respirations 12,  temperature 97.6. He is 97% saturated on room air. GENERAL:  A pale white male in no acute distress. HEENT:  Normocephalic, atraumatic. Oropharynx clear. Dry mucous  membranes. NECK:  Supple. CHEST:  Clear. CARDIAC:  Regular S1, S2. There is no S3 or S4 gallop. There is a soft  systolic murmur. Jugular venous pressure is normal.  Carotids are 2+  and symmetric without bruit. ABDOMEN:  Soft, nontender. Decreased bowel sounds. EXTREMITIES:  No edema. NEUROLOGIC:  Lethargic, but nonfocal.  SKIN:  Warm and dry. PSYCHIATRIC:  Affect blunted. DIAGNOSTIC DATA:  EKG:  Sinus rhythm with ventricular pacing. Nodular  opacity, possible airspace disease versus malignancy. LABORATORY DATA:  Potassium is 6. Creatinine is 4.1. Hemoglobin is 6. Troponin is 0.14. IMPRESSION:  1. Elevated troponin due to acute illness including acute renal failure  and anemia. There is otherwise no evidence of acute coronary syndrome. 2.  GI bleed. 3.  Anemia. 4.  Acute kidney injury. 5.  Coronary artery disease, status post coronary artery bypass surgery. 6.  Hypertension. 7.  Permanent pacemaker. 8.  Hyperlipidemia. RECOMMENDATIONS:  1. Conservative medical management is the best option for this patient. This was discussed with the daughter. She is in agreement. 2.  Echocardiogram.  3.  Continue beta-blocker. 4.  Add statin. 5.  No aspirin or anticoagulation due to anemia and GI bleed. GILA Lackey MD    D: 02/24/2021 17:41:58       T: 02/24/2021 20:02:31     [id number]  Job#: [id number]     Doc#: [id number]    CC:

## 2021-02-25 NOTE — CONSULTS
2/25/2021  Monty Luo    Reason for Consult: Jose catheter placement  Requesting Physician:  Tra Villatoro      History Obtained From:  patient, electronic medical record    HISTORY OF PRESENT ILLNESS:                The patient is a 80 y.o. male who presents with a GI bleed. Patient has been found to be in retention and needs a jose catheter. Past hx recently of jose trauma per notes at an outside hospital.  Bladder scan > 1000 cc. Past Medical History:    History reviewed. No pertinent past medical history. Past Surgical History:    History reviewed. No pertinent surgical history.   Current Medications:   Current Facility-Administered Medications: pantoprazole (PROTONIX) 80 mg in sodium chloride 0.9 % 100 mL infusion, 8 mg/hr, Intravenous, Continuous  0.9 % sodium chloride infusion, , Intravenous, PRN  0.9 % sodium chloride infusion, , Intravenous, PRN  sodium chloride flush 0.9 % injection 10 mL, 10 mL, Intravenous, 2 times per day  sodium chloride flush 0.9 % injection 10 mL, 10 mL, Intravenous, PRN  promethazine (PHENERGAN) tablet 12.5 mg, 12.5 mg, Oral, Q6H PRN **OR** ondansetron (ZOFRAN) injection 4 mg, 4 mg, Intravenous, Q6H PRN  acetaminophen (TYLENOL) tablet 650 mg, 650 mg, Oral, Q6H PRN **OR** acetaminophen (TYLENOL) suppository 650 mg, 650 mg, Rectal, Q6H PRN  levothyroxine (SYNTHROID) injection 37.5 mcg, 37.5 mcg, Intravenous, Daily  fluticasone (FLONASE) 50 MCG/ACT nasal spray 1 spray, 1 spray, Each Nostril, Daily  ipratropium (ATROVENT) 0.02 % nebulizer solution 0.5 mg, 0.5 mg, Nebulization, TID  sodium bicarbonate 150 mEq in dextrose 5 % 1,000 mL infusion, , Intravenous, Continuous  metoprolol succinate (TOPROL XL) extended release tablet 25 mg, 25 mg, Oral, Daily  atorvastatin (LIPITOR) tablet 40 mg, 40 mg, Oral, Nightly  perflutren lipid microspheres (DEFINITY) injection 1.65 mg, 1.5 mL, Intravenous, ONCE PRN doxycycline (VIBRAMYCIN) 100 mg in dextrose 5 % 100 mL IVPB, 100 mg, Intravenous, Q12H  cefTRIAXone (ROCEPHIN) 1000 mg IVPB in 50 mL D5W minibag, 1,000 mg, Intravenous, Q24H  morphine (PF) injection 2 mg, 2 mg, Intravenous, Q3H PRN  Allergies: Allergies   Allergen Reactions    Albuterol      Social History:  Reviewed, non contributory    Family History:  Reviewed, non contributory      REVIEW OF SYSTEMS:    12 point ROS has been completed and reviewed. PHYSICAL EXAM:    VITALS:  BP (!) 102/44   Pulse 62   Temp 97.6 °F (36.4 °C) (Axillary)   Resp 15   Ht 5' 5\" (1.651 m)   Wt 131 lb 13.4 oz (59.8 kg)   SpO2 98%   BMI 21.94 kg/m²   H&N: Sclera normal, no masses, trachea midline, no bruit  CVS: Normal rate and rhythm, no murmurs or rubs, peripheral pulses equal, no clubbing or cyanosis. RESP: Breath sounds equal bilateral, few rhonchi. ABDO: Soft, tender in the suprapubic area, bowel sounds active, no organomegaly, no hernias. LYMPH:  No lymphadenopathy. Skin: Warm dry and intact. : No CVAT, bladder palpable, normal uncirc external genitalia, no discharge, unable to do ECTOR at this time. MSK: Grossly normal for patient  ARTURO: Grossly normal for patient  PSY: No acute changes noted in psychosocial assessment.     DATA:    CBC:   Lab Results   Component Value Date    WBC 8.4 02/25/2021    RBC 2.59 02/25/2021    HGB 8.2 02/25/2021    HCT 24.3 02/25/2021    MCV 93.8 02/25/2021    MCH 31.7 02/25/2021    MCHC 33.8 02/25/2021    RDW 21.3 02/25/2021     02/25/2021    MPV 8.8 02/25/2021     BMP:    Lab Results   Component Value Date     02/25/2021    K 5.2 02/25/2021     02/25/2021    CO2 24 02/25/2021    BUN 63 02/25/2021    LABALBU 3.7 02/24/2021    CREATININE 4.0 02/25/2021    CALCIUM 9.3 02/25/2021    GFRAA 17 02/25/2021    LABGLOM 14 02/25/2021    GLUCOSE 136 02/25/2021     U/A:    Lab Results   Component Value Date    COLORU RED 02/25/2021    PROTEINU >=300 02/25/2021

## 2021-02-26 PROBLEM — I27.20 PULMONARY HTN (HCC): Status: ACTIVE | Noted: 2021-01-01

## 2021-02-26 PROBLEM — I10 ESSENTIAL HYPERTENSION: Status: ACTIVE | Noted: 2021-01-01

## 2021-02-26 NOTE — PROGRESS NOTES
[address]     Cardiology                                     Progress Note    Admission date:  2021    Reason for follow up visit: elevated troponin    HPI/CC: Johanny Caro was admitted on 2021 with rectal bleeding (H&H 6.0/18.6). Was recently discharged from hospital in [facility] for PNA and had reported traumatic jose insertion. Has also been treated for ANGEL and elevated troponin. Echo 2021 showed an EF of 55-60% and severe pulmonary HTN. Rhythm has been AP-. Subjective: He complains of some chronic SOB. Doesn't answer most questions directly. Denies chest pain. Vitals:  Blood pressure 124/64, pulse 60, temperature 98 °F (36.7 °C), temperature source Oral, resp. rate 16, height 5' 5\" (1.651 m), weight 130 lb 1.1 oz (59 kg), SpO2 97 %.   Temp  Av.9 °F (36.6 °C)  Min: 97.7 °F (36.5 °C)  Max: 98 °F (36.7 °C)  Pulse  Av.1  Min: 60  Max: 70  BP  Min: 86/73  Max: 145/58  SpO2  Av.2 %  Min: 82 %  Max: 97 %    24 hour I/O    Intake/Output Summary (Last 24 hours) at 2021 1425  Last data filed at 2021 1017  Gross per 24 hour   Intake 130 ml   Output 1440 ml   Net -1310 ml     Current Facility-Administered Medications   Medication Dose Route Frequency Provider Last Rate Last Admin    [START ON 2021] metoprolol succinate (TOPROL XL) extended release tablet 12.5 mg  12.5 mg Oral Daily Silvia Gutierrez MD        ipratropium (ATROVENT) 0.02 % nebulizer solution 0.5 mg  0.5 mg Nebulization Q6H PRN Silvia Gutierrez MD        mupirocin (BACTROBAN) 2 % ointment   Nasal BID Silvia Gutierrez MD   Given at 214    pantoprazole (PROTONIX) tablet 40 mg  40 mg Oral QAM AC Raquel Bishop MD   40 mg at 21 1017    0.9 % sodium chloride infusion   Intravenous Continuous Oscar Butts MD 75 mL/hr at 21 0406 New Bag at 21 0406    0.9 % sodium chloride infusion   Intravenous PRN NAOMI Reed  0.9 % sodium chloride infusion   Intravenous PRN NAOMI Villeda        sodium chloride flush 0.9 % injection 10 mL  10 mL Intravenous 2 times per day Lizzie Marquez MD   10 mL at 02/26/21 1017    sodium chloride flush 0.9 % injection 10 mL  10 mL Intravenous PRN Lizzie Marquez MD        promethazine (PHENERGAN) tablet 12.5 mg  12.5 mg Oral Q6H PRN Lizzie Marquez MD        Or    ondansetron [facility]) injection 4 mg  4 mg Intravenous Q6H PRN Lizzie Marquez MD        acetaminophen (TYLENOL) tablet 650 mg  650 mg Oral Q6H PRN Lizzie Marquez MD   650 mg at 02/25/21 2354    Or    acetaminophen (TYLENOL) suppository 650 mg  650 mg Rectal Q6H PRN Lizzie Marquez MD        levothyroxine (SYNTHROID) injection 37.5 mcg  37.5 mcg Intravenous Daily Lizzie Marquez MD   37.5 mcg at 02/26/21 1017    fluticasone (FLONASE) 50 MCG/ACT nasal spray 1 spray  1 spray Each Nostril Daily Lizzie Marquez MD   1 spray at 02/26/21 1017    atorvastatin (LIPITOR) tablet 40 mg  40 mg Oral Nightly Candido Gilliam MD        perflutren lipid microspheres (DEFINITY) injection 1.65 mg  1.5 mL Intravenous ONCE PRN Candido Gilliam MD        doxycycline (VIBRAMYCIN) 100 mg in dextrose 5 % 100 mL IVPB  100 mg Intravenous Q12H Lizzie Marquez MD   Stopped at 02/26/21 0636    cefTRIAXone (ROCEPHIN) 1000 mg IVPB in 50 mL D5W minibag  1,000 mg Intravenous Q24H Lizzie Marquez  mL/hr at 02/25/21 1807 1,000 mg at 02/25/21 1807    morphine (PF) injection 2 mg  2 mg Intravenous Q3H PRN Iesha Pimentel MD   2 mg at 02/24/21 2354       Objective:     Telemetry monitor: AP-    Physical Exam:  Constitutional and general appearance: alert, cooperative, no distress, appears stated age and pale; + frail   HEENT: PERRL, no cervical lymphadenopathy. No masses palpable.  Normal oral mucosa  Respiratory:  · Normal excursion and expansion without use of accessory muscles · Resp auscultation: Normal breath sounds without wheezing, rhonchi, and rales  Cardiovascular:  · The apical impulse is not displaced  · Heart tones are crisp and normal. regular S1 and S2.  · Jugular venous pulsation elevated  · The carotid upstroke is normal in amplitude and contour without delay or bruit  · Peripheral pulses are symmetrical and full   Abdomen:  · No masses or tenderness  · Bowel sounds present  Extremities:  ·  No cyanosis or clubbing  ·  No lower extremity edema but + evidence of PVD  ·  Skin: warm and dry  Neurological:  · Alert and oriented  · Moves all extremities well  · No abnormalities of mood, affect, memory, mentation, or behavior are noted    Data    Echo 2/26/2021:  Technically difficult examination. Normal left ventricular systolic function with ejection fraction of 55-60%. No regional wall motion abnormalites are seen. Mild concentric left ventricular hypertrophy. Grade I diastolic dysfunction with normal filling pressure. Moderate mitral and tricuspid regurgitation. The left atrium is mildly dilated. Moderate aortic stenosis  Mild aortic regurgitation. Systolic pulmonic artery pressure (SPAP) is estimated at 75 mmHg consistent with severe pulmonary hypertension (Right atrial pressure of 15 mmHg). All labs and testing reviewed.   Lab Review     Renal Profile:   Lab Results   Component Value Date    CREATININE 4.2 02/26/2021    BUN 57 02/26/2021     02/26/2021    K 4.8 02/26/2021     02/26/2021    CO2 25 02/26/2021     CBC:    Lab Results   Component Value Date    WBC 8.4 02/25/2021    RBC 2.59 02/25/2021    HGB 8.2 02/26/2021    HCT 24.2 02/26/2021    MCV 93.8 02/25/2021    RDW 21.3 02/25/2021     02/25/2021     BNP:  No results found for: BNP  Fasting Lipid Panel:  No results found for: CHOL, HDL, TRIG  Cardiac Enzymes:  CK/MbTroponin  Lab Results   Component Value Date    TROPONINI 0.28 02/25/2021     PT/ INR   Lab Results   Component Value Date INR 1.09 02/24/2021    PROTIME 12.6 02/24/2021     PTT No results found for: PTT No results found for: MG No results found for: TSH    Assessment:  Elevated troponin   -0.28 this admission    -in the setting of anemia and ANGEL   -EF normal on echo 2/2021  HTN: some hypotension noted this admission  CAD   -reported history of remote CABG in [place]   -EF normal on echo 2/2021  Aortic stenosis: moderate   Pulmonary HTN: severe   History of atrial flutter  History of bradycardia    -s/p remote pacemaker   GI bleed/duodenal ulcer   -s/p EGD 2/25/2021  Acute blood loss anemia  Urinary retention/recent traumatic jose insertion  Acute on chronic renal failure    -nephrology following   Hyperkalemia   Hypothyroidism   -TSH 55 2/2021    Plan:   1. Continue statin and Toprol   2. Off ASA and Eliquis due to bleeding. Resume when able. 3. Unable to add ACE/ARB due to renal failure   4.  Fluid management per nephrology    Larry Hendrikcs, [address]  [phone]

## 2021-02-26 NOTE — PROGRESS NOTES
Urology Attending Progress Note      Subjective: pt is \"feeling weak\"    Vitals:  /64   Pulse 61   Temp 97.7 °F (36.5 °C) (Oral)   Resp 16   Ht 5' 5\" (1.651 m)   Wt 130 lb 1.1 oz (59 kg)   SpO2 94%   BMI 21.64 kg/m²   Temp  Av.7 °F (36.5 °C)  Min: 97.7 °F (36.5 °C)  Max: 97.7 °F (36.5 °C)    Intake/Output Summary (Last 24 hours) at 2021 0959  Last data filed at 2021 9392  Gross per 24 hour   Intake 120 ml   Output 1610 ml   Net -1490 ml       Exam: abd soft, urine in jose clear    Labs:  WBC:    Lab Results   Component Value Date    WBC 8.4 2021     Hemoglobin/Hematocrit:    Lab Results   Component Value Date    HGB 8.2 2021    HCT 24.2 2021     BMP:    Lab Results   Component Value Date     2021    K 4.8 2021     2021    CO2 25 2021    BUN 57 2021    LABALBU 3.7 2021    CREATININE 4.2 2021    CALCIUM 8.9 2021    GFRAA 16 2021    LABGLOM 13 2021     PT/INR:    Lab Results   Component Value Date    PROTIME 12.6 2021    INR 1.09 2021     PTT:    Lab Results   Component Value Date    APTT 30.1 2021   [APTT    Imaging: US no hydro      Impression/Plan: Urinary retention, ANGEL  1. Pt is weak and will leave jose in place, will arrange f/u in a few weeks as an outpatient  2.  ANGEL- US shows no hydro    Win Purcell MD

## 2021-02-26 NOTE — PROGRESS NOTES
Nephrology Progress  Note                                                                                                                                                                                                                                                                                                                                                               Office : [phone]     Fax :[phone]              Patient's Name: Monty Luo  43:33 PM  2/26/2021    Reason for Consult: ANGEL    Requesting Physician:  Law Solares MD      Chief Complaint:  Anemia, GI bleed     History of Present Ilness:    Monty Luo is a 80 y.o. male with PMH of PNA  ( recent hospitalization for PNA ) and was discharged from hospital on 2/18  H/o CKD stage 4 , was on Patiromer ( K binder )  , recently has moved from [facility] to [facility], was brought in with c/o  GI bleeding    Onset last 2 days    Outside lab hb was < 6    Here in ED Hb  6, s/p 2 units of PRBC transfusion in ED    No diarrhea         Interval history :      Serum cr high but stable    Hyperkalemia improved    S/p EGD     [place] +    Daughter at bedside    History reviewed. No pertinent past medical history. History reviewed. No pertinent surgical history. History reviewed. No pertinent family history.          Allergies:  Albuterol    Current Medications:        [START ON 2/27/2021] metoprolol succinate (TOPROL XL) extended release tablet 12.5 mg, Daily      mupirocin (BACTROBAN) 2 % ointment, BID      pantoprazole (PROTONIX) tablet 40 mg, QAM AC      0.9 % sodium chloride infusion, Continuous      0.9 % sodium chloride infusion, PRN      0.9 % sodium chloride infusion, PRN      sodium chloride flush 0.9 % injection 10 mL, 2 times per day      sodium chloride flush 0.9 % injection 10 mL, PRN      promethazine (PHENERGAN) tablet 12.5 mg, Q6H PRN    Or      ondansetron (ZOFRAN) injection 4 mg, Q6H PRN      acetaminophen (TYLENOL) tablet 650 mg, Q6H PRN    Or      acetaminophen (TYLENOL) suppository 650 mg, Q6H PRN      levothyroxine (SYNTHROID) injection 37.5 mcg, Daily      fluticasone (FLONASE) 50 MCG/ACT nasal spray 1 spray, Daily      ipratropium (ATROVENT) 0.02 % nebulizer solution 0.5 mg, TID      atorvastatin (LIPITOR) tablet 40 mg, Nightly      perflutren lipid microspheres (DEFINITY) injection 1.65 mg, ONCE PRN      doxycycline (VIBRAMYCIN) 100 mg in dextrose 5 % 100 mL IVPB, Q12H      cefTRIAXone (ROCEPHIN) 1000 mg IVPB in 50 mL D5W minibag, Q24H      morphine (PF) injection 2 mg, Q3H PRN        Review of Systems:   14 point ROS obtained but were negative except mentioned in HPI      Physical exam:     Vitals:  BP (!) 145/58   Pulse 65   Temp 98 °F (36.7 °C) (Oral)   Resp 16   Ht 5' 5\" (1.651 m)   Wt 130 lb 1.1 oz (59 kg)   SpO2 96%   BMI 21.64 kg/m²   Constitutional:  OAA X3 NAD  Skin: no rash, turgor wnl  Heent:  eomi, mmm  Neck: no bruits or jvd noted  Cardiovascular:  S1, S2 without m/r/g  Respiratory: CTA B without w/r/r  Abdomen:  +bs, soft, nt, nd  Ext: no lower extremity edema  Psychiatric: mood and affect appropriate  Musculoskeletal:  Rom, muscular strength intact    Data:   Labs:  CBC:   Recent Labs     02/24/21  0932 02/24/21  0932 02/25/21  0446 02/25/21  0446 02/25/21  1940 02/26/21  0145 02/26/21  0719   WBC 8.8  --  8.4  --   --   --   --    HGB 6.0*   < > 8.2*   < > 8.2* 7.9* 8.2*     --  203  --   --   --   --     < > = values in this interval not displayed.      BMP:    Recent Labs     02/24/21  1706 02/25/21  0446 02/26/21  0719    139 138   K 5.3* 5.2* 4.8    105 103   CO2 20* 24 25   BUN 67* 63* 57*   CREATININE 4.2* 4.0* 4.2*   GLUCOSE 107* 136* 104*     Ca/Mg/Phos:   Recent Labs     02/24/21  1706 02/25/21  0446 02/26/21  0719   CALCIUM 10.8* 9.3 8.9     Hepatic:   Recent Labs     02/24/21  0932   AST 44*   ALT 29   BILITOT 0.4   ALKPHOS 57     Troponin:   Recent dose as per GFR    Avoid ACEI or ARB      2 Hyperkalemia in setting of ANGEL and acidosis  : improved      Got temporizing measure in ED : ca gluconate + dextrose + insulin          3 Non anion gap metabolic acidosis : improved          4.  GI bleed   PPI   transfuse prn     S/p EGD    5 Anemia due to GI bleed                   Thank you for allowing us to participate in care of [address] free to contact me   [facility] [address]  Office : [phone]  Fax :[phone]

## 2021-02-26 NOTE — PROGRESS NOTES
RESPIRATORY THERAPY ASSESSMENT FOR PRN PATIENTS    Name:  Peter Mendez  Medical Record Number:  [id number]  Age: 80 y.o. Gender: male  : 1925  Today's Date:  2021  Room:  [address] [place], [place] [id number]-  Patient Admission Diagnosis      Allergies  Allergies   Allergen Reactions    Albuterol        Vital Signs   BP (!) 145/58   Pulse 65   Temp 98 °F (36.7 °C) (Oral)   Resp 16   Ht 5' 5\" (1.651 m)   Wt 130 lb 1.1 oz (59 kg)   SpO2 96%   BMI 21.64 kg/m²     Plan       Goals: Patient does not have any Respiratory Care goals at this time. Comments: Patient here for acute GI bleed, per chart. Plan of Care: Atrovent HHN Q6H PRN. Re-evaluate as needed.     Patient/caregiver was educated on the proper method of use of Respiratory Therapy device:  Yes      Level of patient/caregiver understanding able to:   [] Verbalize understanding   [] Demonstrate understanding       [] Teach back        [] Needs reinforcement       []  No available caregiver               []  Other:     Response to education:  Excellent     Electronically signed by Cesar Breen RCP, RRT, RRT-ACCS on 2021 at 1:00 PM

## 2021-02-26 NOTE — PROGRESS NOTES
Physical Therapy    Facility/Department: [facility] B3 - MED SURG  Initial Assessment and Treatment    NAME: Jamie Dejesus  : 1925  MRN: [id number]    Date of Service: 2021    Discharge Recommendations:  Subacute/Skilled Nursing Facility   PT Equipment Recommendations  Equipment Needed: No  Other: Defer to next level of care. Assessment   Body structures, Functions, Activity limitations: Decreased functional mobility ; Decreased strength;Decreased safe awareness;Decreased cognition;Decreased endurance;Decreased balance;Decreased coordination;Decreased posture  Assessment: Pt is a 79 y/o male presenting to [facility] with Gastrointestinal hemorrhage. PTA, pt recently moved to the [address] [place] [address] within the past 1-2 weeks per daughter. Pt answered all history questions confidently that he was living in a multi-level home in [place]. Pt currently requires Min A x2 for bed mobility, standing with bilateral HHA and ambulation with bilateral HHA with Min A x2. Pt seen with OT d/t unknown current level of function, but will no longer warrant a co-tx. Recommend SNF at d/c d/t unsteady when standing, decreased strength, and decreased independence with functional mobility. Continue POC in the acute care setting. Treatment Diagnosis: Decreased functional mobility  Prognosis: Good  Decision Making: Medium Complexity  PT Education: Goals;PT Role;Plan of Care;Home Exercise Program;Functional Mobility Training;Gait Training;General Safety  Patient Education: Pt verbalized understanding for all education for HEP, but will require additional education d/t impaired cognition. REQUIRES PT FOLLOW UP: Yes  Activity Tolerance  Activity Tolerance: Patient Tolerated treatment well;Patient limited by cognitive status  Activity Tolerance: Pt pleasantly confused at times during session. BP approximately 124/64 throughout session with changes in positioning. Patient Diagnosis(es): The primary encounter diagnosis was Gastrointestinal hemorrhage, unspecified gastrointestinal hemorrhage type. Diagnoses of Acute renal failure, unspecified acute renal failure type (HCC) and Elevated troponin were also pertinent to this visit. has no past medical history on file. has no past surgical history on file. Restrictions  Restrictions/Precautions  Restrictions/Precautions: General Precautions, Fall Risk  Position Activity Restriction  Other position/activity restrictions: up with assistance  Vision/Hearing  Vision: Impaired  Vision Exceptions: Wears glasses at all times  Hearing: Exceptions to [facility]  Hearing Exceptions: Hard of hearing/hearing concerns     Subjective  General  Chart Reviewed: Yes  Patient assessed for rehabilitation services?: Yes  Response To Previous Treatment: Not applicable  Referring Practitioner: Iraida Dawn MD  Referral Date : 02/26/21  Subjective  Subjective: Pt agreeable to therapy. Pleasantly confused at times. Pain Screening  Patient Currently in Pain: Denies  Vital Signs  Patient Currently in Pain: Denies       Orientation  Orientation  Overall Orientation Status: Impaired  Orientation Level: Oriented to person  Social/Functional History  Social/Functional History  Lives With: Spouse  Type of Home: House  Home Layout: Multi-level  Bathroom Shower/Tub: Tub/Shower unit  Bathroom Equipment: Shower chair  Home Equipment: Rolling walker, Cane  ADL Assistance: Independent  Homemaking Assistance: Independent  Homemaking Responsibilities: Yes  Ambulation Assistance: Independent  Transfer Assistance: Independent  Occupation: Retired  Type of occupation:   Additional Comments: Pt is a questionable historian. Per daughter, pt just moved in the [address] [place] [address] in independent living. Cognition   Cognition  Overall Cognitive Status: Exceptions  Arousal/Alertness: Delayed responses to stimuli  Following Commands:  Follows one step commands with repetition Attention Span: Attends with cues to redirect  Memory: Decreased short term memory;Decreased long term memory;Decreased recall of biographical Information  Insights: Decreased awareness of deficits  Initiation: Requires cues for some  Sequencing: Requires cues for some    Objective          AROM RLE (degrees)  RLE AROM: WFL  AROM LLE (degrees)  LLE AROM : WFL  Strength RLE  Strength RLE: Exception  Comment: Grossly 3+/5  Strength LLE  Strength LLE: Exception  Comment: Grossly 3+/5  Tone RLE  RLE Tone: Normotonic  Tone LLE  LLE Tone: Normotonic  Sensation  Overall Sensation Status: WNL  Bed mobility  Supine to Sit: Minimal assistance;2 Person assistance(for trunk control and BLE management, slow to complete, cues for all positioning.)  Sit to Supine: Unable to assess(Pt up in chair at end of session.)  Scooting: Minimal assistance(with draw sheet to the EOB.)  Transfers  Sit to Stand: Minimal Assistance;2 Person Assistance  Stand to sit: Minimal Assistance;2 Person Assistance  Bed to Chair: Minimal assistance;2 Person Assistance  Comment: with bilateral UE support. Ambulation  Ambulation?: Yes  More Ambulation?: No  Ambulation 1  Surface: level tile  Device: Hand-Held Assist  Assistance: Minimal assistance;2 Person assistance  Quality of Gait: Pt demos shuffled gait, decreased step length, narrow PHILIP, decreased nicki, forward trunk flexion. Pt given cues for upright posture, and all sequencing and positioning. Gait Deviations: Slow Nicki;Decreased step height;Decreased step length;Shuffles  Distance: 10 feet  Stairs/Curb  Stairs?: No     Balance  Posture: Fair  Sitting - Static: Good;-  Sitting - Dynamic: Good;-  Standing - Static: Fair;+  Standing - Dynamic: Fair  Comments: with bilateral UE support with HHA. Exercises  Heelslides: x10 BLE  Hip Flexion: x10 BLE  Hip Abduction: x10 BLE  Knee Short Arc Quad: x10 BLE  Ankle Pumps: x10 BLE  Comments: Slow to complete, all cues for all exercises.      Plan   Plan Times per week: 3-5x  Times per day: Daily  Plan weeks: 1 week  Current Treatment Recommendations: Strengthening, Balance Training, Functional Mobility Training, Transfer Training, Endurance Training, Gait Training, Stair training, Cognitive Reorientation, Pain Management, Home Exercise Program, Safety Education & Training, Patient/Caregiver Education & Training, Equipment Evaluation, Education, & procurement  Safety Devices  Type of devices: All fall risk precautions in place, Chair alarm in place, Left in chair, Patient at risk for falls, Gait belt, Nurse notified(Call light with daughter, who is present in the room)    AM-PAC Score  AM-PAC Inpatient Mobility Raw Score : 15 (02/26/21 1441)  AM-PAC Inpatient T-Scale Score : 39.45 (02/26/21 1441)  Mobility Inpatient CMS 0-100% Score: 57.7 (02/26/21 1441)  Mobility Inpatient CMS G-Code Modifier : CK (02/26/21 1441)        Goals  Short term goals  Time Frame for Short term goals: 5-7 days, unless otherwise stated, by 3/5/21  Short term goal 1: Pt will complete bed mobility with SBA. Short term goal 2: Pt will complete sit<>stands with CGA and LRAD. Short term goal 3: Pt will ambulate 50 feet with LRAD and CGA. Short term goal 4: Pt will complete 12-15 reps of BLE ther ex for strength and balance by 2/29/21. Patient Goals   Patient goals : To go home. Therapy Time   Individual Concurrent Group Co-treatment   Time In 1322         Time Out 1400         Minutes 38         Timed Code Treatment Minutes: 28 Minutes(10 minute eval)     If the pt is discharged prior to the next treatment session, this will serve as the discharge summary.       oNe Asp, PT

## 2021-02-26 NOTE — PROGRESS NOTES
Occupational Therapy   Occupational Therapy Initial Assessment/Treatment   Date: 2021   Patient Name: Navdeep Whitaker  MRN: [id number]     : 1925    Date of Service: 2021    Discharge Recommendations:  [address]  OT Equipment Recommendations  Other: defer    Assessment   Performance deficits / Impairments: Decreased functional mobility ; Decreased strength;Decreased endurance;Decreased ADL status; Decreased safe awareness;Decreased cognition;Decreased balance;Decreased high-level IADLs  Assessment: Pt 81 yo male functioning with deficits in the areas listed above following GI hemorrhage. Pt is a questional historian but daughter reports pt moved into the Independent living at the [facility] 5 days ago. Pt is currently limited due to deconditioning requriring Ax2. Pt addressed balance and stability while OT addressed safety awareness/adls. Pt required max A for LB dressing. Pt educated on BUE exercises to build strength and endurance. Pt would benefit from SNF level of care at discharge to improve functional levels. Prognosis: Good  Decision Making: Medium Complexity  OT Education: OT Role;Plan of Care;Family Education;Transfer Training;Home Exercise Program;Energy Conservation  Patient Education: importance of continued activity and OOB activity  REQUIRES OT FOLLOW UP: Yes  Activity Tolerance  Activity Tolerance: Patient Tolerated treatment well  Activity Tolerance: vitals stable with all functional mobility  Safety Devices  Safety Devices in place: Yes  Type of devices: Call light within reach; Chair alarm in place;Gait belt;Nurse notified; Left in chair           Patient Diagnosis(es): The primary encounter diagnosis was Gastrointestinal hemorrhage, unspecified gastrointestinal hemorrhage type. Diagnoses of Acute renal failure, unspecified acute renal failure type (HCC) and Elevated troponin were also pertinent to this visit. has no past medical history on file. has no past surgical history on file. Restrictions  Restrictions/Precautions  Restrictions/Precautions: General Precautions, Fall Risk  Position Activity Restriction  Other position/activity restrictions: up with assistance    Subjective   General  Chart Reviewed: Yes  Patient assessed for rehabilitation services?: Yes  Family / Caregiver Present: Yes  Referring Practitioner: Karo Sepulveda MD  Diagnosis: admitted for GIB  Subjective  Subjective: Pt agreeable to therapy  General Comment  Comments: RN approved therapy  Patient Currently in Pain: Denies  Vital Signs  Pulse: 60  BP: 124/64  Patient Currently in Pain: Denies  Oxygen Therapy  SpO2: 97 %     Social/Functional History  Social/Functional History  Lives With: Spouse  Type of Home: House  Home Layout: Multi-level  Bathroom Shower/Tub: Tub/Shower unit  Bathroom Equipment: Shower chair  Home Equipment: Rolling walker, Cane  ADL Assistance: Independent  Homemaking Assistance: Independent  Homemaking Responsibilities: Yes  Ambulation Assistance: Independent  Transfer Assistance: Independent  Occupation: Retired  Type of occupation:   Additional Comments: Pt is a questionable historian. Per daughter, pt just moved in the [address] [place] [address] in independent living. Objective   Vision: Impaired  Vision Exceptions: Wears glasses at all times  Hearing: Exceptions to [facility]  Hearing Exceptions: Hard of hearing/hearing concerns    Orientation  Overall Orientation Status: Impaired  Orientation Level: Oriented to person;Disoriented to situation;Disoriented to place; Disoriented to time  Observation/Palpation  Posture: Fair  Balance  Sitting Balance: Contact guard assistance  Standing Balance: Dependent/Total(min A x2 HHA)  Standing Balance  Time: ~4 minutes  Activity: transfer, functional mobility in preparation for household distances  Functional Mobility  Functional - Mobility Device: Other(HHA)  Activity: Other  Assist Level: Dependent/Total Functional Mobility Comments: min A x2  ADL  LE Dressing: Maximum assistance(socks)  Toileting: Dependent/Total  Tone RUE  RUE Tone: Normotonic  Tone LUE  LUE Tone: Normotonic  Coordination  Movements Are Fluid And Coordinated: Yes        Transfers  Sit to stand: Minimal assistance;2 Person assistance  Stand to sit: 2 Person assistance;Minimal assistance     Cognition  Overall Cognitive Status: Exceptions  Arousal/Alertness: Delayed responses to stimuli  Following Commands: Follows one step commands with repetition  Attention Span: Attends with cues to redirect  Memory: Decreased short term memory;Decreased long term memory;Decreased recall of biographical Information  Insights: Decreased awareness of deficits  Initiation: Requires cues for some  Sequencing: Requires cues for some        Sensation  Overall Sensation Status: WNL  Type of ROM/Therapeutic Exercise  Type of ROM/Therapeutic Exercise: AROM  Comment: BUE seated  Exercises  Elbow Flexion: x10  Elbow Extension: x10  Supination: x10  Pronation: x10  Wrist Flexion: x10  Wrist Extension: x10  Grasp/Release: x10     LUE AROM (degrees)  LUE AROM : WFL  RUE AROM (degrees)  RUE AROM : WFL  LUE Strength  L Hand General: 4/5  RUE Strength  R Hand General: 4/5                   Plan   Plan  Times per week: 3-5x/wk  Current Treatment Recommendations: Balance Training, Safety Education & Training, Self-Care / ADL      AM-[facility] Score        -[facility] Inpatient Daily Activity Raw Score: 13 (02/26/21 1446)  AM-PAC Inpatient ADL T-Scale Score : 32.03 (02/26/21 1446)  ADL Inpatient CMS 0-100% Score: 63.03 (02/26/21 1446)  ADL Inpatient CMS G-Code Modifier : CL (02/26/21 1446)    Goals  Short term goals  Time Frame for Short term goals: 1 week (3/04/21)  Short term goal 1: Pt will complete toilet transfer with CGA. Short term goal 2: Pt will complete LB dressing with CGA. Short term goal 3: Pt will complete 5 minutes of dynamic standing for adls with SBA. Short term goal 4: Pt will complete 15 reps of BUE exercises for increased endurance and strength. (3/01/21)  Patient Goals   Patient goals : \"to get stronger and get up to the chair\"       Therapy Time   Individual Concurrent Group Co-treatment   Time In 1326         Time Out 1400         Minutes 34         Timed Code Treatment Minutes: 24 Minutes(10 minutes for evaluation)       Dayne Reed OTR/L    If pt is unable to be seen after this session, please let this note serve as discharge summary. Please see case management note for discharge disposition. Thank you.

## 2021-02-26 NOTE — CARE COORDINATION
CASE MANAGEMENT INITIAL ASSESSMENT      Reviewed chart and completed assessment via telephone with: Pt's daughter Ashvin Baig   Explained Case Management role/services. Primary contact information: Grisel Oliver [phone]    Admit date/status: inpt 2/2/21  Diagnosis:Acute GI Bleed   Is this a Readmission?:  No      Insurance: [place] [employer]   Precert required for SNF: Yes       3 night stay required: No    Living arrangements, Adls, care needs, prior to admission: Pt lives at [facility] with his spouse. He is fairly independent and has several supportive people that check in on him. Transportation: Family to transport     [facility] [employer] at home:  Walker_x_Cane__RTS__ BSC__Shower Chair__  02__ HHN__ CPAP__  BiPap__  Hospital Bed__ W/C___ Other: [employer]. Services in the home and/or outpatient, prior to admission: [address] was set up but was not able to start due to hospitalization. Sw verified and they will follow for discharge orders. PT/OT recs: SNF    Hospital Exemption Notification (HEN): N/A    Barriers to discharge: N/A    Plan/comments: Osorio spoke with pt's daughter on this day who notes that she recently moved her parents up from [facility] to [facility]. She notes that [facility] was set to start. She has an interest to attempt to front load therapy rather than to consider SNF at this time. She notes that she, her family members, and people she knows at [facility] check in on her parents frequently. She also notes that her mother is capable to assist her father and call with any issues. [facility] can front load therapy,however the first visit may not occur until Monday.     ECOC on chart for MD signature

## 2021-02-26 NOTE — PROGRESS NOTES
[address] [address]    [address] [facility] ,  [address] [facility]  Phone: [phone] [facility],  [address] [facility], [address] [place] [address]  Phone: [phone]   Fax:[phone]    HPI: Mouna Marques is a(n)89 y.o. male 80y.o. year old male with medical history of hypothyroidism, coronary artery disease status post CABG and pacemaker, atrial flutter on Eliquis, CKD stage IV, hypertension presents melena and Hb 6 g/dL  Acute GI bleeding [K92.2]. We are following for upper GI bleed. Patient was seen and examined about 6:30 a.m. Bedside echo ongoing. Denies abdominal pain, nausea, vomiting, fever, chills, constipation, diarrhea, hematochezia or melenic stools. Current Hospital Schedued Meds   mupirocin   Nasal BID    pantoprazole  40 mg Oral QAM AC    sodium chloride flush  10 mL Intravenous 2 times per day    levothyroxine  37.5 mcg Intravenous Daily    fluticasone  1 spray Each Nostril Daily    ipratropium  0.5 mg Nebulization TID    metoprolol succinate  25 mg Oral Daily    atorvastatin  40 mg Oral Nightly    doxycycline (VIBRAMYCIN) IV  100 mg Intravenous Q12H    cefTRIAXone (ROCEPHIN) IV  1,000 mg Intravenous Q24H     Current Hospital IV Meds   sodium chloride 75 mL/hr at 02/26/21 0406    sodium chloride      sodium chloride       Current Hospital Prn Meds  sodium chloride, sodium chloride, sodium chloride flush, promethazine **OR** ondansetron, acetaminophen **OR** acetaminophen, perflutren lipid microspheres, morphine    I/O last 3 completed shifts:   In: 2670 [I.V.:2670]  Out: 2520 [Urine:2520]  BP (!) 88/58   Pulse 60   Temp 97.5 °F (36.4 °C) (Axillary)   Resp 15   Ht 5' 5\" (1.651 m)   Wt 130 lb 1.1 oz (59 kg)   SpO2 91%   BMI 21.64 kg/m²   BMs: 0  Wt Readings from Last 3 Encounters:   02/25/21 130 lb 1.1 oz (59 kg)       Physical Exam: VITAL SIGNS: BP (!) 88/58   Pulse 60   Temp 97.5 °F (36.4 °C) (Axillary)   Resp 15   Ht 5' 5\" (1.651 m)   Wt 130 lb 1.1 oz (59 kg)   SpO2 91%   BMI 21.64 kg/m²   Wt Readings from Last 3 Encounters:   02/25/21 130 lb 1.1 oz (59 kg)     Constitutional: Elderly male, well developed, Well nourished, No acute distress  HENT: Normocephalic, Atraumatic, Bilateral external ears normal, Oropharynx moist, No oral exudates, Nose normal.   Eyes: Conjunctiva normal, No discharge. Neck: Normal range of motion, No tenderness, Supple, No stridor. Lymphatic: No cervical, subclavian, or axillary lymphadenopathy. Cardiovascular: Sternotomy scar, pacemaker noted. Normal heart rate, Normal rhythm, No murmurs, No rubs  Thorax & Lungs: Normal breath sounds, No respiratory distress, No wheezing, No chest tenderness. Abdomen: normal bowel sounds, soft, non tender, non distended no hernias   Rectal:  Deferred. Skin: Warm, Dry, No erythema, No rash. No bruising. Lower Extremities: Intact distal pulses, No edema, No tenderness, No cyanosis, No clubbing. Neurologic: Alert & oriented x 3      Lab Results   Component Value Date    ALT 29 02/24/2021    AST 44 (H) 02/24/2021    ALKPHOS 57 02/24/2021    PROT 6.6 02/24/2021    LABALBU 3.7 02/24/2021    INR 1.09 02/24/2021     Lab Results   Component Value Date    WBC 8.4 02/25/2021    HGB 7.9 (L) 02/26/2021    HCT 23.8 (L) 02/26/2021    MCV 93.8 02/25/2021     02/25/2021     Lab Results   Component Value Date     02/25/2021    K 5.2 02/25/2021     02/25/2021    CO2 24 02/25/2021    BUN 63 02/25/2021     Lab Results   Component Value Date    CREATININE 4.0 (H) 02/25/2021     EGD 2/25/2021:   Normal entire esophagus  Regular Z-line at 40 cm from incisors  A few 3-5 mm superficial clean based nonbleeding ulcers in antrum. Not biopsied. Mild erythematous mucosa in antrum and body of stomach suggestive of mild gastritis. Not biopsied.

## 2021-02-26 NOTE — PLAN OF CARE
Problem: Falls - Risk of:  Goal: Will remain free from falls  Description: Will remain free from falls  Outcome: Ongoing  Goal: Absence of physical injury  Description: Absence of physical injury  Outcome: Ongoing     Problem: Non-Violent Restraints  Goal: Removal from restraints as soon as assessed to be safe  Outcome: Ongoing  Goal: No harm/injury to patient while restraints in use  Outcome: Ongoing  Goal: Patient's dignity will be maintained  Outcome: Ongoing     Problem: Skin Integrity:  Goal: Will show no infection signs and symptoms  Description: Will show no infection signs and symptoms  Outcome: Ongoing  Goal: Absence of new skin breakdown  Description: Absence of new skin breakdown  Outcome: Ongoing

## 2021-02-27 NOTE — PROGRESS NOTES
Urology Attending Progress Note      Subjective: asleep, jose ws pulled out by pt several times last ight    Vitals:  /63   Pulse 60   Temp 97.8 °F (36.6 °C) (Oral)   Resp 16   Ht 5' 5\" (1.651 m)   Wt 130 lb 1.1 oz (59 kg)   SpO2 95%   BMI 21.64 kg/m²   Temp  Av.6 °F (36.4 °C)  Min: 97.4 °F (36.3 °C)  Max: 98 °F (36.7 °C)    Intake/Output Summary (Last 24 hours) at 2021 0940  Last data filed at 2021 0536  Gross per 24 hour   Intake 814 ml   Output 1375 ml   Net -561 ml       Exam: jose with pinkurine    Labs:  WBC:    Lab Results   Component Value Date    WBC 8.4 2021     Hemoglobin/Hematocrit:    Lab Results   Component Value Date    HGB 8.2 2021    HCT 24.2 2021     BMP:    Lab Results   Component Value Date     2021    K 4.8 2021     2021    CO2 25 2021    BUN 57 2021    LABALBU 3.7 2021    CREATININE 4.2 2021    CALCIUM 8.9 2021    GFRAA 16 2021    LABGLOM 13 2021     PT/INR:    Lab Results   Component Value Date    PROTIME 12.6 2021    INR 1.09 2021     PTT:    Lab Results   Component Value Date    APTT 30.1 2021   [APTT    Urinalysis:     Urine Culture:      Blood Culture:      Antibiotic Therapy:      Imaging:       Impression/Plan: pt with retention and jose, plans were to d/c with jose and f/u as outpatient. Recommend caregiver stay with pt to prevent jose being pulled out again, if he stays in hospital couple more days could consider voiding trial    [facility]

## 2021-02-27 NOTE — PROGRESS NOTES
Nephrology Progress  Note                                                                                                                                                                                                                                                                                                                                                               Office : [phone]     Fax :[phone]              Patient's Name: Nicole Jacob  2:11 PM  2/27/2021    Reason for Consult: ANGEL    Requesting Physician:  Frank Ronquillo MD      Chief Complaint:  Anemia, GI bleed     History of Present Ilness:    Nicole Jacob is a 80 y.o. male with PMH of PNA  ( recent hospitalization for PNA ) and was discharged from hospital on 2/18  H/o CKD stage 4 , was on Patiromer ( K binder )  , recently has moved from [place] to [facility], was brought in with c/o  GI bleeding    Onset last 2 days    Outside lab hb was < 6    Here in ED Hb  6, s/p 2 units of PRBC transfusion in ED    No diarrhea         Interval history :      Renal fn improving    Hyperkalemia improved    S/p EGD     Mancuso +    Daughter at bedside    History reviewed. No pertinent past medical history. History reviewed. No pertinent surgical history. History reviewed. No pertinent family history.          Allergies:  Albuterol    Current Medications:        [START ON 3/1/2021] patiromer sorbitex calcium (VELTASSA) packet 8.4 g, Q MWF      metoprolol succinate (TOPROL XL) extended release tablet 12.5 mg, Daily      ipratropium (ATROVENT) 0.02 % nebulizer solution 0.5 mg, Q6H PRN      levothyroxine (SYNTHROID) tablet 100 mcg, Daily      mupirocin (BACTROBAN) 2 % ointment, BID      pantoprazole (PROTONIX) tablet 40 mg, QAM AC      0.9 % sodium chloride infusion, Continuous      0.9 % sodium chloride infusion, PRN      0.9 % sodium chloride infusion, PRN      sodium chloride flush 0.9 % injection 10 mL, 2 times per day     sodium chloride flush 0.9 % injection 10 mL, PRN      promethazine (PHENERGAN) tablet 12.5 mg, Q6H PRN    Or      ondansetron (ZOFRAN) injection 4 mg, Q6H PRN      acetaminophen (TYLENOL) tablet 650 mg, Q6H PRN    Or      acetaminophen (TYLENOL) suppository 650 mg, Q6H PRN      fluticasone (FLONASE) 50 MCG/ACT nasal spray 1 spray, Daily      atorvastatin (LIPITOR) tablet 40 mg, Nightly      perflutren lipid microspheres (DEFINITY) injection 1.65 mg, ONCE PRN      doxycycline (VIBRAMYCIN) 100 mg in dextrose 5 % 100 mL IVPB, Q12H      cefTRIAXone (ROCEPHIN) 1000 mg IVPB in 50 mL D5W minibag, Q24H      morphine (PF) injection 2 mg, Q3H PRN        Review of Systems:   14 point ROS obtained but were negative except mentioned in HPI      Physical exam:     Vitals:  BP (!) 104/55   Pulse 57   Temp 97.3 °F (36.3 °C)   Resp 16   Ht 5' 5\" (1.651 m)   Wt 130 lb 1.1 oz (59 kg)   SpO2 97%   BMI 21.64 kg/m²   Constitutional:  OAA X3 NAD  Skin: no rash, turgor wnl  Heent:  eomi, mmm  Neck: no bruits or jvd noted  Cardiovascular:  S1, S2 without m/r/g  Respiratory: CTA B without w/r/r  Abdomen:  +bs, soft, nt, nd  Ext: no lower extremity edema  Psychiatric: mood and affect appropriate  Musculoskeletal:  Rom, muscular strength intact    Data:   Labs:  CBC:   Recent Labs     02/25/21 0446 02/25/21 0446 02/26/21  0145 02/26/21  0719 02/27/21  1148   WBC 8.4  --   --   --   --    HGB 8.2*   < > 7.9* 8.2* 7.5*     --   --   --   --     < > = values in this interval not displayed. BMP:    Recent Labs     02/25/21  0446 02/26/21  0719 02/27/21  1148    138 138   K 5.2* 4.8 4.6    103 105   CO2 24 25 23   BUN 63* 57* 52*   CREATININE 4.0* 4.2* 3.7*   GLUCOSE 136* 104* 101*     Ca/Mg/Phos:   Recent Labs     02/25/21  0446 02/26/21  0719 02/27/21  1148   CALCIUM 9.3 8.9 8.6     Hepatic:   No results for input(s): AST, ALT, ALB, BILITOT, ALKPHOS in the last 72 hours.   Troponin:   Recent Labs 02/25/21  0446   TROPONINI 0.28*     BNP: No results for input(s): BNP in the last 72 hours. Lipids: No results for input(s): CHOL, TRIG, HDL, LDLCALC, LABVLDL in the last 72 hours. ABGs: No results for input(s): PHART, PO2ART, [id number] in the last 72 hours. INR:   No results for input(s): INR in the last 72 hours. UA:  Recent Labs     02/25/21 0148   COLORU RED*   CLARITYU TURBID*   GLUCOSEU Negative   BILIRUBINUR Negative   KETUA TRACE*   SPECGRAV 1.020   BLOODU LARGE*   PHUR 5.0   PROTEINU >=300*   UROBILINOGEN 1.0   NITRU POSITIVE*   LEUKOCYTESUR SMALL*   LABMICR YES   URINETYPE NotGiven      Urine Microscopic:   Recent Labs     02/25/21 0148   BACTERIA Rare*   WBCUA see below*   RBCUA >100*     Urine Culture: No results for input(s): LABURIN in the last 72 hours. Urine Chemistry:   Recent Labs     02/25/21 0148   LABCREA 61.0   NAUR 70             IMAGING:  US RENAL COMPLETE   Final Result   Negative hydronephrosis. No perinephric fluid collections. Bladder poorly distended not well visualized. Please correlate exam findings. XR CHEST PORTABLE   Final Result   Patchy nodular opacities are noted in the right mid and lower lung which may   reflect airspace disease in the correct clinical setting. Malignancy cannot   be excluded. Further evaluation with chest CT is recommended. Assessment/Plan       1.  Acute renal failure on CKD stage 4      Baseline serum cr as per chart review ~ 2.9       Serum cr on admission 4.1      Etiology seems to be combination of pre renal  renal hypoperfusion due to GI bleed, intravascular   volume depletion       ATN component due to hypotension       Plan      Renal fn improving        Encourage PO intake of fluids to thirst        Avoid volume overload          Renal US  : no hydronphrosis      Avoid contrast    Keep MAP > 65 mmhg    Medication dose as per GFR    Avoid ACEI or ARB      2 Hyperkalemia in setting of ANGEL and acidosis  : improved Got temporizing measure in ED : ca gluconate + dextrose + insulin      Continue patiromer as per home dose      3 Non anion gap metabolic acidosis : improved          4.  GI bleed   PPI   transfuse prn     S/p EGD    5 Anemia due to GI bleed                   Thank you for allowing us to participate in care of [address] free to contact me   [facility] [address]  Office : [phone]  Fax :[phone]

## 2021-02-27 NOTE — PROGRESS NOTES
[facility]     Cardiology                                     Progress Note    Admission date:  2021    Reason for follow up visit: elevated troponin    HPI/CC: Taryn Perdue was admitted on 2021 with rectal bleeding (H&H 6.0/18.6). Was recently discharged from hospital in [facility] for PNA and had reported traumatic jose insertion. Has also been treated for ANGEL and elevated troponin. Echo 2021 showed an EF of 55-60% and severe pulmonary HTN. Rhythm has been AP-. Subjective: He is drowsy, received Seroquel overnight. Unable to assess. Vitals:  Blood pressure (!) 104/55, pulse 57, temperature 97.3 °F (36.3 °C), resp. rate 16, height 5' 5\" (1.651 m), weight 130 lb 1.1 oz (59 kg), SpO2 97 %.   Temp  Av.5 °F (36.4 °C)  Min: 97.3 °F (36.3 °C)  Max: 97.8 °F (36.6 °C)  Pulse  Av.7  Min: 57  Max: 77  BP  Min: 104/55  Max: 133/68  SpO2  Av.6 %  Min: 93 %  Max: 99 %    24 hour I/O    Intake/Output Summary (Last 24 hours) at 2021 1249  Last data filed at 2021 0536  Gross per 24 hour   Intake 804 ml   Output 1375 ml   Net -571 ml     Current Facility-Administered Medications   Medication Dose Route Frequency Provider Last Rate Last Bharti  ON 3/1/2021] patiromer sorbitex calcium (VELTASSA) packet 8.4 g  8.4 g Oral Q MWF Mauricio Hines MD        metoprolol succinate (TOPROL XL) extended release tablet 12.5 mg  12.5 mg Oral Daily Lizzie Marquez MD        ipratropium (ATROVENT) 0.02 % nebulizer solution 0.5 mg  0.5 mg Nebulization Q6H PRN Lizzie Marquez MD   0.5 mg at 21 8763    levothyroxine (SYNTHROID) tablet 100 mcg  100 mcg Oral Daily Lizzie Marquez MD   100 mcg at 21 0586    mupirocin (BACTROBAN) 2 % ointment   Nasal BID Lizzie Marquez MD   Given at 21    pantoprazole (PROTONIX) tablet 40 mg  40 mg Oral [facility] Rachel Bull MD   40 mg at 21 0020  0.9 % sodium chloride infusion   Intravenous Continuous Charles North MD   Stopped at 02/27/21 0734    0.9 % sodium chloride infusion   Intravenous PRN NAOMI Lane        0.9 % sodium chloride infusion   Intravenous PRN NAOMI Salas        sodium chloride flush 0.9 % injection 10 mL  10 mL Intravenous 2 times per day Jarret Ma MD   10 mL at 02/26/21 1017    sodium chloride flush 0.9 % injection 10 mL  10 mL Intravenous PRN Jarret Ma MD        promethazine (PHENERGAN) tablet 12.5 mg  12.5 mg Oral Q6H PRN Jarret Ma MD        Or    ondansetron [facility]) injection 4 mg  4 mg Intravenous Q6H PRN Jarret Ma MD        acetaminophen (TYLENOL) tablet 650 mg  650 mg Oral Q6H PRN Jarret Ma MD   650 mg at 02/26/21 2005    Or    acetaminophen (TYLENOL) suppository 650 mg  650 mg Rectal Q6H PRN Jarret Ma MD        fluticasone (FLONASE) 50 MCG/ACT nasal spray 1 spray  1 spray Each Nostril Daily Jarret Ma MD   1 spray at 02/26/21 1017    atorvastatin (LIPITOR) tablet 40 mg  40 mg Oral Nightly Nato Dempsey MD   40 mg at 02/26/21 2007    perflutren lipid microspheres (DEFINITY) injection 1.65 mg  1.5 mL Intravenous ONCE PRN Nato Dempsey MD        doxycycline (VIBRAMYCIN) 100 mg in dextrose 5 % 100 mL IVPB  100 mg Intravenous Q12H Jarret Ma MD   Stopped at 02/27/21 8778    cefTRIAXone (ROCEPHIN) 1000 mg IVPB in 50 mL D5W minibag  1,000 mg Intravenous Q24H Jarret Ma MD   Stopped at 02/26/21 1917    morphine (PF) injection 2 mg  2 mg Intravenous Q3H PRN Rhea Calderon MD   2 mg at 02/24/21 7200       Objective:     Telemetry monitor: AP-    Physical Exam:  Constitutional and general appearance: Fatigued, cooperative, no distress, appears stated age and pale; + frail   HEENT: PERRL, no cervical lymphadenopathy. No masses palpable.  Normal oral mucosa  Respiratory: · Normal excursion and expansion without use of accessory muscles  · Resp auscultation: Normal breath sounds without wheezing, rhonchi, and rales  Cardiovascular:  · The apical impulse is not displaced  · Heart tones are crisp and normal. regular S1 and S2.  · Jugular venous pulsation elevated  · The carotid upstroke is normal in amplitude and contour without delay or bruit  · Peripheral pulses are symmetrical and full   Abdomen:  · No masses or tenderness  · Bowel sounds present  Extremities:  ·  No cyanosis or clubbing  ·  No lower extremity edema but + evidence of PVD  ·  Skin: warm and dry  Neurological:  · Drowsy   · Unable to assess    Data    Echo 2/26/2021:  Technically difficult examination. Normal left ventricular systolic function with ejection fraction of 55-60%. No regional wall motion abnormalites are seen. Mild concentric left ventricular hypertrophy. Grade I diastolic dysfunction with normal filling pressure. Moderate mitral and tricuspid regurgitation. The left atrium is mildly dilated. Moderate aortic stenosis  Mild aortic regurgitation. Systolic pulmonic artery pressure (SPAP) is estimated at 75 mmHg consistent with severe pulmonary hypertension (Right atrial pressure of 15 mmHg). All labs and testing reviewed.   Lab Review     Renal Profile:   Lab Results   Component Value Date    CREATININE 3.7 02/27/2021    BUN 52 02/27/2021     02/27/2021    K 4.6 02/27/2021     02/27/2021    CO2 23 02/27/2021     CBC:    Lab Results   Component Value Date    WBC 8.4 02/25/2021    RBC 2.59 02/25/2021    HGB 7.5 02/27/2021    HCT 22.4 02/27/2021    MCV 93.8 02/25/2021    RDW 21.3 02/25/2021     02/25/2021     BNP:  No results found for: BNP  Fasting Lipid Panel:  No results found for: CHOL, HDL, TRIG  Cardiac Enzymes:  CK/MbTroponin  Lab Results   Component Value Date    TROPONINI 0.28 02/25/2021     PT/ INR   Lab Results   Component Value Date    INR 1.09 02/24/2021

## 2021-02-27 NOTE — PROGRESS NOTES
-Gross hematuria due to Jose trauma--improving patient has pulled out jose several times-     -CAD s/p CABG-continue current treatment    -Essential HTN-stable    -History of bradycardia s/p PPM    -Elevated troponin, likely type II NSTEMI in setting of GIB-conservative management per cardiology. ECHO EF 55-60%, mod AS, no wall motion abnormalities. Continue beta-blocker, statin, no asa given GIB     -Recent PNA---antibiotics started prior to submission to be stopped today--on IV Rocephin and doxycycline     -Acute delirium--increased confusion and agitation at night-pulled out Jose catheter    Disposition--- family wishes to take patient home with home health/24-hour caregiver. ..  Plan on discharge once cleared by nephrology--continuing to monitor renal function      DVT Prophylaxis: SCD  Diet: DIET CARDIAC;  Code Status: Limited        Pako Wilkes MD

## 2021-02-28 NOTE — DISCHARGE INSTR - COC
Continuity of Care Form    Patient Name: Jak Carmichael   :  1925  MRN:  [id number]    Admit date:  2021  Discharge date: 21     Code Status Order: Limited   Advance Directives:      Admitting Physician:  Celine Vanegas MD  PCP: Ashanti Can MD    Discharging Nurse: [facility] Unit/Room#: 3644/1269-09  Discharging Unit Phone Number: [phone]    Emergency Contact:   Extended Emergency Contact Information  Primary Emergency Contact: Clay Funez Phone: [phone]  Mobile Phone: [phone]  Relation: Child  Secondary Emergency Contact: [address] [place] [address] Phone: [phone]  Mobile Phone: [phone]  Relation: Child    Past Surgical History:  History reviewed. No pertinent surgical history. Immunization History: There is no immunization history on file for this patient.     Active Problems:  Patient Active Problem List   Diagnosis Code    Gastrointestinal hemorrhage K92.2    Multiple gastric ulcers K25.9    Duodenal bulb ulcer K26.9    Elevated troponin R77.8    Coronary artery disease involving native coronary artery of native heart without angina pectoris I25.10    Essential hypertension I10    Pulmonary HTN (HCC) I27.20       Isolation/Infection:   Isolation            No Isolation          Patient Infection Status       None to display            Nurse Assessment:  Last Vital Signs: BP (!) 124/57   Pulse 62   Temp 98.5 °F (36.9 °C) (Oral)   Resp 16   Ht 5' 5\" (1.651 m)   Wt 130 lb 1.1 oz (59 kg)   SpO2 96%   BMI 21.64 kg/m²     Last documented pain score (0-10 scale): Pain Level: 0  Last Weight:   Wt Readings from Last 1 Encounters:   21 130 lb 1.1 oz (59 kg)     Mental Status:  disoriented and oriented    IV Access:  - None    Nursing Mobility/ADLs:  Walking   Assisted  Transfer  Assisted  Bathing  Assisted  Dressing  Assisted  Toileting  Assisted  Feeding  Independent  Med Admin  Assisted Med Delivery   whole and prefers mixed with pudding/applesauce    Wound Care Documentation and Therapy:        Elimination:  Continence:   · Bowel: Yes  · Bladder: Yes  Urinary Catheter: Insertion Date: 2/28/21   Colostomy/Ileostomy/Ileal Conduit: No       Date of Last BM:     Intake/Output Summary (Last 24 hours) at 2/28/2021 1233  Last data filed at 2/28/2021 0925  Gross per 24 hour   Intake 1000 ml   Output 1125 ml   Net -125 ml     I/O last 3 completed shifts: In: 760 [P.O.:760]  Out: [address] [[id number]]    Safety Concerns: At Risk for Falls    Impairments/Disabilities:      None    Nutrition Therapy:  Current Nutrition Therapy:   - Oral Diet:  Cardiac    Routes of Feeding: Oral  Liquids: No Restrictions  Daily Fluid Restriction: no  Last Modified Barium Swallow with Video (Video Swallowing Test): not done    Treatments at the Time of Hospital Discharge:   Respiratory Treatments:   Oxygen Therapy:  is not on home oxygen therapy. Ventilator:    - No ventilator support    Rehab Therapies: Physical Therapy and Occupational Therapy  Weight Bearing Status/Restrictions: No weight bearing restirctions  Other Medical Equipment (for information only, NOT a DME order):     Other Treatments:     Patient's personal belongings (please select all that are sent with patient):      RN SIGNATURE:  Electronically signed by Mariola Edwards RN on 3/1/21 at 2:09 PM EST    CASE MANAGEMENT/SOCIAL WORK SECTION    Inpatient Status Date: 2/24/21    Readmission Risk Assessment Score:  Readmission Risk              Risk of Unplanned Readmission:        16           Discharging to Facility/ Agency   · Name: [address] [place] [address]  · Address:  · Phone: [phone]  · Fax: [phone]    / signature: Electronically signed by Salazar Polanco RN on 3/1/2021 at 12:24 PM      PHYSICIAN SECTION    Prognosis: Fair    Condition at Discharge: Stable Recommended Labs or Other Treatments After Discharge: Home PT/OT/jose care. Check CBC in one week. Physician Certification: I certify the above information and transfer of Joel Wiley  is necessary for the continuing treatment of the diagnosis listed and that he requires [address] for greater 30 days.      Update Admission H&P: No change in H&P    PHYSICIAN SIGNATURE:  Electronically signed by ALDO Donnelly CNP on 3/1/2021 at 11:47 AM

## 2021-02-28 NOTE — PROGRESS NOTES
Nephrology Progress  Note                                                                                                                                                                                                                                                                                                                                                               Office : [phone]     Fax :[phone]              Patient's Name: Kalia Mayo  2:43 PM  2/28/2021    Reason for Consult: ANGEL    Requesting Physician:  Agata Eubanks MD      Chief Complaint:  Anemia, GI bleed     History of Present Ilness:    Kalia Mayo is a 80 y.o. male with PMH of PNA  ( recent hospitalization for PNA ) and was discharged from hospital on 2/18  H/o CKD stage 4 , was on Patiromer ( K binder )  , recently has moved from [place] to [facility], was brought in with c/o  GI bleeding    Onset last 2 days    Outside lab hb was < 6    Here in ED Hb  6, s/p 2 units of PRBC transfusion in ED    No diarrhea         Interval history :      Renal fn improving , cr 3.7     Mancuso removed today    Hyperkalemia improved    S/p EGD             History reviewed. No pertinent past medical history. History reviewed. No pertinent surgical history. History reviewed. No pertinent family history.          Allergies:  Albuterol    Current Medications:        [START ON 3/1/2021] patiromer sorbitex calcium (VELTASSA) packet 8.4 g, Q MWF      metoprolol succinate (TOPROL XL) extended release tablet 12.5 mg, Daily      ipratropium (ATROVENT) 0.02 % nebulizer solution 0.5 mg, Q6H PRN      levothyroxine (SYNTHROID) tablet 100 mcg, Daily      pantoprazole (PROTONIX) tablet 40 mg, QAM AC      0.9 % sodium chloride infusion, Continuous      0.9 % sodium chloride infusion, PRN      0.9 % sodium chloride infusion, PRN      sodium chloride flush 0.9 % injection 10 mL, 2 times per day      sodium chloride flush 0.9 % injection 10 mL, PRN      promethazine (PHENERGAN) tablet 12.5 mg, Q6H PRN    Or      ondansetron (ZOFRAN) injection 4 mg, Q6H PRN      acetaminophen (TYLENOL) tablet 650 mg, Q6H PRN    Or      acetaminophen (TYLENOL) suppository 650 mg, Q6H PRN      fluticasone (FLONASE) 50 MCG/ACT nasal spray 1 spray, Daily      atorvastatin (LIPITOR) tablet 40 mg, Nightly      perflutren lipid microspheres (DEFINITY) injection 1.65 mg, ONCE PRN      morphine (PF) injection 2 mg, Q3H PRN        Review of Systems:   14 point ROS obtained but were negative except mentioned in HPI      Physical exam:     Vitals:  BP (!) 124/57   Pulse 62   Temp 98.5 °F (36.9 °C) (Oral)   Resp 16   Ht 5' 5\" (1.651 m)   Wt 130 lb 1.1 oz (59 kg)   SpO2 96%   BMI 21.64 kg/m²   Constitutional:  OAA X3 NAD  Skin: no rash, turgor wnl  Heent:  eomi, mmm  Neck: no bruits or jvd noted  Cardiovascular:  S1, S2 without m/r/g  Respiratory: CTA B without w/r/r  Abdomen:  +bs, soft, nt, nd  Ext: no lower extremity edema  Psychiatric: mood and affect appropriate  Musculoskeletal:  Rom, muscular strength intact    Data:   Labs:  CBC:   Recent Labs     02/26/21  0719 02/27/21  1148 02/28/21  0812   HGB 8.2* 7.5* 8.3*     BMP:    Recent Labs     02/26/21  0719 02/27/21  1148 02/28/21  0812    138 136   K 4.8 4.6 4.8    105 104   CO2 25 23 23   BUN 57* 52* 50*   CREATININE 4.2* 3.7* 3.7*   GLUCOSE 104* 101* 91     Ca/Mg/Phos:   Recent Labs     02/26/21  0719 02/27/21  1148 02/28/21  0812   CALCIUM 8.9 8.6 8.6     Hepatic:   No results for input(s): AST, ALT, ALB, BILITOT, ALKPHOS in the last 72 hours. Troponin:   No results for input(s): TROPONINI in the last 72 hours. BNP: No results for input(s): BNP in the last 72 hours. Lipids: No results for input(s): CHOL, TRIG, HDL, LDLCALC, LABVLDL in the last 72 hours. ABGs: No results for input(s): PHART, PO2ART, LAS8YCQ in the last 72 hours. INR:   No results for input(s): INR in the last 72 hours.   UA: No results for input(s): Heather Perks, GLUCOSEU, BILIRUBINUR, Chuyita Bring, BLOODU, PHUR, PROTEINU, UROBILINOGEN, NITRU, LEUKOCYTESUR, LABMICR, URINETYPE in the last 72 hours. Urine Microscopic:   No results for input(s): LABCAST, BACTERIA, COMU, HYALCAST, WBCUA, RBCUA, EPIU in the last 72 hours. Urine Culture: No results for input(s): LABURIN in the last 72 hours. Urine Chemistry:   No results for input(s): Jose [place], PROTEINUR, NAUR in the last 72 hours. IMAGING:  US RENAL COMPLETE   Final Result   Negative hydronephrosis. No perinephric fluid collections. Bladder poorly distended not well visualized. Please correlate exam findings. XR CHEST PORTABLE   Final Result   Patchy nodular opacities are noted in the right mid and lower lung which may   reflect airspace disease in the correct clinical setting. Malignancy cannot   be excluded. Further evaluation with chest CT is recommended. Assessment/Plan       1. Acute renal failure on CKD stage 4      Baseline serum cr as per chart review ~ 2.9       Serum cr on admission 4.1      Etiology seems to be combination of pre renal  renal hypoperfusion due to GI bleed, intravascular   volume depletion       ATN component due to hypotension       Plan    Stable from renal standpoint for discharge    Follow up in kidney clinic with BMP lab in 2 weeks            Encourage PO intake of fluids to thirst        Avoid volume overload          Renal US  : no hydronphrosis      Avoid contrast    Keep MAP > 65 mmhg    Medication dose as per GFR    Avoid ACEI or ARB      2 Hyperkalemia in setting of ANGEL and acidosis  : improved      Got temporizing measure in ED : ca gluconate + dextrose + insulin      Continue patiromer as per home dose      3 Non anion gap metabolic acidosis : improved          4.  GI bleed   PPI   transfuse prn     S/p EGD    5 Anemia due to GI bleed                   Thank you for allowing us to participate in care

## 2021-02-28 NOTE — DISCHARGE SUMMARY
Hospital Discharge Summary    Patient's PCP: Rebekah Rucker MD  Admit Date: 2/24/2021   Discharge Date: 2/28/2021    Admitting Physician: Dr. Misti Fuentes MD  Discharge Physician: Dr. Eladio Lambshashi   Consults: cardiology, GI, nephrology and urology    Brief HPI/hospital course:     80 y.o. male  presented to [facility] with melena and lethargy. Of note patient was admitted to an outside hospital in [facility] and discharged on 2/17 for pneumonia. During his stay he had a traumatic Jose placement with 3 resultant hematuria requiring an extended stay. His Eliquis was stopped at this time and has not been continued since then. According to report his hemoglobin was 7.9 at time of discharge from [facility] on 2/17. In the emergency room he was noted to have a hemoglobin of 6.0 with some mild hypotension. 2 units of packed red blood cells were initiated in the emergency room. GI was consulted and patient started on PPI drip. He was also initiated on treatment for hyperkalemia. He was treated for the following:    -Acute GI bleed  EGD 2/26/21 -normal esophagus, few nonbleeding ulcers in the antrum, mild gastritis, official ulcer in the duodenal sweep likely etiology of GIB no active bleeding seen. On PPI -      -Acute blood loss anemia. Hb 6.0 on admission. s/p 2 unit prbc in ED. h/h remains stable   -Lactic acidosis- resolved      -Urinary retention, probable BPH- urology c/s - jose to remain in place, f/u OP       -ANGEL on CKD. creatinine at baseline 2.9, creatinine is now slowly improving, down to 3.7-was persistently above 4 for several days. . On IV fluids     -Hyperkalemia-resolved. s/p insulin, albuterol, calcium gluconate in the ED  -Hypothyroidism. TSH elevated. Synthroid dose was increased     - Hx A flutter. . On beta-blocker. In sinus rhythm. was on Eliuis - has been off since last hospitalization 2/2 tramatic hematura      -Gross hematuria due to Jose trauma--improving patient has pulled out jose several times-      -CAD s/p CABG-continue current treatment     -Essential HTN-stable     -History of bradycardia s/p PPM     -Elevated troponin, likely type II NSTEMI in setting of GIB-conservative management per cardiology. ECHO EF 55-60%, mod AS, no wall motion abnormalities. Continue beta-blocker, statin, no asa given GIB      -Recent PNA---antibiotics started prior to submission to be stopped today--on IV Rocephin and doxycycline     -Acute delirium--increased confusion and agitation -pulled out Jose catheter       Invasive procedures:  EGD    Discharge Diagnoses: Active Problems:    Gastrointestinal hemorrhage    Multiple gastric ulcers    Duodenal bulb ulcer    Elevated troponin    Coronary artery disease involving native coronary artery of native heart without angina pectoris    Essential hypertension    Pulmonary HTN ([facility] [address]  Resolved Problems:    * No resolved hospital problems. *      Physical Exam: BP (!) 124/57   Pulse 62   Temp 98.5 °F (36.9 °C) (Oral)   Resp 16   Ht 5' 5\" (1.651 m)   Wt 130 lb 1.1 oz (59 kg)   SpO2 96%   BMI 21.64 kg/m²   Gen/overall appearance: Not in acute distress. Alert. Head: Normocephalic, atraumatic  Eyes: EOMI, good acuity  ENT:- Oral mucosa moist  Neck: No JVD, thyromegaly  CVS: Nml S1S2, no MRG, RRR  Pulm: Clear bilaterally. No crackles/wheezes  Gastrointestinal: Soft, NT/ND, +BS  Musculoskeletal: No edema. Warm  Neuro: No focal deficit. Moves extremity spontaneously. Psychiatry: Appropriate affect. Not agitated. Skin: Warm, dry with normal turgor. No rash        Significant Diagnostic Studies:    See above        Treatments: As above.       Discharge Medications:     Medication List      CONTINUE taking these medications    coenzyme Q-10 100 MG capsule     dextromethorphan-guaiFENesin  MG per extended release tablet  Commonly known as: MUCINEX DM     Flaxseed Oil 1000 MG Caps     fluticasone 50 MCG/ACT nasal spray  Commonly known as: FLONASE folic acid 1 MG tablet  Commonly known as: FOLVITE     ipratropium-albuterol 0.5-2.5 (3) MG/3ML Soln nebulizer solution  Commonly known as: DUONEB     isosorbide mononitrate 30 MG extended release tablet  Commonly known as: IMDUR     levothyroxine 88 MCG tablet  Commonly known as: SYNTHROID     metoprolol succinate 25 MG extended release tablet  Commonly known as: TOPROL XL     omeprazole 20 MG delayed release capsule  Commonly known as: PRILOSEC     Veltassa 8.4 g Pack packet  Generic drug: patiromer sorbitex calcium     vitamin C 250 MG tablet            Activity: activity as tolerated  Diet: DIET CARDIAC; Disposition: home  Discharged Condition: Stable  Follow Up:   Taniya Corey MD  [address] [facility]  [phone]      1-2 weeks    [address] [place] [address] 90  [facility]  [address], MD  [address] [place] [address]    In 1 week      Felix Meraz MD  [address] [place] [address]  [phone]    In 2 weeks          Code status:  Limited         Total time spent on discharge, finalizing medications, referrals and arranging outpatient follow up was more than 45 minutes      Thank you Dr. Lia Ivory MD for the opportunity to be involved in this patients care.

## 2021-02-28 NOTE — CARE COORDINATION
CASE MANAGEMENT DISCHARGE SUMMARY      Discharge to: [facility] with [address]    Transportation:    Family/car: yes     Confirmed discharge plan with:     Patient: yes per RN     Family, name and contact number: Dtzachary Piña [phone]   Facility/Agency, name: [employer] faxed      RN, name: Charlie Walters RN    Note: Discharging nurse to complete VERITO, reconcile AVS, and place final copy with patient's discharge packet. RN to ensure that written prescriptions for  Level II medications are sent with patient to the facility as per protocol.

## 2021-03-01 NOTE — PROGRESS NOTES
Pt daughter Reshma Bahena called RN supervisor stating that she was unhappy with the care her father was receiving. RN floor staff report multiple calls from daughter and have tried to assure her that pt is receiving appropriate care. Daughter has also spoken with pt on the phone. She expressed that she is very worried about pt not voiding s/p catheter removal and requesting to come to bedside. Explained that she would not be able to enter hospital at this time due to visitor restrictions- provided visiting hours information. She verbalized understanding. Discussed plan with daughter to: 1) Assign different RN to pt care; 2) Contact NP for order for bladder scan; 3) Physically assist pt to sit on toilet so that he may attempt to void on his own. Informed daughter that this writer would call back with update. Charge RN updated on plan.

## 2021-03-01 NOTE — CARE COORDINATION
CM noted discharge order. Weekend CM, Kimberly, documented that pt discharged to [facility] with [address]. After chart review, CM notes pt did not discharge d/t urinary retention. Nursing staff had multiple conversations with family/daughter, who it appears does not want pt to receive jose catheter. CM team will follow today and assist with discharge arrangements as needed.

## 2021-03-01 NOTE — PROGRESS NOTES
DC instructions given to daughter Julio Donahue aware to  2 RX located in OP pharmacy. Deanne Patton aware of f/u appts, and one week lab draw. Belongings gathered and taken. Lockbox emptied. Taken to car via wheelchair.

## 2021-03-01 NOTE — CARE COORDINATION
CASE MANAGEMENT DISCHARGE SUMMARY      Discharge to: [facility]    [facility] Equipment ordered/agency: [facility]     Transportation:    Family/car: daughterKye    Confirmed discharge plan with:     Patient: yes     Family, name and contact number: daughterKye, at bedside     Facility/Agency, name:  [facility] faxed to [facility] and [facility], Mason Barr, notified     RN, name: Aguila Light    Note: Discharging nurse to complete VERITO, reconcile AVS, and place final copy with patient's discharge packet. RN to ensure that written prescriptions for  Level II medications are sent with patient to the facility as per protocol.     Gabriele Cleaning RN

## 2021-03-01 NOTE — PROGRESS NOTES
Urology Attending Progress Note      Subjective: feeling better, alert    Vitals:  /61   Pulse 60   Temp 97.6 °F (36.4 °C) (Oral)   Resp 16   Ht 5' 5\" (1.651 m)   Wt 130 lb 1.1 oz (59 kg)   SpO2 98%   BMI 21.64 kg/m²   Temp  Av °F (36.7 °C)  Min: 97.6 °F (36.4 °C)  Max: 98.5 °F (36.9 °C)    Intake/Output Summary (Last 24 hours) at 3/1/2021 0934  Last data filed at 3/1/2021 9496  Gross per 24 hour   Intake 360 ml   Output 1000 ml   Net -640 ml       Exam: urine clear    Labs:  WBC:    Lab Results   Component Value Date    WBC 8.4 2021     Hemoglobin/Hematocrit:    Lab Results   Component Value Date    HGB 8.1 2021    HCT 24.2 2021     BMP:    Lab Results   Component Value Date     2021    K 4.8 2021     2021    CO2 23 2021    BUN 50 2021    LABALBU 3.7 2021    CREATININE 3.7 2021    CALCIUM 8.6 2021    GFRAA 19 2021    LABGLOM 15 2021     PT/INR:    Lab Results   Component Value Date    PROTIME 12.6 2021    INR 1.09 2021     PTT:    Lab Results   Component Value Date    APTT 30.1 2021   [APTT    Urinalysis:     Urine Culture:      Blood Culture:      Antibiotic Therapy:      Imaging:       Impression/Plan: creatinine stable. I have recommended pt be d/c with jose given the amount of trauma he had with pulling catheter out 3 times on Friday night. Daughter insisted voiding trial, he failed, jose catheter replaced. Discussed with daughter that when patient is up and moving around more can have VT outpatient, and she agreed.    f/u our group 1-3 weeks   -we discussed a little about possibility of chronic jose     Xena Perkins PA-C

## 2021-03-01 NOTE — PROGRESS NOTES
This nurse received a call from shital Da Silva shortly after shift change yesterday evening. Daughter wanted an update on pt status. It was reported that pt had been up to commode for two bowel movements by day shift RN but had failed to urinate after jose was removed around two PM.  Pt was encouraged to drink fluids but refused to drink at the time. Daughter then began to demand that the last time this happened hospital staff had to repeatedly transfer pt to commode and urinated approximately 10 hours after jose removal.  It was understood by this nurse that some patients are more comfortable with using commode vs. urinal and stated that as soon as eyes were laid on all pts, we would encourage pt to move to commode to try to void. Prior to this happening patient was waxing and waning with orientation and refused to get up to commode or use urinal and that he was \"89 and he didn't want to do this anymore\" and started to become anxious. This was explained to shital Da Silva who started to become verbally aggressive stating that \"my father would never say anything like that if you knew him\". This RN stated that he may not normally say these expressions but that he was very frustrated that he could not urinate and thought letting him relax for a while might encourage him to try to void again and it was reiterated that while this RN can encourage pt to go to commode, it was unethical to force him if he refused. Daughter proceeded to demand that she speak with father and daughter was called from pt room phone. Pt calm at this time and already on commode trying to urinate. This RN could hear daughter asking orientation questions to father, all of which he replied wrong, this including his age and date of birth.   Daughter ended call and proceeded to call nurses direct phone, and nurses station multiple times, all times stated to daughter that pt had been up to commode three times but was still unable to void and

## 2021-03-02 NOTE — PROGRESS NOTES
Cardiovascular:  S1, S2 without m/r/g  Respiratory: CTA B without w/r/r  Abdomen:  +bs, soft, nt, nd  Ext: no lower extremity edema  Psychiatric: mood and affect appropriate  Musculoskeletal:  Rom, muscular strength intact    Data:   Labs:  CBC:   Recent Labs     02/27/21  1148 02/28/21  0812 03/01/21  0908   HGB 7.5* 8.3* 8.1*     BMP:    Recent Labs     02/27/21  1148 02/28/21  0812 03/01/21  0908    136 137   K 4.6 4.8 4.9    104 104   CO2 23 23 22   BUN 52* 50* 42*   CREATININE 3.7* 3.7* 3.1*   GLUCOSE 101* 91 92     Ca/Mg/Phos:   Recent Labs     02/27/21  1148 02/28/21  0812 03/01/21  0908   CALCIUM 8.6 8.6 8.8     Hepatic:   No results for input(s): AST, ALT, ALB, BILITOT, ALKPHOS in the last 72 hours. Troponin:   No results for input(s): TROPONINI in the last 72 hours. BNP: No results for input(s): BNP in the last 72 hours. Lipids: No results for input(s): CHOL, TRIG, HDL, LDLCALC, LABVLDL in the last 72 hours. ABGs: No results for input(s): PHART, PO2ART, VLA8JBU in the last 72 hours. INR:   No results for input(s): INR in the last 72 hours. UA:  No results for input(s): Aria Lacrosse, GLUCOSEU, BILIRUBINUR, KETUA, SPECGRAV, BLOODU, PHUR, PROTEINU, UROBILINOGEN, NITRU, LEUKOCYTESUR, Taryn Knuckles in the last 72 hours. Urine Microscopic:   No results for input(s): LABCAST, BACTERIA, COMU, HYALCAST, WBCUA, RBCUA, EPIU in the last 72 hours. Urine Culture: No results for input(s): LABURIN in the last 72 hours. Urine Chemistry:   No results for input(s): Bharti Post, PROTEINUR, NAUR in the last 72 hours. IMAGING:  US RENAL COMPLETE   Final Result   Negative hydronephrosis. No perinephric fluid collections. Bladder poorly distended not well visualized. Please correlate exam findings. XR CHEST PORTABLE   Final Result   Patchy nodular opacities are noted in the right mid and lower lung which may   reflect airspace disease in the correct clinical setting. Malignancy cannot   be excluded. Further evaluation with chest CT is recommended. Assessment/Plan       1. Acute renal failure on CKD stage 4      Baseline serum cr as per chart review ~ 2.9       Serum cr on admission 4.1      Etiology seems to be combination of pre renal  renal hypoperfusion due to GI bleed, intravascular   volume depletion       ATN component due to hypotension       Plan    Stable from renal standpoint for discharge    Follow up in kidney clinic with BMP lab in 2 weeks            Encourage PO intake of fluids to thirst        Avoid volume overload          Renal US  : no hydronphrosis      Avoid contrast    Keep MAP > 65 mmhg    Medication dose as per GFR    Avoid ACEI or ARB      2 Hyperkalemia in setting of ANGEL and acidosis  : improved      Got temporizing measure in ED : ca gluconate + dextrose + insulin      Continue patiromer as per home dose      3 Non anion gap metabolic acidosis : improved          4.  GI bleed   PPI   transfuse prn     S/p EGD    5 Anemia due to GI bleed                   Thank you for allowing us to participate in care of [facility] free to contact me   [facility] [address]  Office : [phone]  Fax :[phone]

## 2021-03-02 NOTE — CARE COORDINATION
[facility] Transitions Initial Follow Up Call    Call within 2 business days of discharge: Yes    Patient: Jac Lake Patient : 1925   MRN: [id number]  Reason for Admission: GIB  Discharge Date: 3/1/21 RARS: Readmission Risk Score: 15      Last Discharge [facility]       Complaint Diagnosis Description Type Department Provider    21 Rectal Bleeding Gastrointestinal hemorrhage, unspecified gastrointestinal hemorrhage type . .. ED to Hosp-Admission (Discharged) (ADMITTED) Norma Wilkerson MD; Reinaldo Byrnes. .. Spoke with: Jac Lake and daughter      Facility: [facility]      Challenges to be reviewed by the provider   Additional needs identified to be addressed with provider No  none             Method of communication with provider : phone      Advance Care Planning:   Does patient have an Advance Directive:  not on file. Was this a readmission? No  Patients top risk factors for readmission: medical condition    Care Transition Nurse (CTN) contacted the family by telephone to perform post hospital discharge assessment. Verified name and  with family as identifiers. Provided introduction to self, and explanation of the CTN role. CTN reviewed discharge instructions, medical action plan and red flags with family who verbalized understanding. Family given an opportunity to ask questions and does not have any further questions or concerns at this time. Were discharge instructions available to patient? Yes. Reviewed appropriate site of care based on symptoms and resources available to patient including: PCP and Specialist. The family agrees to contact the PCP office for questions related to their healthcare. Medication reconciliation was performed with family, who verbalizes understanding of administration of home medications. Advised obtaining a 90-day supply of all daily and as-needed medications. Discussed follow-up appointments. If no appointment was previously scheduled, appointment scheduling offered: Yes. Is follow up appointment scheduled within 7 days of discharge? Yes    Plan for follow-up call in 7-10 days based on severity of symptoms and risk factors. Patient answered call and verified . Patient request that CTN speak to daughter. Patient feeling better and happy to be back home. Patient had home care nurse visit this morning. Mancuso catheter draining clearer urine and denied pain. Full medication list not completed, but new and changed medications reviewed. Daughter aware of purpose of medications. Patient lives at [facility] and has support when needed. Patient stable and denies any acute needs at present time. Educated on the use of urgent care or physicians 24 hr access line if assistance is needed after hours. CTN provided contact information for future needs.           Care Transitions 24 Hour Call    Do you have any ongoing symptoms?: No  Do you have a copy of your discharge instructions?: Yes  Do you have all of your prescriptions and are they filled?: Yes  Have you been contacted by a [id number] [facility] Pharmacist?: No  Have you scheduled your follow up appointment?: Yes  How are you going to get to your appointment?: Car - family or friend to transport  Were you discharged with any Home Care or Post Acute Services: Yes  Post Acute Services: [facility] you feel like you have everything you need to keep you well at home?: Yes  Care Transitions Interventions         Follow Up  Future Appointments   Date Time Provider Michael Bennett   3/22/2021  2:00 PM MD Mecca Cherry [address]

## 2021-03-22 NOTE — PROGRESS NOTES
Patient presents to the device clinic today for a programming evaluation for his pacemaker. Patient has a history of A Flutter. Takes Toprol XL. Per patient's daughter, last device interrogation in February at previous clinic. Since then, 1 NSVT event recorded on 2/11 x 6 seconds - V rate 168 bpm. Numerous AMS events recorded with burden 68%. AP 7%  40%    All sensing and pacing parameters are within normal range. RA output was increased to keep 2:1. Patient education was provided about device functionality, in home monitoring, and any other patient questions and/or concerns were addressed. Patient voices understanding. New bedside monitor was ordered for the patient. Please see interrogation for more detail. Patient will see Dr. Grayson Castillo today in office. Patient will follow up in 3 months in office or remotely. See [employer] report under the Cardiology tab.

## 2021-03-22 NOTE — PROGRESS NOTES
succinate (TOPROL XL) 25 MG extended release tablet Take 0.5 tablets by mouth daily 3/1/21  Yes ALDO Alicia CNP   pantoprazole (PROTONIX) 40 MG tablet Take 1 tablet by mouth every morning (before breakfast) 3/2/21  Yes ALDO Alicia CNP   ipratropium-albuterol (DUONEB) 0.5-2.5 (3) MG/3ML SOLN nebulizer solution Inhale 1 vial into the lungs every 4 hours   Yes Historical Provider, MD   levothyroxine (SYNTHROID) 88 MCG tablet Take 88 mcg by mouth Daily   Yes Historical Provider, MD   coenzyme Q-10 100 MG capsule Take 100 mg by mouth daily   Yes Historical Provider, MD   Flaxseed Linseed, (FLAXSEED OIL) 1000 MG CAPS Take by mouth   Yes Historical Provider, MD   fluticasone (FLONASE) 50 MCG/ACT nasal spray 1 spray by Each Nostril route daily   Yes Historical Provider, MD   folic acid (FOLVITE) 1 MG tablet Take 1 mg by mouth daily   Yes Historical Provider, MD   dextromethorphan-guaiFENesin (MUCINEX DM)  MG per extended release tablet Take 1 tablet by mouth every 12 hours as needed 1/2 tab daily   Yes Historical Provider, MD   Ascorbic Acid (VITAMIN C) 250 MG tablet Take 500 mg by mouth daily   Yes Historical Provider, MD   patiromer sorbitex calcium (VELTASSA) 8.4 g PACK packet Take 8.4 g by mouth daily 3 times a week    Historical Provider, MD        Allergies:  Albuterol     Review of Systems:   · Constitutional: there has been no unanticipated weight loss. There's been no change in energy level, sleep pattern, or activity level. · Eyes: No visual changes or diplopia. No scleral icterus. · ENT: No Headaches, hearing loss or vertigo. No mouth sores or sore throat. · Cardiovascular: Reviewed in HPI  · Respiratory: No cough or wheezing, no sputum production. No hematemesis. · Gastrointestinal: No abdominal pain, appetite loss, blood in stools. No change in bowel or bladder habits. · Genitourinary: No dysuria, trouble voiding, or hematuria.   · Musculoskeletal:  No gait disturbance, weakness or joint complaints. · Integumentary: No rash or pruritis. · Neurological: No headache, diplopia, change in muscle strength, numbness or tingling. No change in gait, balance, coordination, mood, affect, memory, mentation, behavior. · Psychiatric: No anxiety, no depression. · Endocrine: No malaise, fatigue or temperature intolerance. No excessive thirst, fluid intake, or urination. No tremor. · Hematologic/Lymphatic: No abnormal bruising or bleeding, blood clots or swollen lymph nodes. · Allergic/Immunologic: No nasal congestion or hives. Physical Examination:    Vitals:    03/22/21 1423   BP: 126/64   Pulse: 60   SpO2: 90%        Constitutional and General Appearance: NAD   Respiratory:  · Normal excursion and expansion without use of accessory muscles  · Resp Auscultation: Normal breath sounds without dullness  Cardiovascular:  · The apical impulses not displaced  · Heart tones are crisp and normal  · Cervical veins are not engorged  · The carotid upstroke is normal in amplitude and contour without delay or bruit  · Normal S1S2, No S3, No Murmur  · Peripheral pulses are symmetrical and full  · There is no clubbing, cyanosis of the extremities. · No edema  · Femoral Arteries: 2+ and equal  · Pedal Pulses: 2+ and equal   Abdomen:  · No masses or tenderness  · Liver/Spleen: No Abnormalities Noted  Neurological/Psychiatric:  · Alert and oriented in all spheres  · Moves all extremities well  · Exhibits normal gait balance and coordination  · No abnormalities of mood, affect, memory, mentation, or behavior are noted      EKG 2/24/2021  Baseline artifact  Sinus rhythm  intermittent ventricular pseudofusion      Echocardiogram 2/26/2021  Summary   Technically difficult examination. Normal left ventricular systolic function with ejection fraction of 55-60%. No regional wall motion abnormalites are seen. Mild concentric left ventricular hypertrophy.    Grade I diastolic dysfunction with normal filling pressure. Moderate mitral and tricuspid regurgitation. The left atrium is mildly dilated. Moderate aortic stenosis   Mild aortic regurgitation. Systolic pulmonic artery pressure (SPAP) is estimated at 75 mmHg consistent   with severe pulmonary hypertension (Right atrial pressure of 15 mmHg). Assessment:   H/O elev trop 2/2021 - due to acute illness including acute renal failure and anemia. H/O GIB  Atrial flutter and strial fib - stable. Recurrent episodes noted on PPM   Anemia  CKD  CAD - chronic ischemic heart dz  S/P CABG  HTN - controlled  PPM - ck reviewed today   HLD  Moderate aortic stenosis  Mild aortic regurgitation. PNA     Plan:  Recommend taking Aspirin 81 mg daily   Stop Imdur, no h/o angina   Discussed R/B AC - pt declines   Establish care with device clinic   Continue other medications   Check blood pressure at home weekly  Regular exercise and following a healthy diet encouraged   Follow up with me in 6 months   Follow up with PCP   Referral to pulmonary        Scribe's attestation: This note was scribed in the presence of Dr. Ramakrishna Brizuela M.D. By Chyrl Collet RN    The scribes documentation has been prepared under my direction and personally reviewed by me in its entirety. I confirm that the note above accurately reflects all work, treatment, procedures, and medical decision making performed by me. Dr. Ramakrishna Brizuela MD    Thank you for allowing me to participate in the care of this individual.      Arlyn Leger.  Sally Montiel M.D., Phani Zheng

## 2021-03-24 PROBLEM — Z95.0 PACEMAKER: Status: ACTIVE | Noted: 2021-01-01

## 2021-04-20 NOTE — Clinical Note
I had accidentally sent the Xopenex script to [facility] at first but then [facility] per [facility] request

## 2021-04-20 NOTE — PATIENT INSTRUCTIONS
Remember to bring a list of pulmonary medications and any CPAP or BiPAP machines to your next appointment with the office. Please keep all of your future appointments scheduled by [address] office. Out of respect for other patients and providers, you may be asked to reschedule your appointment if you arrive later than your scheduled appointment time. Appointments cancelled less than 24hrs in advance will be considered a no show. Patients with three missed appointments within 1 year or four missed appointments within 2 years can be dismissed from the practice. Please be aware that our physicians are required to work in the Intensive Care Unit at [facility].  Your appointment may need to be rescheduled if they are designated to work during your appointment time. You may receive a survey regarding the care you received during your visit. Your input is valuable to us. We encourage you to complete and return your survey. We hope you will choose us in the future for your healthcare needs. Pt instructed of all future appointment dates & times, including radiology, labs, procedures & referrals. If procedures were scheduled preparation instructions provided. Instructions on future appointments with [facility] were given.

## 2021-04-20 NOTE — PROGRESS NOTES
MA Communication: The following orders are received by verbal communication from Dr. Brian Ruiz. Orders include:      Modified Barium Swallow: Order faxed to Speech therapy. They will contact pt to schedule. Patient will call the office to schedule a follow up once testing is scheduled.
Psychiatric/Behavioral: Negative for agitation, confusion and hallucinations. Physical Exam  Constitutional:       General: He is not in acute distress. Appearance: He is well-developed. He is not diaphoretic. HENT:      Head: Normocephalic and atraumatic. Mouth/Throat:      Pharynx: No oropharyngeal exudate. Eyes:      Pupils: Pupils are equal, round, and reactive to light. Neck:      Musculoskeletal: Neck supple. Vascular: No JVD. Cardiovascular:      Heart sounds: Normal heart sounds. No murmur. No friction rub. No gallop. Pulmonary:      Effort: Pulmonary effort is normal.      Breath sounds: No wheezing or rales. Abdominal:      General: Bowel sounds are normal. There is no distension. Palpations: Abdomen is soft. Tenderness: There is no abdominal tenderness. Musculoskeletal: Normal range of motion. Lymphadenopathy:      Cervical: No cervical adenopathy. Skin:     General: Skin is warm and dry. Findings: No rash. Neurological:      Mental Status: He is alert. Cranial Nerves: No cranial nerve deficit. Comments: CN 2-12 grossly intact         ASSESSMENT:    1. Infiltrate noted on imaging study    2. Cough    3.  Pneumonia due to infectious organism, unspecified laterality, unspecified part of lung      PLAN:    -Repeat CXR  -Change to xopenex nebs  -Check modified barium swallow for silent aspiration  -Follow up after, further workup pending results and symptoms     Orders Placed This Encounter   Procedures    XR CHEST STANDARD (2 VW)    FL MODIFIED BARIUM SWALLOW Yaakov Nath MD

## 2021-04-23 NOTE — TELEPHONE ENCOUNTER
Patient may be microspirating in the lungs    Was MBS done ?     Can refer to speech therapist to evaluate the texture/consistency of food which may be best for the patient

## 2021-04-23 NOTE — TELEPHONE ENCOUNTER
Needs to take small bolus of food and chew thoroughly and take time to swallow   Be upright while eating and about atleast 45 minutes after eating

## 2021-04-23 NOTE — TELEPHONE ENCOUNTER
Meliton Ybarra (Daughter/POA) called to get results of CXR done on 4/20/21. She wants to make sure there is nothing else she should be doing to help her dad. Please advise. Impression   1. Interval development of new right-sided peripheral pulmonary opacities and   resolution of the previously seen right-sided pulmonary opacities.  These   findings favor an infectious or inflammatory process and clinical correlation   is recommended. 2. Trace bilateral pleural fluid.

## 2021-04-23 NOTE — TELEPHONE ENCOUNTER
Pt is scheduled for MBS on 4/27/21. Should we wait for those results until we proceed any further? Is there anything else she needs to be concerned about before that time (based on the CXR)?

## 2021-05-03 NOTE — TELEPHONE ENCOUNTER
Patient same day cancelled appt (no show) for MBS fu  with Dr. Rob Roy on 5/3/21. Reason:  Pt is sick and sleeping a lot right now    This is patient's first no show. Patient was a no show on: NA.      Patient did reschedule.   Reschedule date:  5/17/21

## 2021-05-05 PROBLEM — Z20.822 SUSPECTED COVID-19 VIRUS INFECTION: Status: ACTIVE | Noted: 2021-01-01

## 2021-05-05 PROBLEM — N18.4 CKD (CHRONIC KIDNEY DISEASE) STAGE 4, GFR 15-29 ML/MIN (HCC): Status: ACTIVE | Noted: 2021-01-01

## 2021-05-05 PROBLEM — J18.9 PNA (PNEUMONIA): Status: ACTIVE | Noted: 2021-01-01

## 2021-05-05 PROBLEM — I50.9 ACUTE CHF (CONGESTIVE HEART FAILURE) (HCC): Status: ACTIVE | Noted: 2021-01-01

## 2021-05-05 NOTE — ED PROVIDER NOTES
tablet Take 81 mg by mouth nightly      ciprofloxacin (CIPRO) 250 MG tablet       isosorbide mononitrate (IMDUR) 30 MG extended release tablet Take 30 mg by mouth daily      levalbuterol (XOPENEX) 1.25 MG/3ML nebulizer solution Take 3 mLs by nebulization every 4 hours as needed for Wheezing or Shortness of Breath Failed albuterol  Qty: 360 mL, Refills: 11      sodium zirconium cyclosilicate (LOKELMA) 10 g PACK oral suspension Take 10 g by mouth 4 time a week      atorvastatin (LIPITOR) 40 MG tablet Take 1 tablet by mouth nightly  Qty: 30 tablet, Refills: 3      metoprolol succinate (TOPROL XL) 25 MG extended release tablet Take 0.5 tablets by mouth daily  Qty: 30 tablet, Refills: 3      pantoprazole (PROTONIX) 40 MG tablet Take 1 tablet by mouth every morning (before breakfast)  Qty: 30 tablet, Refills: 3      ipratropium-albuterol (DUONEB) 0.5-2.5 (3) MG/3ML SOLN nebulizer solution Inhale 1 vial into the lungs every 4 hours      levothyroxine (SYNTHROID) 88 MCG tablet Take 88 mcg by mouth Daily      patiromer sorbitex calcium (VELTASSA) 8.4 g PACK packet Take 8.4 g by mouth daily 3 times a week      coenzyme Q-10 100 MG capsule Take 100 mg by mouth daily      Flaxseed, Linseed, (FLAXSEED OIL) 1000 MG CAPS Take by mouth      fluticasone (FLONASE) 50 MCG/ACT nasal spray 1 spray by Each Nostril route daily      folic acid (FOLVITE) 1 MG tablet Take 1 mg by mouth daily      dextromethorphan-guaiFENesin (MUCINEX DM)  MG per extended release tablet Take 1 tablet by mouth every 12 hours as needed 1/2 tab daily      Ascorbic Acid (VITAMIN C) 250 MG tablet Take 500 mg by mouth daily             ALLERGIES     Albuterol    FAMILY HISTORY     No family history on file.        SOCIAL HISTORY       Social History     Socioeconomic History    Marital status:      Spouse name: Not on file    Number of children: Not on file    Years of education: Not on file    Highest education level: Not on file   Occupational diffusely weak and physical is limited at this time, will not talk to me    HENT:      Head: Normocephalic and atraumatic. Right Ear: External ear normal.      Left Ear: External ear normal.      Nose: Nose normal.      Mouth/Throat:      Mouth: Mucous membranes are dry. Eyes:      Pupils: Pupils are equal, round, and reactive to light. Neck:      Musculoskeletal: Neck supple. No neck rigidity or muscular tenderness. Cardiovascular:      Rate and Rhythm: Normal rate and regular rhythm. Pulses: Normal pulses. Radial pulses are 2+ on the right side and 2+ on the left side. Pulmonary:      Effort: Pulmonary effort is normal. No respiratory distress. Breath sounds: No stridor. No wheezing, rhonchi or rales. Abdominal:      General: Abdomen is flat. Bowel sounds are normal. There is no distension. Palpations: Abdomen is soft. Tenderness: There is no abdominal tenderness. There is no guarding or rebound. Musculoskeletal:         General: No tenderness, deformity or signs of injury. Skin:     General: Skin is warm and dry. Capillary Refill: Capillary refill takes less than 2 seconds. Coloration: Skin is not jaundiced. Neurological:      General: No focal deficit present. Mental Status: He is easily aroused. He is lethargic. GCS: GCS eye subscore is 3. GCS verbal subscore is 2. GCS motor subscore is 6. Cranial Nerves: No facial asymmetry. Motor: Weakness present. Comments: 4-/5  strength bilaterally, not lifting arms bilaterally, unable to lift legs off bed to command    Psychiatric:         Attention and Perception: He is inattentive. Mood and Affect: Mood is depressed. Speech: He is noncommunicative. Behavior: Behavior is slowed and withdrawn. Behavior is cooperative.              DIAGNOSTIC RESULTS   :    Labs Reviewed   CBC WITH AUTO DIFFERENTIAL - Abnormal; Notable for the following components:       Result Value    RBC 3.32 (*)     Hemoglobin 10.4 (*)     Hematocrit 31.8 (*)     RDW 17.8 (*)     Lymphocytes Absolute 0.5 (*)     All other components within normal limits    Narrative:     Performed at:  [address], [facility] [place]   Phone [phone]   COMPREHENSIVE METABOLIC PANEL - Abnormal; Notable for the following components:    Chloride 98 (*)     Glucose 213 (*)     BUN 45 (*)     CREATININE 3.2 (*)     GFR Non- 18 (*)     GFR  22 (*)     Calcium 11.7 (*)     Albumin/Globulin Ratio 0.8 (*)     AST 38 (*)     All other components within normal limits    Narrative:     Performed at:  [place] [address] [place]   Phone [phone]   TROPONIN - Abnormal; Notable for the following components:    Troponin 0.49 (*)     All other components within normal limits    Narrative:     Performed at:  [place] [address] [place]   Phone [phone] - Abnormal; Notable for the following components:    Pro-BNP 65,282 (*)     All other components within normal limits    Narrative:     Performed at:  [place] [address] [place]   Phone [phone]   BLOOD GAS, VENOUS - Abnormal; Notable for the following components:    pCO2, Shane 37.8 (*)     Carboxyhemoglobin 1.6 (*)     All other components within normal limits    Narrative:     Performed at:  [address], [facility] [place]   Phone [phone]   LACTIC ACID, PLASMA - Abnormal; Notable for the following components:    Lactic Acid 2.4 (*)     All other components within normal limits    Narrative:     Performed at:  [address], [facility] [place]   Phone [phone] - Abnormal; Notable for the following components:    Blood, Urine MODERATE (*)     Protein,  (*)     Leukocyte Esterase, Urine MODERATE (*)     All other components within normal limits    Narrative:     Performed at:  [address],  [place], [address]   Phone [phone]   MICROSCOPIC URINALYSIS - Abnormal; Notable for the following components:    WBC, UA >100 (*)     RBC, UA see below (*)     All other components within normal limits    Narrative:     Performed at:  [address],  [place], [address]   Phone [phone]   D-DIMER, QUANTITATIVE - Abnormal; Notable for the following components:    D-Dimer, Quant 900 (*)     All other components within normal limits    Narrative:     Performed at:  [address],  [place], [address]   Phone [phone]   FIBRINOGEN - Abnormal; Notable for the following components:    Fibrinogen 577 (*)     All other components within normal limits    Narrative:     Performed at:  [address],  [place], [address]   Phone [phone]   C-REACTIVE PROTEIN - Abnormal; Notable for the following components:    .0 (*)     All other components within normal limits    Narrative:     Performed at:  [facility]  [address] [facility] [place] [address]   Phone [phone]   FERRITIN - Abnormal; Notable for the following components:    Ferritin 758.0 (*)     All other components within normal limits    Narrative:     Performed at:  [facility]  [address] [facility] [place] [address]   Phone [phone]   LACTATE DEHYDROGENASE - Abnormal; Notable for the following components:     (*)     All other components within normal limits    Narrative:     Performed at:  [address] [place] [address] Laboratory  [address] [place] [address],  [address] [place] [address], [address]   Phone [phone]   PROCALCITONIN - Abnormal; Notable for the following components:    Procalcitonin 0.45 (*)     All other components within normal limits    Narrative:     Performed at:  [address],  [address], [address]   Phone [phone]   BASIC METABOLIC PANEL W/ REFLEX TO MG FOR LOW K - Abnormal; Notable for the following components:    Glucose 172 (*)     BUN 51 (*)     CREATININE 3.3 (*)     GFR Non- 17 (*)     GFR  21 (*)     Calcium 10.8 (*)     All other components within normal limits    Narrative:     Prince Jori BAGLEY tel. [phone],  Chemistry results called to and read back by August Causey RN, 05/06/2021 05:50,  by NIMISHA  Performed at:  [address],  [address] [place] [address], [address] [facility]   Phone [phone]   CBC WITH AUTO DIFFERENTIAL - Abnormal; Notable for the following components:    RBC 2.49 (*)     Hemoglobin 8.1 (*)     Hematocrit 23.9 (*)     RDW 17.7 (*)     Lymphocytes Absolute 0.7 (*)     All other components within normal limits    Narrative:     Performed at:  [address],  [address] [place] [address], [address] [employer] [address]   Phone [phone] - Abnormal; Notable for the following components:    Pro-BNP >70,000 (*)     All other components within normal limits    Narrative:     [address] [phone],  Chemistry results called to and read back by August Causey RN, 05/06/2021 05:50,  by NIMISHA  Performed at:  [address],  [address] [place] [address], [address] [facility]   Phone [phone]   TROPONIN - Abnormal; Notable for the following components:    Troponin 0.58 (*)     All other components within normal limits    Narrative:     Prince Jori BAGLEY tel. [phone],  Chemistry results called to and read back by Connie Montes De Oca RN, 05/06/2021 Phone [phone]   LEGIONELLA ANTIGEN, URINE    Narrative:     ORDER#: [id number]                          ORDERED BY: Sariah Casas  SOURCE: Urine Voided                       COLLECTED:  05/06/21 01:00  ANTIBIOTICS AT JONATHAN.:                      RECEIVED :  05/06/21 07:22  Performed at:  [facility]  [address] [facility] [place] [address]   Phone [phone]   STREP PNEUMONIAE ANTIGEN    Narrative:     ORDER#: [id number]                          ORDERED BY: Sariah Casas  SOURCE: Urine Voided                       COLLECTED:  05/06/21 01:00  ANTIBIOTICS AT JONATHAN.:                      RECEIVED :  05/06/21 07:22  Performed at:  [facility]  [address] [place] [address]   Phone [phone]   CULTURE, BLOOD 1   CULTURE, BLOOD 2   GRAM STAIN   CULTURE, RESPIRATORY   SAMPLE POSSIBLE BLOOD BANK TESTING    Narrative:     Performed at:  [place] [address] [address]   Phone [phone]   URINE DRUG SCREEN    Narrative:     Performed at:  [place] [address] [address]   Phone [phone]   LACTIC ACID, PLASMA    Narrative:     Performed at:  [place] [address] [address]   Phone [phone]    Narrative:     Performed at:  [facility]  [address] [facility] [place] [address]   Phone [phone]   SODIUM, URINE, RANDOM   CREATININE, RANDOM URINE   URIC ACID       All other labs were within normal range or not returned asof this dictation. EKG:  All EKG's are interpreted by the Emergency Department Physician who either signs or Co-signs this chart in the absence of a cardiologist.    The Ekg interpreted by me shows  normal sinus rhythm with a rate of 86  Axis is   Left axis deviation  QTc is  within an acceptable range  Intervals and Durations are unremarkable. ST Segments: no acute change and nonspecific changes  No significant change from prior EKG dated - 2/24/21  No STEMI, RBBB present today similar to old EKG           RADIOLOGY:   Non-plain film images such as CT, Ultrasound and MRI are read by the radiologist. [place] images are visualized and preliminarily interpreted by the  ED Provider with the belowfindings:        Interpretation per the Radiologist below, if available at the time of this note:    CT HEAD WO CONTRAST   Final Result   No acute intracranial abnormality. Diffuse atrophic changes with findings suggesting chronic microvascular   ischemia         XR CHEST PORTABLE   Final Result   New bilateral diffuse airspace opacities could represent pulmonary edema or   infection               PROCEDURES   Unless otherwise noted below, none     Procedures    CRITICAL CARE TIME   Time: 35 minutes   Includes repeat examinations, lab  interpretation, charting, treating for severe sepsis with IV antibiotics concerning for UTI vs. pneumonia   Excludes separate billable procedures. Patient at risk for serious decompensation if not treated for this life-threatening condition.           CONSULTS:  IP CONSULT TO HOSPITALIST  IP CONSULT TO NEPHROLOGY    EMERGENCY DEPARTMENT COURSE and DIFFERENTIAL DIAGNOSIS/MDM:   Vitals:    Vitals:    05/06/21 1038 05/06/21 1044 05/06/21 1108 05/06/21 1530   BP: 106/61   129/62   Pulse: 61 62  66   Resp: 18   18   Temp: 97.5 °F (36.4 °C)   97.5 °F (36.4 °C)   TempSrc: Axillary   Axillary   SpO2: 98% 92%  96%   Weight:       Height:   5' 5\" (1.651 m)        Patient was given the following medications:  Medications   ascorbic acid (VITAMIN C) tablet 500 mg (500 mg Oral Given 5/6/21 5754)   atorvastatin (LIPITOR) tablet 40 mg (40 mg Oral Not Given 5/5/21 2221)   dextromethorphan-guaiFENesin (MUCINEX DM)  MG per extended release tablet 1 tablet (has no administration in time range)   fluticasone (FLONASE) 50 MCG/ACT nasal spray 1 spray (1 spray Each Nostril Given 2/7/35 1352)   folic acid (FOLVITE) tablet 1 mg (1 mg Oral Given 5/6/21 0754)   levalbuterol (XOPENEX) nebulizer solution 1.25 mg (has no administration in time range)   levothyroxine (SYNTHROID) tablet 88 mcg (88 mcg Oral Given 5/6/21 0755)   pantoprazole (PROTONIX) tablet 40 mg (40 mg Oral Given 5/6/21 0755)   sodium chloride flush 0.9 % injection 10 mL (10 mLs Intravenous Given 5/6/21 1045)   sodium chloride flush 0.9 % injection 10 mL (has no administration in time range)   0.9 % sodium chloride infusion (has no administration in time range)   enoxaparin (LOVENOX) injection 40 mg (40 mg Subcutaneous Given 5/6/21 1045)   promethazine (PHENERGAN) tablet 12.5 mg (has no administration in time range)     Or   ondansetron (ZOFRAN) injection 4 mg (has no administration in time range)   polyethylene glycol (GLYCOLAX) packet 17 g (has no administration in time range)   acetaminophen (TYLENOL) tablet 650 mg (has no administration in time range)     Or   acetaminophen (TYLENOL) suppository 650 mg (has no administration in time range)   cefTRIAXone (ROCEPHIN) 1000 mg IVPB in 50 mL D5W minibag (0 mg Intravenous Stopped 5/6/21 1112)     And   doxycycline (VIBRAMYCIN) 100 mg in dextrose 5 % 100 mL IVPB (0 mg Intravenous Stopped 5/6/21 1214)   furosemide (LASIX) injection 40 mg (40 mg Intravenous Not Given 5/6/21 1045)   iron sucrose (VENOFER) 100 mg in sodium chloride 0.9 % 100 mL IVPB (has no administration in time range)   0.9 % sodium chloride bolus (0 mLs Intravenous Stopped 5/5/21 2005)   cefTRIAXone (ROCEPHIN) 1000 mg IVPB in 50 mL D5W minibag (0 mg Intravenous Stopped 5/5/21 2005)   azithromycin (ZITHROMAX) 500 mg in D5W 250ml addavial (0 mg Intravenous Stopped 5/5/21 2158)   acetaminophen (TYLENOL) suppository 650 mg (650 mg Rectal Given 5/5/21 1755)   furosemide (LASIX) injection 40 mg (40 mg Intravenous Given 5/5/21 2102)   aspirin suppository 300 mg (300 mg Rectal Given 5/6/21 8988)     Patient was evaluated due to concern for altered mental status with fever with concern for severe sepsis. Initial lactate was elevated and therefore he did receive IV fluids and repeat lactic acid was down to 1.4. We did try to be judicious with fluids since he did appear to be possibly in heart failure as well with some pulmonary edema on x-ray. He was started on Rocephin which would treat UTI along with azithromycin which would also treat possibility of pneumonia. He will need further evaluation in the hospital and at this point was stable for the floor with telemetry. The patient tolerated their visit well. The patient and / or the family were informed of the results of any tests, a time was given to answer questions. FINAL IMPRESSION      1. Severe sepsis ([address]    2. Multifocal pneumonia    3. Acute on chronic congestive heart failure, unspecified heart failure type [facility])          DISPOSITION/PLAN   DISPOSITION Admitted 05/05/2021 08:26:59 PM      PATIENT REFERRED TO:  No follow-up provider specified.     DISCHARGEMEDICATIONS:  Current Discharge Medication List          DISCONTINUED MEDICATIONS:  Current Discharge Medication List                 (Please note that portions of this note were completed with a voicerecognition program.  Efforts were made to edit the dictations but occasionally words are mis-transcribed.)    Selina Awan MD (electronically signed)            Selina Awan MD  05/06/21 1600

## 2021-05-05 NOTE — ED NOTES
Bloody drainage noted to patients pre-existing catheter. Dr. Angela Steinberg aware. Patient cleaned at this time.       Miguel Condon RN  05/05/21 6761

## 2021-05-05 NOTE — ED NOTES

## 2021-05-05 NOTE — ED NOTES
Bed: 04  Expected date:   Expected time:   Means of arrival:   Comments:  Ariel Deng RN  05/05/21 3464

## 2021-05-05 NOTE — ED NOTES
Blood culture set #1 drawn from left [facility] with angio. Bottle tops scrubbed with alcohol pads. Site prepped with [employer] swab, 15 seconds per side, and allowed to dry for 30 seconds prior to venipuncture. Red waste tube drawn prior to collection of specimen.          Ameya Estrella RN  05/05/21 9015

## 2021-05-05 NOTE — ED NOTES
Blood culture set #2 drawn from right forearm with angio. Bottle tops scrubbed with alcohol pads. Site prepped with [employer] swab, 15 seconds per side, and allowed to dry for 30 seconds prior to venipuncture. Red waste tube drawn prior to collection of specimen.          Celi Mixon RN  05/05/21 5862

## 2021-05-06 NOTE — CONSULTS
Nephrology consult received    Patient seen and examined    Full consult note to follow    Thank you

## 2021-05-06 NOTE — PROGRESS NOTES
Speech Language Pathology  Facility/Department: [facility] C5 - MED SURG/ORTHO   CLINICAL BEDSIDE SWALLOW EVALUATION    NAME: Mando Hawkins  : 1925  MRN: [id number]       Recommended Diet and Intervention  Diet Solids Recommendation: Dysphagia Soft and Bite-Sized (Dysphagia III)  Liquid Consistency Recommendation: Moderately Thick (Honey)  Recommended Form of Meds: Meds in puree  *Risk Management: assist feed, fully upright, cue to throat clear, cue to double swallow    **hold PO and contact SLP if s/s of aspiration develop or worsening respiratory status       ADMISSION DATE: 2021  ADMITTING DIAGNOSIS: has Gastrointestinal hemorrhage; Multiple gastric ulcers; Duodenal bulb ulcer; Coronary artery disease involving native coronary artery of native heart without angina pectoris; Essential hypertension; Pulmonary HTN ([facility] [address] Pacemaker[employer]; PNA (pneumonia); Suspected COVID-19 virus infection; Acute CHF (congestive heart failure) ([facility] [address] and CKD (chronic kidney disease) stage 4, GFR 15-29 ml/min (formerly [facility]) on their problem list.  ONSET DATE: 2021    Recent Chest Xray/CT of Chest:  2021  Impression   New bilateral diffuse airspace opacities could represent pulmonary edema or   infection       Date of Eval: 2021  Evaluating Therapist: Ángela Daugherty    Current Diet level:  Current Diet : NPO  Current Liquid Diet : NPO      Primary Complaint  Patient Complaint: Per MD H&P: \"80 y.o. male who presented to [facility] with above complaints  Patient presented to the ED via squad for altered mental status. Patient unable to provide any history. EMS was apparently called as the patient was exhibiting altered mental status and unresponsiveness. Spoke to patient's daughter Reinier Bonner. She reports that patient pulled out the bag from his indwelling Mancuso catheter yesterday, but the catheter apparently remained intact.   Later home health care nurse came to change his Mancuso catheter, and when she did he had profuse amounts of gross hematuria. Patient was eventually taken to the urology clinic where they took out his Jose and inserted a coudé catheter. Daughter reports that patient has declined since this happened and has become less responsive with some wheezing. She also reports he has a UTI and has been on ciprofloxacin at home. He apparently uses nebulizer twice a day. Not on oxygen at home. Daughter is concerned that the patient lost a lot of blood, he had blood drawn and was told that his Hb was 15, and daughter reports that PCP informed her that patient needed iron infusion, and daughter had scheduled the patient to get iron infusion tomorrow at [facility]. \"    Pain:  Pain Assessment  Pain Assessment: 0-10  Pain Level: 0     Vitals/labs:   Temp: n/a  SpO2: 95% on 2L O2 via NC  RR: 20  BP: 114/59  HR: 65      Reason for Referral  Mazin Lyons was referred for a bedside swallow evaluation to assess the efficiency of his swallow function, identify signs and symptoms of aspiration and make recommendations regarding safe dietary consistencies, effective compensatory strategies, and safe eating environment. Impression  Dysphagia Diagnosis: Moderate to severe pharyngeal stage dysphagia;Mild to moderate oral stage dysphagia  Dysphagia Outcome Severity Scale: Level 3: Moderate dysphagia- Total assisstance, supervision or strategies. Two or more diet consistencies restricted     Dysphagia Impression : Pt is a 80year old male admitted to [facility] on 05/05/2021 due to AMS. Pt with jose catheter issues at home, resulting in inserting a coudé catheter recently. Since then, pt has been less responsive and increased wheezing per MD. Pt's daughter did report that pt has a UTI. PMHx includes CHF, CAD, PNA, HTN. Pt recently in hospital in February 2021 due to GI hemorrhage. Per chart review, pt uses a nebulizer 2x a day at home, but was not on home O2. Pt seen by  as an outpatient for a MBSS on 04/27/2021.  MBSS had recommended dysphagia III (soft and bite sized) and honey thick liquids. The following are the impressions from this MBSS:  · \"Pt's oral phase deficits characterized by weak lingual manipulation and premature bolus loss to the pharynx with all PO trials. · Pt's pharyngeal phase deficits characterized by mildly delayed swallow initiation, mildly reduced anterior laryngeal movement, and reduced airway/laryngeal closure with episodes of deep penetration before and during the swallow with thin and nectar-thick liquids that did not completely clear despite cued cough from SLP (with and without use of compensatory strategies). x1 episode of aspiration during the swallow with thin liquid and use of chin tuck strategy. Pt with immediate strong cough after episode of aspiration however this did not completely clear aspirated material.  Honey-thick liquids via cup resulted in shallow penetration during the swallow that partially cleared (x1; completely cleared x1) and x1 episode after the swallow d/t trace pharyngeal residue. Pt benefited from intermittent cued throat clear throughout study. No penetration / aspiration noted with solid PO trials. Min-mod residue at valleculae post-swallow with puree, increased to mod amount post-swallow with regular solids. Pt benefited from cued use of double swallow which successfully cleared majority of this residue. · *Pt would benefit from dysphagia tx with home ST or outpatient ST if consistent with pt's POC / wishes. Pt and pt's daughter present for study were in agreement with recommendation. \"    Pt is currently in droplet plus isolation for possible COVID; PNA may be due to [place]ia. RN ok'd entry of SLP, reported that pt had been coughing with meds. Pt fatigued, but easily aroused. Pt agreeable to BSE. Pt upright in bed. Oral mech exam indicated generalized oral weakness. Adequate dentition. SpO2% of 95 on 2L O2 via NC and RR of 20 prior to PO trials.  Ice chips, thin liquid via tsp, thin liquid via cup sip, NTL via tsp and cup sip, HTL via tsp and cup sip, puree, mech soft solid, and regular solid trials. Slow and prolonged mastication with regular solids. Pt with decreased A-P transit and oral residue following regular solids. Immediate post-swallow coughing with regular solids. Adequate mastication, A-P propulsion, and oral clearance with mech soft solids and puree. Premature spillage across consistencies. Upon palpation of anterior neck, suspect significantly delayed trigger of pharyngeal swallow and  decreased laryngeal elevation. Immediate wet vocal quality and throat clearing with NTL. No overt s/s of aspiration with thin liquids, but concerns with silent aspiration d/t pt's hx and aspiration on MBSS completed 04/27/2021. Pt with no s/s of aspiration with HTL. SLP recommends soft and bite sized solids, moderately (honey) thick liquids. Meds whole in puree. Cue pt to swallow 2x per bite/sip and cue pt to throat clear throughout meal. Assist with meals. RN made aware of recommendations. Hold PO and contact SLP if s/s of aspiration develop. SLP to continue to follow. Treatment Plan  Requires SLP Intervention: Yes  Duration/Frequency of Treatment: 3-5x/week  D/C Recommendations: To be determined       Recommended Diet and Intervention  Diet Solids Recommendation: Dysphagia Soft and Bite-Sized (Dysphagia III)  Liquid Consistency Recommendation: Moderately Thick (Honey)  Recommended Form of Meds: Meds in puree  *Risk Management: assist feed, fully upright, cue to throat clear, cue to double swallow  Recommendations: Dysphagia treatment;Assist feed  Therapeutic Interventions: Diet tolerance monitoring;Patient/Family education; Therapeutic PO trials with SLP;Oral care;Pharyngeal exercises; Laryngeal exercises    Compensatory Swallowing Strategies  Compensatory Swallowing Strategies: Alternate solids and liquids;Eat/Feed slowly;Upright as possible for all oral intake; No straws;Remain

## 2021-05-06 NOTE — PROGRESS NOTES
Perfect serve sent to MD;  [facility] came back negative and iron results are  back. Thanks. \"

## 2021-05-06 NOTE — PLAN OF CARE
Nutrition Problem #1: Inadequate oral intake  Intervention: Food and/or Nutrient Delivery: Continue Current Diet, Start Oral Nutrition Supplement  Nutritional Goals: Consume 50% or greater of 3 meals per day and ONS.

## 2021-05-06 NOTE — PROGRESS NOTES
RESPIRATORY THERAPY ASSESSMENT    Name:  Yudi Steen  Medical Record Number:  [id number]  Age: 80 y.o. Gender: male  : 1925  Today's Date:  2021  Room:  31/0531-01    Assessment     Is the patient being admitted for a COPD or Asthma exacerbation? No   (If yes the patient will be seen every 4 hours for the first 24 hours and then reassessed)    Patient Admission Diagnosis      Allergies  Allergies   Allergen Reactions    Albuterol        Minimum Predicted Vital Capacity:     na          Actual Vital Capacity:      na              Pulmonary History:CHF/Pulmonary Edema  Home Oxygen Therapy:  room air  Home Respiratory Therapy:Levalbuterol prn  Current Respiratory Therapy:  xOPENEX prn          Respiratory Severity Index(RSI)   Patients with orders for inhalation medications, oxygen, or any therapeutic treatment modality will be placed on Respiratory Protocol. They will be assessed with the first treatment and at least every 72 hours thereafter. The following severity scale will be used to determine frequency of treatment intervention.     Smoking History: Pulmonary Disease or Smoking History, Greater than 15 pack year = 2    Social History  Social History     Tobacco Use    Smoking status: Former Smoker     Quit date: 1975     Years since quittin.3    Smokeless tobacco: Never Used    Tobacco comment: smoked for 30 years   Substance Use Topics    Alcohol use: Not Currently    Drug use: Not on file       Recent Surgical History: None = 0  Past Surgical History  Past Surgical History:   Procedure Laterality Date    UPPER GASTROINTESTINAL ENDOSCOPY N/A 2021    EGD DIAGNOSTIC ONLY performed [place] at [address]       Level of Consciousness: Alert, Oriented, and Cooperative = 0    Level of Activity: Walking with assistance = 1    Respiratory Pattern: Increased; RR 21-30 = 1    Breath Sounds: Diminshed bilaterally and/or crackles = 2    Sputum   ,  ,    Cough: Strong, spontaneous, non-productive = 0    Vital Signs   /68   Pulse 63   Temp 97.7 °F (36.5 °C) (Axillary)   Resp 18   Wt 135 lb (61.2 kg)   SpO2 99%   BMI 22.47 kg/m²   SPO2 (COPD values may differ): Greater than or equal to 92% on room air = 0    Peak Flow (asthma only): not applicable = 0    RSI: 5-6 = Q4hr PRN (every four hours as needed) for dyspnea        Plan       Goals: medication delivery, mobilize retained secretions, volume expansion and improve oxygenation    Patient/caregiver was educated on the proper method of use for Respiratory Care Devices:  Yes      Level of patient/caregiver understanding able to:   ? Verbalize understanding   ? Demonstrate understanding       ? Teach back        ? Needs reinforcement       ? No available caregiver               ? Other:     Response to education:  Good     Is patient being placed on Home Treatment Regimen? Yes     Does the patient have everything they need prior to discharge? Yes     Comments: PT ASSESSED AND CHART REVIEWED    Plan of Care: home reg    Electronically signed by Patricia August RCP on 5/5/2021 at 11:27 PM    Respiratory Protocol Guidelines     1. Assessment and treatment by Respiratory Therapy will be initiated for medication and therapeutic interventions upon initiation of aerosolized medication. 2. Physician will be contacted for respiratory rate (RR) greater than 35 breaths per minute. Therapy will be held for heart rate (HR) greater than 140 beats per minute, pending direction from physician. 3. Bronchodilators will be administered via Metered Dose Inhaler (MDI) with spacer when the following criteria are met:  a. Alert and cooperative     b. HR < 140 bpm  c. RR < 30 bpm                d. Can demonstrate a 23 second inspiratory hold  4. Bronchodilators will be administered via Hand Held Nebulizer [facility]) to patients when ANY of the following criteria are met  a. Incognizant or uncooperative          b.  Patients treated with

## 2021-05-06 NOTE — PROGRESS NOTES
Comprehensive Nutrition Assessment    Type and Reason for Visit:  Initial, Positive Nutrition Screen(Weight loss, decreased appetite and swallowing difficulties.)    Nutrition Recommendations/Plan:   1. Continue current soft and bite-sized diet and encourage PO intake  2. RD to add Magic cup TID for extra calories and protein  3. RD to order new weight. 4. Monitor nutrition adequacy, pertinent labs, bowel habits, wt changes, and clinical progress    Nutrition Assessment:  Positive screen for wt loss, decreased appetite and chewing/swallowing difficulties: Pt admitted with AMS. SLP rudy today - currently on dysphagia soft and bite sized diet. Spoke to pt on phone today, reported a good appetite and usual consumption of 3 meals per day with snacks. No reported N/V/D, pt reported some weight loss but unsure of how much. Ordered pt lunch while on phone with him and suggested ONS- pt willing to trial Magic cup TID. Will continue to monitor. Malnutrition Assessment:  Malnutrition Status: At risk for malnutrition (Comment)(Pt reported wt loss (unsure of how much). Unable to visit pt due to COVID-19.)    Context:  Acute Illness       Estimated Daily Nutrient Needs:  Energy (kcal):  2026-2387 kcals/day; Weight Used for Energy Requirements:  Ideal(62 kg)     Protein (g):  62-74 g/day; Weight Used for Protein Requirements:  Ideal(1-1.2 g/kg)        Fluid (ml/day):  1800 ml or per MD; Method Used for Fluid Requirements:  1 ml/kcal      Nutrition Related Findings:  Pt not sure when last BM was. Wounds:  None(Abrasions.)       Current Nutrition Therapies:    DIET DYSPHAGIA SOFT AND BITE-SIZED;     Anthropometric Measures:  · Height: 5' 5\" (165.1 cm)  · Current Body Weight: 135 lb (61.2 kg)    · Ideal Body Weight: 136 lbs; % Ideal Body Weight 99.3 %   · BMI: 22.5  · BMI Categories: Underweight (BMI less than 22) age over 72       Nutrition Diagnosis:   · Inadequate oral intake related to early satiety, swallowing difficulty as evidenced by weight loss, intake 26-50%, intake 51-75%, poor intake prior to admission, swallow study results    Nutrition Interventions:   Food and/or Nutrient Delivery:  Continue Current Diet, Start Oral Nutrition Supplement  Nutrition Education/Counseling:  No recommendation at this time   Coordination of Nutrition Care:  Continue to monitor while inpatient    Goals:  Consume 50% or greater of 3 meals per day and ONS.        Nutrition Monitoring and Evaluation:   Behavioral-Environmental Outcomes:  None Identified   Food/Nutrient Intake Outcomes:  Food and Nutrient Intake, Supplement Intake, Diet Advancement/Tolerance  Physical Signs/Symptoms Outcomes:  Chewing or Swallowing, Constipation, Nutrition Focused Physical Findings     Discharge Planning:    Continue current diet     Electronically signed by Isi Adams MS, RD, LD on 5/6/21 at 11:15 AM EDT    Contact: Office: [phone]; [address] [place] [address] Road: [phone]

## 2021-05-06 NOTE — PROGRESS NOTES
Spoke to patients daughter Canelo Gonzales [employer] [facility]) to answer admission questions and update her on patient condition. She stated that she was concerned about patients iron level, stated he had labs done and \"his iron level was 12 and is normally 50\". She also said that she was concerned for patients blood loss. Staff provided education regarding H/H and explained that the levels were low but stable and we would continue to monitor. Also explained that the doctors were aware of her concerns but that RN would notify them again. Canelo Gonzales said she was on her way to  her father and stated \"I don't know why I even brought him to the hospital if he's not going to get an iron infusion\". She then explained that she would like to speak to someone else, possibly a supervisor regarding her father's care. Clinical made aware of situation and spoke to Canelo Gonzales. RN called Canelo Gonzales again to answer any further questions and provide update on patient's care. Situation resolved.

## 2021-05-06 NOTE — CONSULTS
Nephrology Consult Note                                                                                                                                                                                                                                                                                                                                                               Office : [phone]     Fax :[phone]              Patient's Name: Holley Eaton  2:50 PM  5/6/2021    Reason for Consult:  CKD stage 4 , hypervolemia evaluation     Requesting Physician:  Zenia Scheuermann, MD      Chief Complaint:  AMS      History of Present Ilness:    Holley Eaton is a 80 y.o. male with  PMh of CKD stage 4 , hyperkalemia     S/p PPM , CAD s/p CABG was brought in with c/o Altered mental status. He is being treated for PNA and AMS    Nephrology consult for CKD and volume management. Past Medical History:   Diagnosis Date    Acute CHF (congestive heart failure) ([facility] [address] 5/5/2021    Arrhythmia     CAD (coronary artery disease)     CKD (chronic kidney disease) stage 4, GFR 15-29 ml/min ([facility] [address] 5/5/2021    Pneumonia 02/2021       Past Surgical History:   Procedure Laterality Date    UPPER GASTROINTESTINAL ENDOSCOPY N/A 2/25/2021    EGD DIAGNOSTIC ONLY performed by Fabian Molina MD at [address] [place] [address]       No family history on file. reports that he quit smoking about 46 years ago. He has never used smokeless tobacco. He reports previous alcohol use.     Allergies:  Albuterol    Current Medications:    ascorbic acid (VITAMIN C) tablet 500 mg, Daily  atorvastatin (LIPITOR) tablet 40 mg, Nightly  dextromethorphan-guaiFENesin (MUCINEX DM)  MG per extended release tablet 1 tablet, Q12H PRN  fluticasone (FLONASE) 50 MCG/ACT nasal spray 1 spray, Daily  folic acid (FOLVITE) tablet 1 mg, Daily  levalbuterol (XOPENEX) nebulizer solution 1.25 mg, Q4H PRN  levothyroxine (SYNTHROID) tablet 88 mcg, Daily pantoprazole (PROTONIX) tablet 40 mg, QAM AC  sodium chloride flush 0.9 % injection 10 mL, 2 times per day  sodium chloride flush 0.9 % injection 10 mL, PRN  0.9 % sodium chloride infusion, PRN  enoxaparin (LOVENOX) injection 40 mg, Daily  promethazine (PHENERGAN) tablet 12.5 mg, Q6H PRN    Or  ondansetron (ZOFRAN) injection 4 mg, Q6H PRN  polyethylene glycol (GLYCOLAX) packet 17 g, Daily PRN  acetaminophen (TYLENOL) tablet 650 mg, Q6H PRN    Or  acetaminophen (TYLENOL) suppository 650 mg, Q6H PRN  cefTRIAXone (ROCEPHIN) 1000 mg IVPB in 50 mL D5W minibag, Q24H    And  doxycycline (VIBRAMYCIN) 100 mg in dextrose 5 % 100 mL IVPB, Q12H  furosemide (LASIX) injection 40 mg, BID        Review of Systems:   14 point ROS obtained but were negative except mentioned in HPI      Physical exam:     Vitals:  /61   Pulse 62   Temp 97.5 °F (36.4 °C) (Axillary)   Resp 18   Ht 5' 5\" (1.651 m)   Wt 135 lb (61.2 kg)   SpO2 92%   BMI 22.47 kg/m²   Constitutional:  OAA X3 NAD  Skin: no rash, turgor wnl  Heent:  eomi, mmm  Neck: no bruits or jvd noted  Cardiovascular:  S1, S2 without m/r/g  Respiratory: reduced air entry BL    Abdomen:  +bs, soft, nt, nd  Ext:  No lower extremity edema  Psychiatric: mood and affect appropriate  Musculoskeletal:  Rom, muscular strength intact    Data:   Labs:  CBC:   Recent Labs     05/05/21 1710 05/06/21  0507 05/06/21  0525   WBC 8.7  --  7.6   HGB 10.4* 8.1* 8.1*     --  219     BMP:    Recent Labs     05/05/21 1710 05/06/21  0507    139   K 4.8 3.9   CL 98* 101   CO2 23 26   BUN 45* 51*   CREATININE 3.2* 3.3*   GLUCOSE 213* 172*     Ca/Mg/Phos:   Recent Labs     05/05/21 1710 05/06/21  0507   CALCIUM 11.7* 10.8*     Hepatic:   Recent Labs     05/05/21  1710   AST 38*   ALT 13   BILITOT 0.5   ALKPHOS 70     Troponin:   Recent Labs     05/05/21  1710 05/05/21  2324 05/06/21  0507   TROPONINI 0.49* 0.58* 0.61*     BNP: No results for input(s): BNP in the last 72 hours. [facility] free to [facility]   [facility] [address]  Office : [phone]  Fax :[phone]

## 2021-05-06 NOTE — PROGRESS NOTES
4 Eyes Skin Assessment     The patient is being assess for   Admission    I agree that 2 RN's have performed a thorough Head to Toe Skin Assessment on the patient. ALL assessment sites listed below have been assessed. Areas assessed by both nurses:   [x]   Head, Face, and Ears   [x]   Shoulders, Back, and Chest, Abdomen  [x]   Arms, Elbows, and Hands   [x]   Coccyx, Sacrum, and Ischium  [x]   Legs, Feet, and Heels        Redness on buttocks, abrasion to R upper thigh/buttock, redness/discoloration to L elbow     **SHARE this note so that the co-signing nurse is able to place an eSignature**    Co-signer eSignature: {Esignature:[id number]}    Does the Patient have Skin Breakdown?   No          Lei Prevention initiated:  Yes   Wound Care Orders initiated:  No      [facility] nurse consulted for Pressure Injury (Stage 3,4, Unstageable, DTI, NWPT, Complex wounds)and New or Established Ostomies:  No      Primary Nurse eSignature: Electronically signed by Oliverio Ogden on 5/6/21 at 9:05 AM EDT

## 2021-05-06 NOTE — H&P
Erick Fish MD at [address] [place] [address]       Medications Prior to Admission:      Prior to Admission medications    Medication Sig Start Date End Date Taking?  Authorizing Provider   levalbuterol (XOPENEX) 1.25 MG/3ML nebulizer solution Take 3 mLs by nebulization every 4 hours as needed for Wheezing or Shortness of Breath Failed albuterol 4/20/21   Dayana Leroy MD   sodium zirconium cyclosilicate (LOKELMA) 10 g PACK oral suspension Take 10 g by mouth 4 time a week    Historical Provider, MD   atorvastatin (LIPITOR) 40 MG tablet Take 1 tablet by mouth nightly 3/1/21   ALDO Lopez CNP   metoprolol succinate (TOPROL XL) 25 MG extended release tablet Take 0.5 tablets by mouth daily 3/1/21   ALDO Lopez CNP   pantoprazole (PROTONIX) 40 MG tablet Take 1 tablet by mouth every morning (before breakfast) 3/2/21   ALDO Lopez CNP   ipratropium-albuterol (DUONEB) 0.5-2.5 (3) MG/3ML SOLN nebulizer solution Inhale 1 vial into the lungs every 4 hours    Historical Provider, MD   levothyroxine (SYNTHROID) 88 MCG tablet Take 88 mcg by mouth Daily    Historical Provider, MD   patiromer sorbitex calcium (VELTASSA) 8.4 g PACK packet Take 8.4 g by mouth daily 3 times a week    Historical Provider, MD   coenzyme Q-10 100 MG capsule Take 100 mg by mouth daily    Historical Provider, MD   Flaxseed, Linseed, (FLAXSEED OIL) 1000 MG CAPS Take by mouth    Historical Provider, MD   fluticasone (FLONASE) 50 MCG/ACT nasal spray 1 spray by Each Nostril route daily    Historical Provider, MD   folic acid (FOLVITE) 1 MG tablet Take 1 mg by mouth daily    Historical Provider, MD   dextromethorphan-guaiFENesin (MUCINEX DM)  MG per extended release tablet Take 1 tablet by mouth every 12 hours as needed 1/2 tab daily    Historical Provider, MD   Ascorbic Acid (VITAMIN C) 250 MG tablet Take 500 mg by mouth daily    Historical Provider, MD       Allergies:  Albuterol    Social History:      The patient currently lives at home    TOBACCO:   reports that he quit smoking about 46 years ago. He has never used smokeless tobacco.  ETOH:   reports previous alcohol use. E-Cigarettes/Vaping Use     Questions Responses    E-Cigarette/Vaping Use Never User    Start Date     Passive Exposure     Quit Date     Counseling Given     Comments             Family History:  Reviewed in detail and negative for DM, CAD, Cancer, CVA. REVIEW OF SYSTEMS COMPLETED:   Unable to obtain due to altered mental status    PHYSICAL EXAM PERFORMED:    /67   Pulse 66   Temp 97.9 °F (36.6 °C) (Axillary)   Resp 22   Wt 135 lb (61.2 kg)   SpO2 99%   BMI 22.47 kg/m²     General appearance:  No apparent distress, appears stated age and cooperative. HEENT:  Normal cephalic, atraumatic without obvious deformity. Pupils equal, round, and reactive to light. Extra ocular muscles intact. Conjunctivae/corneas clear. Neck: Supple, with full range of motion. No jugular venous distention. Trachea midline. Respiratory:  Normal respiratory effort. Clear to auscultation bilaterally with occasional rales   cardiovascular:  Regular rate and rhythm with normal S1/S2 without murmurs, rubs or gallops. Abdomen: Soft, non-tender, non-distended with normal bowel sounds. Musculoskeletal:  No clubbing, cyanosis or edema bilaterally. Full range of motion without deformity. Skin: Skin color, texture, turgor normal.  No rashes or lesions.   Neurologic: Delirious with eyes closed, opens eyes to noxious stimuli , unable to follow simple commands , moves all 4 extremities with stimuli   Psychiatric: Unable to assess  Capillary Refill: Brisk,3 seconds, normal  Peripheral Pulses: +2 palpable, equal bilaterally       Labs:     Recent Labs     05/05/21 1710   WBC 8.7   HGB 10.4*   HCT 31.8*        Recent Labs     05/05/21 1710      K 4.8   CL 98*   CO2 23   BUN 45*   CREATININE 3.2*   CALCIUM 11.7*     Recent Labs     05/05/21 1710   AST 38* ALT 13   BILITOT 0.5   ALKPHOS 70     No results for input(s): INR in the last 72 hours. Recent Labs     05/05/21  1710   TROPONINI 0.49*       Urinalysis:      Lab Results   Component Value Date    NITRU Negative 05/05/2021    WBCUA >100 05/05/2021    BACTERIA Rare 02/25/2021    RBCUA see below 05/05/2021    BLOODU MODERATE 05/05/2021    SPECGRAV 1.025 05/05/2021    GLUCOSEU Negative 05/05/2021       Radiology:     CXR: I have reviewed the CXR with the following interpretation: Bilateral airspace opacities  EKG:  I have reviewed the EKG with the following interpretation: Wide QRS rhythm, RBBB    CT HEAD WO CONTRAST   Final Result   No acute intracranial abnormality. Diffuse atrophic changes with findings suggesting chronic microvascular   ischemia         XR CHEST PORTABLE   Final Result   New bilateral diffuse airspace opacities could represent pulmonary edema or   infection           Echo February 2021   Summary   Technically difficult examination. Normal left ventricular systolic function with ejection fraction of 55-60%. No regional wall motion abnormalites are seen. Mild concentric left ventricular hypertrophy. Grade I diastolic dysfunction with normal filling pressure. Moderate mitral and tricuspid regurgitation. The left atrium is mildly dilated. Moderate aortic stenosis   Mild aortic regurgitation. Systolic pulmonic artery pressure (SPAP) is estimated at 75 mmHg consistent   with severe pulmonary hypertension (Right atrial pressure of 15 mmHg). ASSESSMENT:PLAN:    1) Acute encephalopathy  Likely metabolic encephalopathy from pneumonia, UTI  CT head negative for any acute abnormality  Monitor mental status   hold sedative/hypnotics/narcotics    2) PNA (pneumonia)  Suspected COVID-19 virus infection  CXR showing diffuse bilateral opacities, D-dimer 900, fibrinogen 577 elevated.   Although SARS-CoV-2 rapid was negative, patient could have COVID-19  RT-PCR pending  Continue O2

## 2021-05-06 NOTE — PLAN OF CARE
Problem: Nutrition  Intervention: Swallowing evaluation  Note: SLP completed evaluation. Please refer to notes in EMR. Intervention: Aspiration precautions  Note: SLP completed evaluation. Please refer to notes in EMR.        Pascual Vasquez MA [address] [facility]  Speech Language Pathologist

## 2021-05-07 NOTE — PROGRESS NOTES
Hospitalist Progress Note      PCP: Alonzo Carrera MD    Date of Admission: 5/5/2021    Chief Complaint: Altered mental status    Hospital Course:  80 y.o. male who presented to [facility] with above complaints  Patient presented to the ED via squad for altered mental status. Patient unable to provide any history. EMS was apparently called as the patient was exhibiting altered mental status and unresponsiveness. Spoke to patient's daughter Becka Georges. She reports that patient pulled out the bag from his indwelling Mancuso catheter yesterday, but the catheter apparently remained intact. Later home health care nurse came to change his Mancuso catheter, and when she did he had profuse amounts of gross hematuria. Patient was eventually taken to the urology clinic where they took out his Mancuso and inserted a coudé catheter. Daughter reports that patient has declined since this happened and has become less responsive with some wheezing. She also reports he has a UTI and has been on ciprofloxacin at home. He apparently uses nebulizer twice a day. Not on oxygen at home. Daughter is concerned that the patient lost a lot of blood, he had blood drawn and was told that his Hb was 15, and daughter reports that PCP informed her that patient needed iron infusion, and daughter had scheduled the patient to get iron infusion tomorrow at [facility].       Subjective: No complaints. COVID PCR pending. On 2L.         Medications:  Reviewed    Infusion Medications    sodium chloride       Scheduled Medications    iron sucrose (VENOFER) iv piggyback 100 mL (Admin over 60 minutes)  100 mg Intravenous Q24H    vitamin C  500 mg Oral Daily    atorvastatin  40 mg Oral Nightly    fluticasone  1 spray Each Nostril Daily    folic acid  1 mg Oral Daily    levothyroxine  88 mcg Oral Daily    pantoprazole  40 mg Oral QAM AC    sodium chloride flush  10 mL Intravenous 2 times per day    enoxaparin  40 mg Subcutaneous Daily CO2 23 26   BUN 45* 51*   CREATININE 3.2* 3.3*   CALCIUM 11.7* 10.8*     Recent Labs     05/05/21  1710   AST 38*   ALT 13   BILITOT 0.5   ALKPHOS 70     No results for input(s): INR in the last 72 hours. Recent Labs     05/05/21  1710 05/05/21  2324 05/06/21  0507   TROPONINI 0.49* 0.58* 0.61*       Urinalysis:      Lab Results   Component Value Date    NITRU Negative 05/05/2021    WBCUA >100 05/05/2021    BACTERIA Rare 02/25/2021    RBCUA see below 05/05/2021    BLOODU MODERATE 05/05/2021    SPECGRAV 1.025 05/05/2021    GLUCOSEU Negative 05/05/2021       Radiology:  CT HEAD WO CONTRAST   Final Result   No acute intracranial abnormality. Diffuse atrophic changes with findings suggesting chronic microvascular   ischemia         XR CHEST PORTABLE   Final Result   New bilateral diffuse airspace opacities could represent pulmonary edema or   infection                 Assessment/Plan:    Active Hospital Problems    Diagnosis    PNA (pneumonia) [J18.9]    Suspected COVID-19 virus infection [Z20.822]    Acute CHF (congestive heart failure) ([facility]) [I50.9]    CKD (chronic kidney disease) stage 4, GFR 15-29 ml/min ([facility] [address] [N18.4]    Pacemaker-[employer] [Z95.0]    Essential hypertension [I10]    Multiple gastric ulcers [K25.9]    Coronary artery disease involving native coronary artery of native heart without angina pectoris [I25.10]     Acute encephalopathy  Likely metabolic encephalopathy from pneumonia, UTI  CT head negative for any acute abnormality  Monitor mental status  Hold sedative/hypnotics/narcotics     PNA (pneumonia)  Suspected COVID-19 virus infection  CXR showing diffuse bilateral opacities, D-dimer 900, fibrinogen 577 elevated.   Although SARS-CoV-2 rapid was negative, patient could have COVID-19  RT-PCR pending  Continue O2 supplementation  Empiric IV antibiotics  Sputum culture, Gram stain, urine antigens ordered     Acute CHF (congestive heart failure)  BNP markedly elevated 02156 with elevated troponin likely type II NSTEMI due to demand. Echo from 2/21 showed normal LVEF, grade 1 DD, severe pulmonary hypertension, mod AS  IV Lasix twice daily scheduled  Strict I's/O and daily weights  Follow troponin trend     CKD (chronic kidney disease) stage 4, GFR 15-29 ml/min  Consult nephrology for possible diuresis. BNP > 70,000. Gross hematuria/traumatic Mancuso  Monitor for ongoing bleeding  Hold anticoagulants/antiplatelets  Monitor Hb     UTI - on iv rovcephin     bradycardia S/p pacemaker     Essential hypertension - BP low normal, holding metoprolol, monitor BP     CAD s/p CABG -stable. Follow troponin trend     Hx of GIB - resume PPI     Chronic anemia - Hb 10, monitor. Daughter states patient was supposed to receive iron infusions. Start Venofer 5/6.       DVT Prophylaxis: Lovenox  Diet: Dietary Nutrition Supplements: Frozen Oral Supplement  DIET DYSPHAGIA SOFT AND BITE-SIZED;  Moderately Thick (Honey)  Code Status: Full Code    PT/OT Eval Status: ordered    Dispo - likely 1-2 days inpatient    ALDO Figueredo - CNP

## 2021-05-07 NOTE — CONSULTS
Nephrology Progress  Note                                                                                                                                                                                                                                                                                                                                                               Office : [phone]     Fax :[phone]              Patient's Name: Ria Jimenez  9:27 AM  5/7/2021    Reason for Consult:  CKD stage 4 , hypervolemia evaluation     Requesting Physician:  Kj Gomes MD      Chief Complaint:  AMS      History of Present Ilness:    Ria Jimenez is a 80 y.o. male with  PMh of CKD stage 4 , hyperkalemia     S/p PPM , CAD s/p CABG was brought in with c/o Altered mental status. He is being treated for PNA and AMS    Nephrology consult for CKD and volume management. Interval history :    No sob  No chest pain  No abdominal pain  On lasix  Serum cr increased to 3.6      Past Medical History:   Diagnosis Date    Acute CHF (congestive heart failure) ([facility] [address] 5/5/2021    Arrhythmia     CAD (coronary artery disease)     CKD (chronic kidney disease) stage 4, GFR 15-29 ml/min ([facility] [address] 5/5/2021    Pneumonia 02/2021       Past Surgical History:   Procedure Laterality Date    UPPER GASTROINTESTINAL ENDOSCOPY N/A 2/25/2021    EGD DIAGNOSTIC ONLY performed by Leonor Hudson MD at [address] [place] [address]       No family history on file. reports that he quit smoking about 46 years ago. He has never used smokeless tobacco. He reports previous alcohol use.     Allergies:  Albuterol    Current Medications:    [START ON 5/8/2021] furosemide (LASIX) injection 40 mg, Daily  iron sucrose (VENOFER) 100 mg in sodium chloride 0.9 % 100 mL IVPB, Q24H  ascorbic acid (VITAMIN C) tablet 500 mg, Daily  atorvastatin (LIPITOR) tablet 40 mg, Nightly  dextromethorphan-guaiFENesin (MUCINEX DM)  MG per extended release tablet 1 tablet, Q12H PRN  fluticasone (FLONASE) 50 MCG/ACT nasal spray 1 spray, Daily  folic acid (FOLVITE) tablet 1 mg, Daily  levalbuterol (XOPENEX) nebulizer solution 1.25 mg, Q4H PRN  levothyroxine (SYNTHROID) tablet 88 mcg, Daily  pantoprazole (PROTONIX) tablet 40 mg, QAM AC  sodium chloride flush 0.9 % injection 10 mL, 2 times per day  sodium chloride flush 0.9 % injection 10 mL, PRN  0.9 % sodium chloride infusion, PRN  enoxaparin (LOVENOX) injection 40 mg, Daily  promethazine (PHENERGAN) tablet 12.5 mg, Q6H PRN    Or  ondansetron (ZOFRAN) injection 4 mg, Q6H PRN  polyethylene glycol (GLYCOLAX) packet 17 g, Daily PRN  acetaminophen (TYLENOL) tablet 650 mg, Q6H PRN    Or  acetaminophen (TYLENOL) suppository 650 mg, Q6H PRN  cefTRIAXone (ROCEPHIN) 1000 mg IVPB in 50 mL D5W minibag, Q24H    And  doxycycline (VIBRAMYCIN) 100 mg in dextrose 5 % 100 mL IVPB, Q12H        Review of Systems:   14 point ROS obtained but were negative except mentioned in HPI      Physical exam:     Vitals:  /71   Pulse 63   Temp 97.7 °F (36.5 °C) (Oral)   Resp 18   Ht 5' 5\" (1.651 m)   Wt 131 lb 1.6 oz (59.5 kg)   SpO2 95%   BMI 21.82 kg/m²   Constitutional:  OAA X3 NAD  Skin: no rash, turgor wnl  Heent:  eomi, mmm  Neck: no bruits or jvd noted  Cardiovascular:  S1, S2 without m/r/g  Respiratory: reduced air entry BL    Abdomen:  +bs, soft, nt, nd  Ext:  No lower extremity edema  Psychiatric: mood and affect appropriate  Musculoskeletal:  Rom, muscular strength intact    Data:   Labs:  CBC:   Recent Labs     05/05/21 1710 05/06/21  0507 05/06/21  0525   WBC 8.7  --  7.6   HGB 10.4* 8.1* 8.1*     --  219     BMP:    Recent Labs     05/05/21 1710 05/06/21  0507 05/07/21  0549    139 140   K 4.8 3.9 3.5   CL 98* 101 105   CO2 23 26 24   BUN 45* 51* 63*   CREATININE 3.2* 3.3* 3.6*   GLUCOSE 213* 172* 135*     Ca/Mg/Phos:   Recent Labs     05/05/21  1710 05/06/21  0507 05/07/21  0549   CALCIUM 11.7* 10.8* 10.6 Hepatic:   Recent Labs     05/05/21  1710   AST 38*   ALT 13   BILITOT 0.5   ALKPHOS 70     Troponin:   Recent Labs     05/05/21  1710 05/05/21  2324 05/06/21  0507   TROPONINI 0.49* 0.58* 0.61*     BNP: No results for input(s): BNP in the last 72 hours. Lipids: No results for input(s): CHOL, TRIG, HDL, LDLCALC, LABVLDL in the last 72 hours. ABGs: No results for input(s): PHART, PO2ART, SYH8GSF in the last 72 hours. INR: No results for input(s): INR in the last 72 hours. UA:  Recent Labs     05/05/21  1800   COLORU Yellow   CLARITYU Clear   GLUCOSEU Negative   BILIRUBINUR Negative   KETUA Negative   SPECGRAV 1.025   BLOODU MODERATE*   PHUR 5.5  5.5   PROTEINU 100*   UROBILINOGEN 0.2   NITRU Negative   LEUKOCYTESUR MODERATE*   LABMICR YES   URINETYPE NotGiven      Urine Microscopic:   Recent Labs     05/05/21  1800   WBCUA >100*   RBCUA see below*     Urine Culture:   Recent Labs     05/05/21  1800   LABURIN >50,000 CFU/ml mixed skin/urogenital mitzy. No further workup     Urine Chemistry:   Recent Labs     05/06/21  1613   LABCREA 65.0   NAUR 61             IMAGING:  CT HEAD WO CONTRAST   Final Result   No acute intracranial abnormality. Diffuse atrophic changes with findings suggesting chronic microvascular   ischemia         XR CHEST PORTABLE   Final Result   New bilateral diffuse airspace opacities could represent pulmonary edema or   infection             Assessment/Plan       1.   ANGEL on CKD stage 4         Baseline serum cr ~ 2.9 - 3         Serum Cr 3.2  ----> 3.3         UA > 100 wbc         Avoid NSAIDs       Keep MAP > 65 mmhg       Avoid contrast       Accurate documentation of UOP                   Would decreased lasix to 40 mg daily              2.  Pulmonary edema r/o CHF        Volume status : has no peripheral edema        Could be central hypervolemia         CXR BL opacities : PNA vs Pul edema      BNP > 70K            Decrease lasix to 40 mg IV daily         moniter renal fn while on

## 2021-05-07 NOTE — PROGRESS NOTES
Physician Progress Note      Demario Dela Cruz  [facility] #:                  [id number]  :                       1925  ADMIT DATE:       2021 5:11 PM  DISCH DATE:  RESPONDING  PROVIDER #:        TEJAL CARLSON - LEON          QUERY TEXT:    Pt admitted with acute CHF, pneumonia, UTI. Noted documentation of sepsis on   ED consult note. If possible, please document in progress notes and discharge   summary:    The medical record reflects the following:  Risk Factors: Pneumonia, UTI, ANGEL  Clinical Indicators: WBC 8.7, temp 103.2, HR 89, lactic 2.4, procal 0.45 Per   ED consult note \"FINAL IMPRESSION 1. Severe sepsis\"  Treatment: 500ml fluid bolus, IV Rocephin, IV doxycycline, serial labs, blood   cultures, supportive care, serial labs    Thank you  Gamaliel [place] RN CDS  Options provided:  -- Sepsis confirmed present on arrival  -- Sepsis ruled out  -- Other - I will add my own diagnosis  -- Disagree - Not applicable / Not valid  -- Disagree - Clinically unable to determine / Unknown  -- Refer to Clinical Documentation Reviewer    PROVIDER RESPONSE TEXT:    The diagnosis of sepsis was confirmed as present on arrival.    Query created by: Nicolas Jorge on 2021 12:07 PM      QUERY TEXT:    Pt admitted with acute CHF. If possible, please document in progress notes and   discharge summary further specificity regarding the type of CHF:      The medical record reflects the following:  Risk Factors: Pneumonia, ANGEL, UTI, CKD  Clinical Indicators: Per progress note  \"Acute CHF (congestive heart   failure) BNP markedly elevated 32835, Echo from  showed normal LVEF, grade   1 DD, severe pulmonary hypertension, mod AS\"  Treatment: IV Lasix, daily weights, I&O's, nephrology consult, serial labs,   supportive care. Thank You  Gamaliel [place] RN CDS  [email or web address]. [place]  Options provided:  -- Acute Systolic CHF/HFrEF  -- Acute Diastolic CHF/HFpEF  -- Acute Systolic and Diastolic CHF  -- Acute on Chronic Systolic CHF/HFrEF  -- Acute on Chronic Diastolic CHF/HFpEF  -- Acute on Chronic Systolic and Diastolic CHF  -- Other - I will add my own diagnosis  -- Disagree - Not applicable / Not valid  -- Disagree - Clinically unable to determine / Unknown  -- Refer to Clinical Documentation Reviewer    PROVIDER RESPONSE TEXT:    This patient is in acute on chronic diastolic CHF/HFpEF.     Query created by: Fauzia Carroll on 5/7/2021 12:18 PM      Electronically signed by:  Rocky Banerjee CNP 5/7/2021 12:56 PM

## 2021-05-07 NOTE — PROGRESS NOTES
Occupational Therapy   Occupational Therapy Initial Assessment/Treatment   Date: 2021   Patient Name: David Suarez  MRN: [id number]     : 1925    Date of Service: 2021    Discharge Recommendations:  [address]  OT Equipment Recommendations  Other: defer    Assessment   Performance deficits / Impairments: Decreased functional mobility ; Decreased ADL status; Decreased strength;Decreased endurance;Decreased high-level IADLs;Decreased posture;Decreased safe awareness;Decreased balance    Assessment: Pt 81 yo male functioning with deficits in the areas listed above following increased confusion and weakness. Pt is a questionable historian and family reports they assists with all adls and mobility. Pt required mod A x2 for bed moblity and transfers. Pt required frequent cues for orientation and to attend to task. Pt would benefit from skilled rehab at discharge based on current Ax2 for all functional tasks. Disease Specific Education: Pt educated on importance of OOB mobility, prevention of complications of bedrest, and general safety during hospitalization. Pt verbalized understanding    Prognosis: Fair  Decision Making: Medium Complexity  OT Education: OT Role;Plan of Care;Transfer Training;Orientation; Family Education  REQUIRES OT FOLLOW UP: Yes  Activity Tolerance  Activity Tolerance: Patient limited by fatigue;Treatment limited secondary to decreased cognition;Treatment limited secondary to medical complications (free text)  Safety Devices  Safety Devices in place: Yes  Type of devices: Call light within reach;Gait belt;Left in bed;Bed alarm in place;Nurse notified           Patient Diagnosis(es): The primary encounter diagnosis was Severe sepsis ([facility] [address] Diagnoses of Multifocal pneumonia and Acute on chronic congestive heart failure, unspecified heart failure type [facility]) were also pertinent to this visit.      has a past medical history of Acute CHF (congestive heart failure) [facility]), Arrhythmia, CAD (coronary artery disease), CKD (chronic kidney disease) stage 4, GFR 15-29 ml/min ([facility] [address] and Pneumonia. has a past surgical history that includes Upper gastrointestinal endoscopy (N/A, 2/25/2021). Restrictions  Restrictions/Precautions  Restrictions/Precautions: General Precautions, Fall Risk, Up as Tolerated    Subjective   General  Chart Reviewed: Yes  Patient assessed for rehabilitation services?: Yes  Family / Caregiver Present: No  Referring Practitioner: ALDO Lara CNP  Diagnosis: altered mental status  Vital Signs  Pulse: 61  BP: 134/63  MAP (mmHg): 87  Oxygen Therapy  SpO2: 95 %     Social/Functional History  Social/Functional History  Lives With: Spouse  Type of Home: Facility([facility])  Home Layout: One level  Home Equipment: 4 wheeled walker  ADL Assistance: Independent  Homemaking Assistance: Independent  Ambulation Assistance: Independent(with 7LG)  Transfer Assistance: Independent  Additional Comments: Pt is a questionable historian. Per daughter, pt just moved in the [address] [place] [address] in independent living. Information gather from prior chart review. Objective   Vision: Impaired  Hearing: Exceptions to [facility]  Hearing Exceptions: Hard of hearing/hearing concerns    Orientation  Overall Orientation Status: Impaired  Orientation Level: Oriented to person;Oriented to place; Disoriented to time  Observation/Palpation  Posture: Fair  Body Mechanics: forward flexion, eyes called  Balance  Sitting Balance: Minimal assistance(posterior lean at EOB)  Standing Balance: Dependent/Total(mod X2)  Standing Balance  Time: ~10 minute, ~30 seconds x2  Activity: sitting EOB, standing with RW  Functional Mobility  Functional Mobility Comments: declined due to decreased alert level  ADL  Grooming: Minimal assistance;Verbal cueing; Increased time to complete(assist with washing face)  Toileting: Dependent/Total(jose)  Tone RUE  RUE Tone: Normotonic  Tone LUE  LUE Tone: (05/07/21 1514)    Goals  Short term goals  Time Frame for Short term goals: 1 week (5/14/21)  Short term goal 1: Pt will complete LB dressing with min A. Short term goal 2: Pt will complete toilet transfer with min A. Short term goal 3: Pt will complete 15 reps of BUE exercises for increased endurance. (5/12/21)  Short term goal 4: Pt will complete 5 minutes of dynamic standing for adls with CGA. Patient Goals   Patient goals : \"to get stronger\"       Therapy Time   Individual Concurrent Group Co-treatment   Time In 1423         Time Out 1450         Minutes 27         Timed Code Treatment Minutes: 17 Minutes(10 minutes for evaluation)       Yue Tejada OTR/L    If pt is unable to be seen after this session, please let this note serve as discharge summary. Please see case management note for discharge disposition. Thank you.

## 2021-05-07 NOTE — PROGRESS NOTES
Speech Language Pathology  Facility/Department: [facility] C5 - MED SURG/ORTHO  Dysphagia Daily Treatment Note    NAME: David Suarez  : 1925  MRN: [id number]     Recommended Diet  Diet Solids Recommendation: Dysphagia Soft and Bite-Sized (Dysphagia III)  Liquid Consistency Recommendation: Moderately Thick (Honey)  Recommended Form of Meds: Meds in puree  *Risk Management: assist feed, fully upright, cue to throat clear, cue to double swallow    Patient Diagnosis(es):   Patient Active Problem List    Diagnosis Date Noted    PNA (pneumonia) 2021    Suspected COVID-19 virus infection 2021    Acute CHF (congestive heart failure) ([facility] [address] 2021    CKD (chronic kidney disease) stage 4, GFR 15-29 ml/min ([facility] [address] 2021    [employer] 2021    Essential hypertension 2021    Pulmonary HTN ([facility] [address] 2021    Multiple gastric ulcers     Duodenal bulb ulcer     Coronary artery disease involving native coronary artery of native heart without angina pectoris     Gastrointestinal hemorrhage 2021     Allergies: Allergies   Allergen Reactions    Albuterol      Onset Date: 2021  Current Diet Level:  Soft and bite sized, moderately thick (honey)    Subjective: RN ok'd entry of SLP. Pt fatigued, but cooperative. Pt agreeable to PO trials. Pain: Denies    Current Diet: Dietary Nutrition Supplements: Frozen Oral Supplement  DIET DYSPHAGIA SOFT AND BITE-SIZED; Moderately Thick (Honey)  Dietary Nutrition Supplements: Clear Liquid Oral Supplement    Diet Tolerance:  Patient tolerating current diet level without signs/symptoms of penetration / aspiration. P.O. Trials: Thin       Nectar / Mildly Thick       Honey / Moderately Thick   x 6 sips via cup   Pudding / Extremely Thick       Puree   x 3 tsp bites   Solid   x mech soft - 2x      Dysphagia Treatment and Impressions:  · RN ok'd entry of SLP, reported pt is tolerating recommended diet.    · Pt fatigued, limited eye openings throughout session. Pt did verbalize wants/needs and agreed to PO intake. Pt upright in bed. · SpO2% of 100 prior to PO trials on 2L O2 via NC  · Honey thick liquids - pt tolerated with no overt s/s of aspiration. No coughing, throat clearing, wet vocal quality, change in vitals. Delayed trigger of pharyngeal swallow and reduced laryngeal elevation  · Pureed solids - pt tolerated with no overt s/s of aspiration. Adequate A-P propulsion and oral clearance. Delayed trigger of pharyngeal swallow and reduced laryngeal elevation  · Mech soft solids -  pt tolerated with no overt s/s of aspiration. Adequate mastication, A-P propulsion and oral clearance. Delayed trigger of pharyngeal swallow and reduced laryngeal elevation  · SLP edu pt re: safe swallow strategies - small bites/sips, slow rate, alternate liquids and solids, sit fully upright, swallow hard, swallow twice, cue to throat clear. Pt verbalized understanding but needs reinforcement. · Pt requires max cues to throat clear, double swallow. · SLP continues to recommend dysphagia soft and bite sized, moderately (honey) thick liquid. Meds in puree. Cue pt double swallow and clear throat. Hold PO and contact SLP if s/s of aspiration develop. SLP to continue to follow.     Dysphagia Goals:  Timeframe for Long-term Goals: 7 days (05/13/2021)  Goal 1: Pt will tolerate safest and least restrictive diet with no overt s/s of aspiration - 05/07 - addressed and ongoing - see above    Short-term Goals  Timeframe for Short-term Goals: 5 days (05/11/2021)  Goal 1: The patient will tolerate recommended diet without observed clinical signs of aspiration - 05/07 - addressed and ongoing - see above  Goal 2:  The patient/caregiver will demonstrate understanding of compensatory strategies for improved swallowing safety. - 05/07 - addressed and ongoing - see above    Speech/Language/Cog Goals: N/A    Recommendations:  Diet Solids Recommendation: Dysphagia Soft and Bite-Sized (Dysphagia III)  Liquid Consistency Recommendation: Moderately Thick (Honey)  Recommended Form of Meds: Meds in puree  *Risk Management: assist feed, fully upright, cue to throat clear, cue to double swallow    Patient/Family/Caregiver Education: SLP edu pt re: role of SLP, rationale of PO trials, risk of aspiration, safe swallow strategies, recommended diet. Pt verbalized understanding but needs reinforcement. Compensatory Strategies: Alternate solids and liquids;Eat/Feed slowly;Upright as possible for all oral intake; No straws;Remain upright for 30-45 minutes after meals;Small bites/sips;Swallow 2 times per bite/sip    Plan:    Continued Dysphagia treatment with goals per plan of care. Discharge Recommendations: Pt would benefit from dysphagia tx with home ST or outpatient ST if consistent with pt's POC/wishes    If pt discharges from hospital prior to Speech/Swallowing discharge, this note serves as tx and discharge summary.      Total Treatment Time / Charges     Time in Time out Total Time / units   Cognitive Tx         Speech Tx      Dysphagia Tx 1413 1423 10 min / 1 unit     Signature:  Adell Lesches [place] [id number] [facility] #[id number]  Speech Language Pathologist

## 2021-05-07 NOTE — PROGRESS NOTES
Physical Therapy    Facility/Department: [facility] C5 - MED SURG/ORTHO  Initial Assessment and Treatment    NAME: Dakota Thompson  : 1925  MRN: [id number]    Date of Service: 2021    Discharge Recommendations:  Subacute/Skilled Nursing Facility(If pt declines, recommend 24hr supervision/assist and Home PT.)   PT Equipment Recommendations  Equipment Needed: No  Other: Defer to next level of care. Assessment   Body structures, Functions, Activity limitations: Decreased functional mobility ; Decreased strength;Decreased safe awareness;Decreased cognition;Decreased endurance;Decreased balance;Decreased posture  Assessment: Pt is a 79 y/o male presenting to [facility] with AMS. PTA, pt living at the [facility] with spouse and assist from family and facility staff. Pt ambulates with RW at baseline. Pt unable to describe baseline and completed with family assist. Pt currently requires Ax2 for all functional mobility and will benefit from additional skilled PT to address deficits and increase independence. Recommend SNF at d/c. Treatment Diagnosis: Decreased functional mobility  Prognosis: Fair  Decision Making: Medium Complexity  PT Education: Goals;PT Role;Plan of Care; Functional Mobility Training  Patient Education: Pt will require reinforcement; no evidence of learning. REQUIRES PT FOLLOW UP: Yes  Activity Tolerance  Activity Tolerance: Patient limited by fatigue;Patient limited by endurance; Patient limited by cognitive status  Activity Tolerance: Pt requires frequent cues to redirect. Vitals in chart. Patient Diagnosis(es): The primary encounter diagnosis was Severe sepsis ([facility] [address] Diagnoses of Multifocal pneumonia and Acute on chronic congestive heart failure, unspecified heart failure type [facility]) were also pertinent to this visit.      has a past medical history of Acute CHF (congestive heart failure) ([address] Arrhythmia, CAD (coronary artery disease), CKD (chronic kidney disease) stage 4, GFR 15-29 ml/min ([address] and Pneumonia. has a past surgical history that includes Upper gastrointestinal endoscopy (N/A, 2/25/2021). Restrictions  Restrictions/Precautions  Restrictions/Precautions: General Precautions, Fall Risk, Up as Tolerated  Vision/Hearing  Vision: Impaired  Hearing: Exceptions to [facility]  Hearing Exceptions: Hard of hearing/hearing concerns     Subjective  General  Chart Reviewed: Yes  Patient assessed for rehabilitation services?: Yes  Response To Previous Treatment: Not applicable  Family / Caregiver Present: No(Family entered during session)  Referring Practitioner: ALDO Jaramillo CNP  Referral Date : 05/07/21  Subjective  Subjective: Pt agreeable to therapy. Very lethargic throughout. Pain Screening  Patient Currently in Pain: Denies  Vital Signs  Patient Currently in Pain: Denies       Orientation  Orientation  Overall Orientation Status: Within Functional Limits  Social/Functional History  Social/Functional History  Lives With: Spouse  [facility]  Home Layout: One level  Home Equipment: 4 wheeled walker  ADL Assistance: Independent  Homemaking Assistance: Independent  Ambulation Assistance: Independent(with 1MR)  Transfer Assistance: Independent  Additional Comments: Pt is a questionable historian. Per daughter, pt just moved in the [address] [place] [address] in independent living. Information gather from prior chart review. Cognition   Cognition  Overall Cognitive Status: Exceptions  Arousal/Alertness: Delayed responses to stimuli  Following Commands:  Follows one step commands with repetition  Attention Span: Attends with cues to redirect  Memory: Decreased short term memory;Decreased long term memory  Safety Judgement: Decreased awareness of need for assistance  Problem Solving: Assistance required to generate solutions;Assistance required to identify errors made  Insights: Decreased awareness of deficits  Initiation: Requires cues for some  Sequencing: Requires cues for some  Cognition Comment: decreased alert level    Objective     Observation/Palpation  Posture: Fair  Body Mechanics: forward flexion, eyes closed    AROM RLE (degrees)  RLE AROM: WFL  RLE General AROM: Slight deficits in end ranges  AROM LLE (degrees)  LLE AROM : WFL  LLE General AROM: Slight deficits in end ranges  Strength RLE  Strength RLE: Exception  Comment: Grossly 4-/5  Strength LLE  Strength LLE: Exception  Comment: Grossly 4-/5  Tone RLE  RLE Tone: Normotonic  Tone LLE  LLE Tone: Normotonic  Sensation  Overall Sensation Status: [facility]  Bed mobility  Supine to Sit: Moderate assistance;2 Person assistance  Sit to Supine: Moderate assistance;2 Person assistance  Comment: Cues for technique, increased time for all tasks; HOB elevated to 30 degrees. Transfers  Sit to Stand: Moderate Assistance;2 Person Assistance;Minimal Assistance  Stand to sit: Moderate Assistance;2 Person Assistance;Minimal Assistance  Comment: Mod Ax2 progressing to Min A x2 with RW; pt demos posterior lean requiring cues to correct. Pt slow to complete and fatigued quickly; completed 2x during session. Ambulation  Ambulation?: No(D/t fatigue and high level of assist to stand)     Balance  Posture: Fair  Sitting - Static: Fair  Sitting - Dynamic: Fair;-  Standing - Static: Poor;+  Standing - Dynamic: Poor  Comments: with RW; pt demos posterior lean with physical cues to correct. Plan   Plan  Times per week: 3-5x  Times per day: Daily  Plan weeks: 1 week, by 5/14/21  Current Treatment Recommendations: Strengthening, Balance Training, Functional Mobility Training, Transfer Training, Gait Training, Stair training, Endurance Training, Cognitive Reorientation, Home Exercise Program, Safety Education & Training, Equipment Evaluation, Education, & procurement, Patient/Caregiver Education & Training  Safety Devices  Type of devices:  All fall risk precautions in place, Bed alarm in place, Call light within reach, Left in bed, Patient at risk for falls, Gait luna, Nurse notified    AM-PAC Score  AM-PAC Inpatient Mobility Raw Score : 10 (05/07/21 1548)  AM-PAC Inpatient T-Scale Score : 32.29 (05/07/21 1548)  Mobility Inpatient CMS 0-100% Score: 76.75 (05/07/21 1548)  Mobility Inpatient CMS G-Code Modifier : CL (05/07/21 1548)        Goals  Short term goals  Time Frame for Short term goals: 5-7 days, unless otherwise stated, by 5/14/21  Short term goal 1: Pt will complete bed mobility with Mod A x1. Short term goal 2: Pt will complete sit<>stand with Mod A x1. Short term goal 3: Pt will ambulate 15 feet with RW and Mod A x1  Short term goal 4: Pt will complete 12-15 reps of BLE ther ex for strengthening and balance by 5/10/21. Patient Goals   Patient goals : To go home. Therapy Time   Individual Concurrent Group Co-treatment   Time In 1425         Time Out 1450         Minutes 25         Timed Code Treatment Minutes: 15 Minutes(10 minute eval)     If the pt is discharged prior to the next treatment session, this will serve as the discharge summary.      Suzanne Forrest, PT

## 2021-05-07 NOTE — CARE COORDINATION
CASE MANAGEMENT INITIAL ASSESSMENT      Reviewed chart and completed assessment with:Kath, daughter and spouse at bedside  Explained Case Management role/services. Primary contact information:see below  Health Care Decision Maker :   Primary Decision Maker: Nithin Peters - Child - [phone]    Secondary Decision Maker: cole [place] - Spouse - [phone]     Can this person be reached and be able to respond quickly, such as within a few minutes or hours? Yes  Admit date/status:05/05/2021  Diagnosis:PNA   Is this a Readmission?:  No      Insurance:[facility] Medicare   Precert required for SNF: Yes       3 night stay required: No    Living arrangements, Adls, care needs, prior to admission:Pt lives at THE [facility], active w/[facility] [address] at home:  Walker_x_Cane__RTS__ BSC__Shower Chair__  02__ HHN__ CPAP__  BiPap__  Hospital Bed__ W/C___ Other_morotized scooter_________    Services in the home and/or outpatient, prior to [facility] Notification (HEN):not initiated    Barriers to discharge:none    Plan/commentsCM spoke to daughter at bedside. .  They would like to add extra care on w/[facility] [place] rather than d/c to SNF. CM left SNF list in room in case family changes their mind. CM gave number to daughter to reach out to non skilled [facility] to  add non skillled services. CM will assist w/ transport should it be needed.   Ricky Clemente RN       [facility] on chart for [facility]

## 2021-05-07 NOTE — PROGRESS NOTES
Hospitalist Progress Note      PCP: Ariana Abreu MD    Date of Admission: 5/5/2021    Chief Complaint: Altered mental status    Hospital Course:  80 y.o. male who presented to [facility] with above complaints  Patient presented to the ED via squad for altered mental status. Patient unable to provide any history. EMS was apparently called as the patient was exhibiting altered mental status and unresponsiveness. Spoke to patient's daughter Meg Triana. She reports that patient pulled out the bag from his indwelling Mancuso catheter yesterday, but the catheter apparently remained intact. Later home health care nurse came to change his Mancuso catheter, and when she did he had profuse amounts of gross hematuria. Patient was eventually taken to the urology clinic where they took out his Mancuso and inserted a coudé catheter. Daughter reports that patient has declined since this happened and has become less responsive with some wheezing. She also reports he has a UTI and has been on ciprofloxacin at home. He apparently uses nebulizer twice a day. Not on oxygen at home. Daughter is concerned that the patient lost a lot of blood, he had blood drawn and was told that his Hb was 15, and daughter reports that PCP informed her that patient needed iron infusion, and daughter had scheduled the patient to get iron infusion tomorrow at [facility].       Subjective: Nonspecific complaints. Says he doesn't feel well.     Medications:  Reviewed    Infusion Medications    sodium chloride       Scheduled Medications    [START ON 5/8/2021] furosemide  40 mg Intravenous Daily    aspirin  81 mg Oral Nightly    vitamin B-12  500 mcg Oral Nightly    vitamin B-6  50 mg Oral Nightly    levalbuterol  1.25 mg Nebulization BID    iron sucrose (VENOFER) iv piggyback 100 mL (Admin over 60 minutes)  100 mg Intravenous Q24H    vitamin C  500 mg Oral Daily    atorvastatin  40 mg Oral Nightly    fluticasone  1 spray Each Nostril Daily    folic acid  1 mg Oral Daily    levothyroxine  88 mcg Oral Daily    pantoprazole  40 mg Oral QAM AC    sodium chloride flush  10 mL Intravenous 2 times per day    enoxaparin  40 mg Subcutaneous Daily    cefTRIAXone (ROCEPHIN) IV  1,000 mg Intravenous Q24H    And    doxycycline (VIBRAMYCIN) IV  100 mg Intravenous Q12H     PRN Meds: dextromethorphan-guaiFENesin, sodium chloride flush, sodium chloride, promethazine **OR** ondansetron, polyethylene glycol, acetaminophen **OR** acetaminophen      Intake/Output Summary (Last 24 hours) at 5/7/2021 1604  Last data filed at 5/7/2021 1414  Gross per 24 hour   Intake 390 ml   Output 750 ml   Net -360 ml       Physical Exam Performed:    /63   Pulse 61   Temp 97.7 °F (36.5 °C) (Oral)   Resp 18   Ht 5' 5\" (1.651 m)   Wt 131 lb 1.6 oz (59.5 kg)   SpO2 95%   BMI 21.82 kg/m²     General appearance: Elderly male in no apparent distress, appears stated age and cooperative. HEENT: Pupils equal, round, and reactive to light. Conjunctivae/corneas clear. Neck: Supple, with full range of motion. No jugular venous distention. Trachea midline. Respiratory:  Normal respiratory effort. Clear to auscultation, bilaterally without Rales/Wheezes/Rhonchi. On 2L nc. Cardiovascular: Regular rate and rhythm with normal S1/S2 without murmurs, rubs or gallops. Abdomen: Soft, non-tender, non-distended with normal bowel sounds. Musculoskeletal: No clubbing, cyanosis or edema bilaterally. Full range of motion without deformity. Skin: Skin color, texture, turgor normal.  No rashes or lesions. Neurologic:  Neurovascularly intact without any focal sensory/motor deficits.  Cranial nerves: II-XII intact, grossly non-focal.  Psychiatric: Alert and oriented, thought content appropriate, normal insight  Capillary Refill: Brisk,3 seconds, normal   Peripheral Pulses: +2 palpable, equal bilaterally       Labs:   Recent Labs     05/05/21  1710 05/06/21  0507 05/06/21  0525 WBC 8.7  --  7.6   HGB 10.4* 8.1* 8.1*   HCT 31.8* 24.4* 23.9*     --  219     Recent Labs     05/05/21  1710 05/06/21  0507 05/07/21  0549    139 140   K 4.8 3.9 3.5   CL 98* 101 105   CO2 23 26 24   BUN 45* 51* 63*   CREATININE 3.2* 3.3* 3.6*   CALCIUM 11.7* 10.8* 10.6     Recent Labs     05/05/21  1710   AST 38*   ALT 13   BILITOT 0.5   ALKPHOS 70     No results for input(s): INR in the last 72 hours. Recent Labs     05/05/21  1710 05/05/21  2324 05/06/21  0507   TROPONINI 0.49* 0.58* 0.61*       Urinalysis:      Lab Results   Component Value Date    NITRU Negative 05/05/2021    WBCUA >100 05/05/2021    BACTERIA Rare 02/25/2021    RBCUA see below 05/05/2021    BLOODU MODERATE 05/05/2021    SPECGRAV 1.025 05/05/2021    GLUCOSEU Negative 05/05/2021       Radiology:  CT HEAD WO CONTRAST   Final Result   No acute intracranial abnormality. Diffuse atrophic changes with findings suggesting chronic microvascular   ischemia         XR CHEST PORTABLE   Final Result   New bilateral diffuse airspace opacities could represent pulmonary edema or   infection                 Assessment/Plan:    Active Hospital Problems    Diagnosis    PNA (pneumonia) [J18.9]    Suspected COVID-19 virus infection [Z20.822]    Acute CHF (congestive heart failure) (HCC) [I50.9]    CKD (chronic kidney disease) stage 4, GFR 15-29 ml/min ([facility] [address] [N18.4]    Pacemaker[employer] [Z95.0]    Essential hypertension [I10]    Multiple gastric ulcers [K25.9]    Coronary artery disease involving native coronary artery of native heart without angina pectoris [I25.10]     Acute encephalopathy - improving. Likely metabolic encephalopathy from pneumonia, UTI  CT head negative for any acute abnormality  Monitor mental status  Hold sedative/hypnotics/narcotics     PNA (pneumonia)  Suspected COVID-19 virus infection  CXR showing diffuse bilateral opacities, D-dimer 900, fibrinogen 577 elevated.   Although SARS-CoV-2 rapid was negative, patient could have COVID-19  RT-PCR pending  Continue O2 supplementation  Empiric IV antibiotics  Sputum culture, Gram stain, urine antigens ordered     Acute CHF (congestive heart failure)  BNP markedly elevated 99093 with elevated troponin likely type II NSTEMI due to demand. Echo from 2/21 showed normal LVEF, grade 1 DD, severe pulmonary hypertension, mod AS  IV Lasix daily. Strict I's/O and daily weights  Follow troponin trend     CKD (chronic kidney disease) stage 4, GFR 15-29 ml/min  Consult nephrology for possible diuresis. BNP > 70,000. Gross hematuria/traumatic Mancuso  Monitor for ongoing bleeding  Hold anticoagulants/antiplatelets  Monitor Hb     UTI - was on Cipro prior to admission. UCx negative.     bradycardia S/p pacemaker     Essential hypertension - BP low normal, holding metoprolol, monitor BP     CAD s/p CABG -stable. Troponin appears chronically elevated due to CKD     Hx of GIB - resume PPI     Chronic anemia - Hb 10, monitor. Daughter states patient was supposed to receive iron infusions. Start Venofer 5/6.       DVT Prophylaxis: Lovenox  Diet: Dietary Nutrition Supplements: Frozen Oral Supplement  DIET DYSPHAGIA SOFT AND BITE-SIZED; Moderately Thick (Honey)  Dietary Nutrition Supplements: Clear Liquid Oral Supplement  Code Status: Full Code    PT/OT Eval Status: ordered    Dispo - likely 1-2 days inpatient - discussed with daughter. She confirms patient is a FULL CODE.       ALDO Salvador - CNP

## 2021-05-08 NOTE — PROGRESS NOTES
levothyroxine  88 mcg Oral Daily    pantoprazole  40 mg Oral QAM AC    sodium chloride flush  10 mL Intravenous 2 times per day    enoxaparin  40 mg Subcutaneous Daily    cefTRIAXone (ROCEPHIN) IV  1,000 mg Intravenous Q24H    And    doxycycline (VIBRAMYCIN) IV  100 mg Intravenous Q12H     PRN Meds: dextromethorphan-guaiFENesin, sodium chloride flush, sodium chloride, promethazine **OR** ondansetron, polyethylene glycol, acetaminophen **OR** acetaminophen      Intake/Output Summary (Last 24 hours) at 5/8/2021 1042  Last data filed at 5/7/2021 2318  Gross per 24 hour   Intake 150 ml   Output 675 ml   Net -525 ml       Physical Exam Performed:    BP (!) 120/57   Pulse 61   Temp 98.1 °F (36.7 °C) (Axillary)   Resp 18   Ht 5' 5\" (1.651 m)   Wt 129 lb 11.2 oz (58.8 kg)   SpO2 93%   BMI 21.58 kg/m²     General appearance: Elderly male in no apparent distress, appears stated age and cooperative. HEENT: Pupils equal, round, and reactive to light. Conjunctivae/corneas clear. Neck: Supple, with full range of motion. No jugular venous distention. Trachea midline. Respiratory:  Normal respiratory effort. Clear to auscultation, bilaterally without Rales/Wheezes/Rhonchi. On 2L nc. Cardiovascular: Regular rate and rhythm with normal S1/S2 without murmurs, rubs or gallops. Abdomen: Soft, non-tender, non-distended with normal bowel sounds. Musculoskeletal: No clubbing, cyanosis or edema bilaterally. Full range of motion without deformity. Skin: Skin color, texture, turgor normal.  No rashes or lesions. Neurologic:  Neurovascularly intact without any focal sensory/motor deficits.  Cranial nerves: II-XII intact, grossly non-focal.  Psychiatric: Alert and oriented, thought content appropriate, normal insight  Capillary Refill: Brisk,3 seconds, normal   Peripheral Pulses: +2 palpable, equal bilaterally       Labs:   Recent Labs     05/05/21  1710 05/06/21  0507 05/06/21  0525 05/08/21  0711   WBC 8.7  --  7.6 9.0 patient could have COVID-19  RT-PCR pending  Continue O2 supplementation  Empiric IV antibiotics  Sputum culture, Gram stain, urine antigens ordered     Acute CHF (congestive heart failure)  BNP markedly elevated 66825 with elevated troponin likely type II NSTEMI due to demand. Echo from 2/21 showed normal LVEF, grade 1 DD, severe pulmonary hypertension, mod AS  IV Lasix daily. Strict I's/O and daily weights  Follow troponin trend     CKD (chronic kidney disease) stage 4, GFR 15-29 ml/min  Consult nephrology for possible diuresis. BNP > 70,000. Gross hematuria/traumatic Mancuso  Monitor for ongoing bleeding  Hold anticoagulants/antiplatelets  Monitor Hb     UTI - was on Cipro prior to admission. UCx negative.     bradycardia S/p pacemaker     Essential hypertension - BP low normal, holding metoprolol, monitor BP     CAD s/p CABG -stable. Troponin appears chronically elevated due to CKD     Hx of GIB - resume PPI     Chronic anemia - Hb 10, monitor. Daughter states patient was supposed to receive iron infusions. Start Venofer 5/6.       DVT Prophylaxis: Lovenox  Diet: Dietary Nutrition Supplements: Frozen Oral Supplement  DIET DYSPHAGIA SOFT AND BITE-SIZED; Moderately Thick (Honey)  Dietary Nutrition Supplements: Clear Liquid Oral Supplement  Code Status: Full Code    PT/OT Eval Status: ordered    Dispo - likely 1-2 days inpatient - discussed with daughter. She confirms patient is a FULL CODE.       Atilio Monday, APRN - CNP

## 2021-05-08 NOTE — PROGRESS NOTES
Nephrology Progress  Note                                                                                                                                                                                                                                                                                                                                                               Office : [phone]     Fax :[phone]              Patient's Name: Dakota Thompson  1:18 PM  5/8/2021    Reason for Consult:  CKD stage 4 , hypervolemia evaluation     Requesting Physician:  Jane Rivas MD      Chief Complaint:  AMS      History of Present Ilness:    Dakota Thompson is a 80 y.o. male with  PMh of CKD stage 4 , hyperkalemia     S/p PPM , CAD s/p CABG was brought in with c/o Altered mental status. He is being treated for PNA and AMS    Nephrology consult for CKD and volume management. Interval history :    No sob  No chest pain  No abdominal pain  On lasix  Serum cr stable      Past Medical History:   Diagnosis Date    Acute CHF (congestive heart failure) ([facility] [address] 5/5/2021    Arrhythmia     CAD (coronary artery disease)     CKD (chronic kidney disease) stage 4, GFR 15-29 ml/min ([facility] [address] 5/5/2021    Pneumonia 02/2021       Past Surgical History:   Procedure Laterality Date    UPPER GASTROINTESTINAL ENDOSCOPY N/A 2/25/2021    EGD DIAGNOSTIC ONLY performed by Tony Simpson MD at [address] [place] [address]       No family history on file. reports that he quit smoking about 46 years ago. He has never used smokeless tobacco. He reports previous alcohol use.     Allergies:  Albuterol    Current Medications:    furosemide (LASIX) injection 40 mg, Daily  aspirin EC tablet 81 mg, Nightly  vitamin B-12 (CYANOCOBALAMIN) tablet 500 mcg, Nightly  vitamin B-6 (PYRIDOXINE) tablet 50 mg, Nightly  levalbuterol (XOPENEX) nebulizer solution 1.25 mg, BID  iron sucrose (VENOFER) 100 mg in sodium chloride 0.9 % 100 mL IVPB, Q24H  ascorbic 3.2*   GLUCOSE 172* 135* 134*     Ca/Mg/Phos:   Recent Labs     05/06/21  0507 05/07/21  0549 05/08/21  0711   CALCIUM 10.8* 10.6 10.8*     Hepatic:   Recent Labs     05/05/21  1710   AST 38*   ALT 13   BILITOT 0.5   ALKPHOS 70     Troponin:   Recent Labs     05/05/21  1710 05/05/21  2324 05/06/21  0507   TROPONINI 0.49* 0.58* 0.61*     BNP: No results for input(s): BNP in the last 72 hours. Lipids: No results for input(s): CHOL, TRIG, HDL, LDLCALC, LABVLDL in the last 72 hours. ABGs: No results for input(s): PHART, PO2ART, UAF0VBR in the last 72 hours. INR: No results for input(s): INR in the last 72 hours. UA:  Recent Labs     05/05/21  1800   COLORU Yellow   CLARITYU Clear   GLUCOSEU Negative   BILIRUBINUR Negative   KETUA Negative   SPECGRAV 1.025   BLOODU MODERATE*   PHUR 5.5  5.5   PROTEINU 100*   UROBILINOGEN 0.2   NITRU Negative   LEUKOCYTESUR MODERATE*   LABMICR YES   URINETYPE NotGiven      Urine Microscopic:   Recent Labs     05/05/21  1800   WBCUA >100*   RBCUA see below*     Urine Culture:   Recent Labs     05/05/21  1800   LABURIN >50,000 CFU/ml mixed skin/urogenital mitzy. No further workup     Urine Chemistry:   Recent Labs     05/06/21  1613   LABCREA 65.0   NAUR 61             IMAGING:  CT HEAD WO CONTRAST   Final Result   No acute intracranial abnormality. Diffuse atrophic changes with findings suggesting chronic microvascular   ischemia         XR CHEST PORTABLE   Final Result   New bilateral diffuse airspace opacities could represent pulmonary edema or   infection             Assessment/Plan       1.   ANGEL on CKD stage 4         Baseline serum cr ~ 2.9 - 3         Serum Cr 3.2  ----> 3.3         UA > 100 wbc         Avoid NSAIDs       Keep MAP > 65 mmhg       Avoid contrast       Accurate documentation of UOP           Serum cr trending down          Continue lasix to 40 mg daily              2.  Pulmonary edema r/o CHF        Volume status : has no peripheral edema        Could be central hypervolemia         CXR BL opacities : PNA vs Pul edema      BNP > 70K            Decrease lasix to 40 mg IV daily         moniter renal fn while on diuretics               3. PNA     on rocephin and doxycycline     O2     4.  Acid- base/ Electrolyte imbalance     moniter      5 Acute metabolic encephalopathy    Improving                    Thank you for allowing us to participate in care of [address] free to contact me   [facility] [address]  Office : [phone]  Fax :[phone]

## 2021-05-08 NOTE — PROGRESS NOTES
Speech Language Pathology  Facility/Department: [facility] C5 - MED SURG/ORTHO  Dysphagia Daily Treatment Note    NAME: Arlene Escobar  : 1925  MRN: [id number]     Recommended Diet  Diet Solids Recommendation: Dysphagia Soft and Bite-Sized (Dysphagia III)  Liquid Consistency Recommendation: Moderately Thick (Honey) via tsp; chin tuck recommended   Recommended Form of Meds: Meds in puree  *Risk Management: assist feed, fully upright, cue to throat clear, cue to double swallow    Patient Diagnosis(es):   Patient Active Problem List    Diagnosis Date Noted    PNA (pneumonia) 2021    Suspected COVID-19 virus infection 2021    Acute CH[facility] (congestive heart failure) ([facility] [address] 2021    CKD (chronic kidney disease) stage 4, GFR 15-29 ml/min ([facility] [address] 2021    Pacemaker[employer] 2021    Essential hypertension 2021    Pulmonary HTN ([facility] [address] 2021    Multiple gastric ulcers     Duodenal bulb ulcer     Coronary artery disease involving native coronary artery of native heart without angina pectoris     Gastrointestinal hemorrhage 2021     Allergies: Allergies   Allergen Reactions    Albuterol      Onset Date: 2021  Current Diet Level:  Soft and bite sized, moderately thick (honey) via tsp with chin tuck     Subjective: ANTONINO ok'd entry of SLP. Pt fatigued, but cooperative. Pt agreeable to PO trials. Pain: Denies    Current Diet: Dietary Nutrition Supplements: Frozen Oral Supplement  DIET DYSPHAGIA SOFT AND BITE-SIZED; Moderately Thick (Honey)  Dietary Nutrition Supplements: Clear Liquid Oral Supplement    Diet Tolerance:  Patient tolerating current diet level without signs/symptoms of penetration / aspiration. P.O. Trials: Thin       Nectar / Mildly Thick       Honey / Moderately Thick   x 8 sips via tsp    Pudding / Extremely Thick       Puree       Solid         Dysphagia Treatment and Impressions:  · RN ok'd entry of SLP.    · Pt fatigued, limited eye openings throughout session. Pt did verbalize wants/needs and agreed to PO intake. Pt upright in bed. · PO trials on 2L O2 via NC  · Honey thick liquids - Po trials of honey x8 via tsp with overt s/s of aspiration occurring after the swallow in 4/8 trials when he was performinh a chin tuck. Wet sounding coughing observed. Delayed trigger of pharyngeal swallow and reduced laryngeal elevation also observed. When asked if he was coughing on his meal last night, pt answered \"yes\". · Pureed solids - Did not trial.   · Mech soft solids -  Did not trial.   · SLP edu pt re: safe swallow strategies - Chin tuck with honey; honey via tsp; small bites/sips, slow rate, alternate liquids and solids, sit fully upright, swallow hard, swallow twice, cue to throat clear. Pt did not verbalized understanding possibly d/t fatigue. · Pt requires max cues for the chin tuck, throat clear, and double swallow. · SLP continues to recommend dysphagia soft and bite sized, moderately (honey) thick liquid with assist. Meds in puree. Cue pt chin tuck, double swallow and clear throat. Hold PO and contact SLP if s/s of aspiration develop. SLP to continue to follow. ST informed nurse of new recommendations for swallow strategies and she verbally acknowledged.      Dysphagia Goals:  Timeframe for Long-term Goals: 7 days (05/13/2021)  Goal 1: Pt will tolerate safest and least restrictive diet with no overt s/s of aspiration - 05/08 - addressed and ongoing - see above    Short-term Goals  Timeframe for Short-term Goals: 5 days (05/11/2021)  Goal 1: The patient will tolerate recommended diet without observed clinical signs of aspiration - 05/08 - addressed and ongoing - see above  Goal 2:  The patient/caregiver will demonstrate understanding of compensatory strategies for improved swallowing safety. - 05/08 - addressed and ongoing - see above    Speech/Language/Cog Goals: N/A    Recommendations:  Diet Solids Recommendation: Dysphagia Soft and Bite-Sized (Dysphagia III)  Liquid Consistency Recommendation: Moderately Thick (Honey)  Recommended Form of Meds: Meds in puree  *Risk Management: assist feed, fully upright, cue to throat clear, cue to double swallow, assist with chin tuck. Patient/Family/Caregiver Education: SLP edu pt re: role of SLP, rationale of PO trials, risk of aspiration, safe swallow strategies, recommended diet. Pt did not verbalized understanding and needs reinforcement. Compensatory Strategies: Alternate solids and liquids;Eat/Feed slowly;Upright as possible for all oral intake; No straws;Remain upright for 30-45 minutes after meals;Small bites/sips;Swallow 2 times per bite/sip    Plan:    Continued Dysphagia treatment with goals per plan of care. Discharge Recommendations: Pt would benefit from dysphagia tx with home ST or outpatient ST if consistent with pt's POC/wishes    If pt discharges from hospital prior to Speech/Swallowing discharge, this note serves as tx and discharge summary.      Total Treatment Time / Charges     Time in Time out Total Time / units   Cognitive Tx         Speech Tx      Dysphagia Tx 0730 0800 30 min / 1 unit     Signature:  Venkatesh Pool [address] [facility] SLP   [phone]

## 2021-05-09 NOTE — PLAN OF CARE
Pt educated on the need to turn every 2 hours to prevent any skin integrity breakdown. Pt verbalizes understanding. Pt rolls off of pillows. See documentation.

## 2021-05-09 NOTE — PROGRESS NOTES
Hospitalist Progress Note  Addendum: received message that calcium was 12/ hypercalcemia suspect 2/2 to progressing CKD  - check ionized Ca, unable to give fluids with CKD     PCP: Danielle Costello MD    Date of Admission: 5/5/2021    Chief Complaint: Altered mental status    Hospital Course:  80 y.o. male who presented to [facility] with above complaints  Patient presented to the ED via squad for altered mental status. Patient unable to provide any history. EMS was apparently called as the patient was exhibiting altered mental status and unresponsiveness. Spoke to patient's daughter Lori Cardenas. She reports that patient pulled out the bag from his indwelling Mancuso catheter yesterday, but the catheter apparently remained intact. Later home health care nurse came to change his Mancuso catheter, and when she did he had profuse amounts of gross hematuria. Patient was eventually taken to the urology clinic where they took out his Mancuso and inserted a coudé catheter. Daughter reports that patient has declined since this happened and has become less responsive with some wheezing. She also reports he has a UTI and has been on ciprofloxacin at home. He apparently uses nebulizer twice a day. Not on oxygen at home. Daughter is concerned that the patient lost a lot of blood, he had blood drawn and was told that his Hb was 15, and daughter reports that PCP informed her that patient needed iron infusion, and daughter had scheduled the patient to get iron infusion tomorrow at [facility].       Subjective: No acute events overnight Pt has no new complaints.    Medications:  Reviewed    Infusion Medications    sodium chloride       Scheduled Medications    aspirin  81 mg Oral Daily    furosemide  40 mg Intravenous Daily    vitamin B-12  500 mcg Oral Nightly    vitamin B-6  50 mg Oral Nightly    levalbuterol  1.25 mg Nebulization BID    iron sucrose (VENOFER) iv piggyback 100 mL (Admin over 60 minutes)  100 mg Intravenous Q24H    vitamin C  500 mg Oral Daily    atorvastatin  40 mg Oral Nightly    fluticasone  1 spray Each Nostril Daily    folic acid  1 mg Oral Daily    levothyroxine  88 mcg Oral Daily    pantoprazole  40 mg Oral QAM AC    sodium chloride flush  10 mL Intravenous 2 times per day    enoxaparin  40 mg Subcutaneous Daily    cefTRIAXone (ROCEPHIN) IV  1,000 mg Intravenous Q24H    And    doxycycline (VIBRAMYCIN) IV  100 mg Intravenous Q12H     PRN Meds: dextromethorphan-guaiFENesin, sodium chloride flush, sodium chloride, promethazine **OR** ondansetron, polyethylene glycol, acetaminophen **OR** acetaminophen      Intake/Output Summary (Last 24 hours) at 5/9/2021 0653  Last data filed at 5/9/2021 6307  Gross per 24 hour   Intake    Output 2725 ml   Net -2725 ml       Physical Exam Performed:    BP (!) 144/62   Pulse 63   Temp 98 °F (36.7 °C) (Oral)   Resp 17   Ht 5' 5\" (1.651 m)   Wt 122 lb 12.8 oz (55.7 kg)   SpO2 95%   BMI 20.43 kg/m²     General appearance: Elderly male in no apparent distress, appears stated age and cooperative. HEENT: Pupils equal, round, and reactive to light. Conjunctivae/corneas clear. Neck: Supple, with full range of motion. No jugular venous distention. Trachea midline. Respiratory:  Normal respiratory effort. Clear to auscultation, bilaterally without Rales/Wheezes/Rhonchi. On 2L nc. Cardiovascular: Regular rate and rhythm with normal S1/S2 without murmurs, rubs or gallops. Abdomen: Soft, non-tender, non-distended with normal bowel sounds. Musculoskeletal: No clubbing, cyanosis or edema bilaterally. Full range of motion without deformity. Skin: Skin color, texture, turgor normal.  No rashes or lesions. Neurologic:  Neurovascularly intact without any focal sensory/motor deficits.  Cranial nerves: II-XII intact, grossly non-focal.  Psychiatric: Alert and oriented, thought content appropriate, normal insight  Capillary Refill: Brisk,3 seconds, normal

## 2021-05-09 NOTE — PROGRESS NOTES
endoscopy (N/A, 2/25/2021). Restrictions  Restrictions/Precautions  Restrictions/Precautions: General Precautions, Fall Risk, Up as Tolerated  Subjective   General  Chart Reviewed: Yes  Response To Previous Treatment: Patient with no complaints from previous session. Family / Caregiver Present: Yes  Referring Practitioner: ALDO Victoria CNP  Subjective  Subjective: Pt agreeable to therapy. did not indicate pain. lethargic  General Comment  Comments: cleared by nursing  Vital Signs  Pulse: 61  BP: 135/65  Oxygen Therapy  SpO2: 96 %       Orientation     Cognition      Objective   Bed mobility  Supine to Sit: Maximum assistance  Sit to Supine: Unable to assess  Transfers  Sit to Stand: Moderate Assistance(to stedy and elevated bed)  Stand to sit: Moderate Assistance  Bed to Chair: Dependent/Total(via stedy)  Ambulation  Ambulation?: No     Balance  Posture: Fair  Sitting - Static: Fair  Sitting - Dynamic: Fair  Standing - Static: Fair;-  Exercises  Knee Long Arc Quad: x10 B      AM-PAC Score  AM-PAC Inpatient Mobility Raw Score : 10 (05/09/21 1242)  AM-PAC Inpatient T-Scale Score : 32.29 (05/09/21 1242)  Mobility Inpatient CMS 0-100% Score: 76.75 (05/09/21 1242)  Mobility Inpatient CMS G-Code Modifier : CL (05/09/21 1242)          Goals  Short term goals  Time Frame for Short term goals: 5-7 days, unless otherwise stated, by 5/14/21  Short term goal 1: Pt will complete bed mobility with Mod A x1. 5/9: max A  Short term goal 2: Pt will complete sit<>stand with Mod A x1. 5/9: max  Short term goal 3: Pt will ambulate 15 feet with RW and Mod A x1. 5/9: dep vis stedy  Short term goal 4: Pt will complete 12-15 reps of BLE ther ex for strengthening and balance by 5/10/21. 5/9: too fatigued after transfer  Patient Goals   Patient goals : To go home.     Plan    Plan  Times per week: 3-5x  Times per day: Daily  Plan weeks: 1 week, by 5/14/21  Current Treatment Recommendations: Strengthening, Balance Training, Functional Mobility Training, Transfer Training, Gait Training, Stair training, Endurance Training, Cognitive Reorientation, Home Exercise Program, Safety Education & Training, Equipment Evaluation, Education, & procurement, Patient/Caregiver Education & Training  Safety Devices  Type of devices:  All fall risk precautions in place, Patient at risk for falls, Gait belt, Nurse notified, Left in chair, Chair alarm in place, Call light within reach, Telesitter in use  Restraints  Initially in place: No     Therapy Time   Individual Concurrent Group Co-treatment   Time In 1150         Time Out 1220         Minutes [address] [place], [place]

## 2021-05-09 NOTE — PROGRESS NOTES
Nephrology Progress  Note                                                                                                                                                                                                                                                                                                                                                               Office : [phone]     Fax :[phone]              Patient's Name: Janie Green  1:18 PM  5/9/2021    Reason for Consult:  CKD stage 4 , hypervolemia evaluation     Requesting Physician:  Lydia Kaminsik MD      Chief Complaint:  AMS      History of Present Ilness:    Janie Green is a 80 y.o. male with  PMh of CKD stage 4 , hyperkalemia     S/p PPM , CAD s/p CABG was brought in with c/o Altered mental status. He is being treated for PNA and AMS    Nephrology consult for CKD and volume management. Interval history :    Not in acute distress   No sob  No chest pain  No abdominal pain  On lasix  Serum cr stable      Past Medical History:   Diagnosis Date    Acute CHF (congestive heart failure) ([facility] [address] 5/5/2021    Arrhythmia     CAD (coronary artery disease)     CKD (chronic kidney disease) stage 4, GFR 15-29 ml/min ([facility] [address] 5/5/2021    Pneumonia 02/2021       Past Surgical History:   Procedure Laterality Date    UPPER GASTROINTESTINAL ENDOSCOPY N/A 2/25/2021    EGD DIAGNOSTIC ONLY performed by Lissa Aragon MD at [address] [place] [address]       No family history on file. reports that he quit smoking about 46 years ago. He has never used smokeless tobacco. He reports previous alcohol use.     Allergies:  Albuterol    Current Medications:    aspirin chewable tablet 81 mg, Daily  doxycycline hyclate (VIBRA-TABS) tablet 100 mg, 2 times per day  furosemide (LASIX) injection 40 mg, Daily  vitamin B-12 (CYANOCOBALAMIN) tablet 500 mcg, Nightly  vitamin B-6 (PYRIDOXINE) tablet 50 mg, Nightly  levalbuterol (XOPENEX) nebulizer solution 1.25 mg, BID  iron sucrose (VENOFER) 100 mg in sodium chloride 0.9 % 100 mL IVPB, Q24H  ascorbic acid (VITAMIN C) tablet 500 mg, Daily  atorvastatin (LIPITOR) tablet 40 mg, Nightly  dextromethorphan-guaiFENesin (MUCINEX DM)  MG per extended release tablet 1 tablet, Q12H PRN  fluticasone (FLONASE) 50 MCG/ACT nasal spray 1 spray, Daily  folic acid (FOLVITE) tablet 1 mg, Daily  levothyroxine (SYNTHROID) tablet 88 mcg, Daily  pantoprazole (PROTONIX) tablet 40 mg, QAM AC  sodium chloride flush 0.9 % injection 10 mL, 2 times per day  sodium chloride flush 0.9 % injection 10 mL, PRN  0.9 % sodium chloride infusion, PRN  enoxaparin (LOVENOX) injection 40 mg, Daily  promethazine (PHENERGAN) tablet 12.5 mg, Q6H PRN    Or  ondansetron (ZOFRAN) injection 4 mg, Q6H PRN  polyethylene glycol (GLYCOLAX) packet 17 g, Daily PRN  acetaminophen (TYLENOL) tablet 650 mg, Q6H PRN    Or  acetaminophen (TYLENOL) suppository 650 mg, Q6H PRN        Review of Systems:   14 point ROS obtained but were negative except mentioned in HPI      Physical exam:     Vitals:  /65   Pulse 61   Temp 98 °F (36.7 °C) (Oral)   Resp 16   Ht 5' 5\" (1.651 m)   Wt 122 lb 12.8 oz (55.7 kg)   SpO2 96%   BMI 20.43 kg/m²   Constitutional:  OAA X3 NAD  Skin: no rash, turgor wnl  Heent:  eomi, mmm  Neck: no bruits or jvd noted  Cardiovascular:  S1, S2 without m/r/g  Respiratory: reduced air entry BL    Abdomen:  +bs, soft, nt, nd  Ext:  No lower extremity edema  Psychiatric: mood and affect appropriate  Musculoskeletal:  Rom, muscular strength intact    Data:   Labs:  CBC:   Recent Labs     05/08/21  0711   WBC 9.0   HGB 8.0*        BMP:    Recent Labs     05/07/21  0549 05/08/21  0711    141   K 3.5 3.6    104   CO2 24 23   BUN 63* 58*   CREATININE 3.6* 3.2*   GLUCOSE 135* 134*     Ca/Mg/Phos:   Recent Labs     05/07/21  0549 05/08/21  0711   CALCIUM 10.6 10.8*     Hepatic:   No results for input(s): AST, ALT, ALB, BILITOT, ALKPHOS in the last 72 hours. Troponin:   No results for input(s): TROPONINI in the last 72 hours. BNP: No results for input(s): BNP in the last 72 hours. Lipids: No results for input(s): CHOL, TRIG, HDL, LDLCALC, LABVLDL in the last 72 hours. ABGs: No results for input(s): PHART, PO2ART, NWT4SIR in the last 72 hours. INR: No results for input(s): INR in the last 72 hours. UA:  No results for input(s): Jacquelyn Braulio, GLUCOSEU, BILIRUBINUR, KETUA, SPECGRAV, BLOODU, PHUR, PROTEINU, UROBILINOGEN, NITRU, LEUKOCYTESUR, Tan Couch in the last 72 hours. Urine Microscopic:   No results for input(s): LABCAST, BACTERIA, COMU, HYALCAST, WBCUA, RBCUA, EPIU in the last 72 hours. Urine Culture:   No results for input(s): LABURIN in the last 72 hours. Urine Chemistry:   Recent Labs     05/06/21  1613   LABCREA 65.0   NAUR 61             IMAGING:  CT HEAD WO CONTRAST   Final Result   No acute intracranial abnormality. Diffuse atrophic changes with findings suggesting chronic microvascular   ischemia         XR CHEST PORTABLE   Final Result   New bilateral diffuse airspace opacities could represent pulmonary edema or   infection             Assessment/Plan       1. ANGEL on CKD stage 4         Baseline serum cr ~ 2.9 - 3         Serum Cr 3.2  ----> 3.3         UA > 100 wbc         Avoid NSAIDs       Keep MAP > 65 mmhg       Avoid contrast       Accurate documentation of UOP           Serum cr trending down            Decrease lasix dose to 20 mg daily             2.  Pulmonary edema r/o CHF        Volume status : has no peripheral edema        Could be central hypervolemia         CXR BL opacities : PNA vs Pul edema      BNP > 70K                     moniter renal fn while on diuretics               3. PNA     on rocephin and doxycycline     O2     4.  Acid- base/ Electrolyte imbalance     moniter      5 Acute metabolic encephalopathy    Improving                    Thank you for allowing us to participate in care of [address] [place] free to contact me   [facility] [address]  Office : [phone]  Fax :[phone]

## 2021-05-10 NOTE — PROGRESS NOTES
Nephrology Progress  Note                                                                                                                                                                                                                                                                                                                                                               Office : [phone]     Fax :[phone]              Patient's Name: Dash Friday  10:59 AM  5/10/2021    Reason for Consult:  CKD stage 4 , hypervolemia evaluation     Requesting Physician:  Maggie Pruitt MD      Chief Complaint:  AMS      History of Present Ilness:    Dash Friday is a 80 y.o. male with  PMh of CKD stage 4 , hyperkalemia     S/p PPM , CAD s/p CABG was brought in with c/o Altered mental status. He is being treated for PNA and AMS    Nephrology consult for CKD and volume management. Interval history :    Sitting in chair   On room air   Not in acute distress   No sob  No chest pain  No abdominal pain  On lasix  Serum cr stable      Past Medical History:   Diagnosis Date    Acute CHF (congestive heart failure) ([facility] [address] 5/5/2021    Arrhythmia     CAD (coronary artery disease)     CKD (chronic kidney disease) stage 4, GFR 15-29 ml/min ([facility] [address] 5/5/2021    Pneumonia 02/2021       Past Surgical History:   Procedure Laterality Date    UPPER GASTROINTESTINAL ENDOSCOPY N/A 2/25/2021    EGD DIAGNOSTIC ONLY performed by Balbir Keenan MD at [address] [place] [address]       No family history on file. reports that he quit smoking about 46 years ago. He has never used smokeless tobacco. He reports previous alcohol use.     Allergies:  Albuterol    Current Medications:    lansoprazole suspension SUSP 15 mg, QAM AC  aspirin chewable tablet 81 mg, Daily  doxycycline hyclate (VIBRA-TABS) tablet 100 mg, 2 times per day  vitamin B-12 (CYANOCOBALAMIN) tablet 500 mcg, Nightly  vitamin B-6 (PYRIDOXINE) tablet 50 mg, Nightly levalbuterol (XOPENEX) nebulizer solution 1.25 mg, BID  iron sucrose (VENOFER) 100 mg in sodium chloride 0.9 % 100 mL IVPB, Q24H  ascorbic acid (VITAMIN C) tablet 500 mg, Daily  atorvastatin (LIPITOR) tablet 40 mg, Nightly  dextromethorphan-guaiFENesin (MUCINEX DM)  MG per extended release tablet 1 tablet, Q12H PRN  fluticasone (FLONASE) 50 MCG/ACT nasal spray 1 spray, Daily  folic acid (FOLVITE) tablet 1 mg, Daily  levothyroxine (SYNTHROID) tablet 88 mcg, Daily  sodium chloride flush 0.9 % injection 10 mL, 2 times per day  sodium chloride flush 0.9 % injection 10 mL, PRN  0.9 % sodium chloride infusion, PRN  enoxaparin (LOVENOX) injection 40 mg, Daily  promethazine (PHENERGAN) tablet 12.5 mg, Q6H PRN    Or  ondansetron (ZOFRAN) injection 4 mg, Q6H PRN  polyethylene glycol (GLYCOLAX) packet 17 g, Daily PRN  acetaminophen (TYLENOL) tablet 650 mg, Q6H PRN    Or  acetaminophen (TYLENOL) suppository 650 mg, Q6H PRN        Review of Systems:   14 point ROS obtained but were negative except mentioned in HPI      Physical exam:     Vitals:  BP (!) 148/71   Pulse 61   Temp 98.3 °F (36.8 °C) (Oral)   Resp 18   Ht 5' 5\" (1.651 m)   Wt 122 lb (55.3 kg)   SpO2 96%   BMI 20.30 kg/m²   Constitutional:  OAA X3 NAD  Skin: no rash, turgor wnl  Heent:  eomi, mmm  Neck: no bruits or jvd noted  Cardiovascular:  S1, S2 without m/r/g  Respiratory: reduced air entry BL    Abdomen:  +bs, soft, nt, nd  Ext:  No lower extremity edema  Psychiatric: mood and affect appropriate  Musculoskeletal:  Rom, muscular strength intact    Data:   Labs:  CBC:   Recent Labs     05/08/21  0711 05/10/21  0651   WBC 9.0 7.7   HGB 8.0* 9.9*    348     BMP:    Recent Labs     05/08/21  0711 05/09/21  1402 05/10/21  0651    136 143   K 3.6 3.6 3.7    96* 102   CO2 23 25 27   BUN 58* 61* 66*   CREATININE 3.2* 3.1* 3.3*   GLUCOSE 134* 162* 119*     Ca/Mg/Phos:   Recent Labs     05/08/21  0711 05/09/21  1402 05/10/21  0651   CALCIUM metabolic encephalopathy    Improving                    Thank you for allowing us to participate in care of [address] free to contact me   [facility] [address]  Office : [phone]  Fax :[phone]

## 2021-05-10 NOTE — CARE COORDINATION
Spoke to pt and daughter Keila Hall at bedside- confirmed DC plan of return to [facility] with [facility] and additional services through them. Daughter needs to speak to [place] to arrange all services she is requesting. Continue to follow.   Albert Jolley RN

## 2021-05-10 NOTE — PROGRESS NOTES
Occupational Therapy  Facility/Department: [facility] C5 - MED SURG/ORTHO  Daily Treatment Note  NAME: Kristin Wells  : 1925  MRN: [id number]    Date of Service: 5/10/2021    Discharge Recommendations:  24 hour supervision or assist, Home with Home health OT(daughter reports 24 hr care will be provided at all times at [facility])     Assessment   Performance deficits / Impairments: Decreased functional mobility ; Decreased ADL status; Decreased strength;Decreased endurance;Decreased high-level IADLs;Decreased posture;Decreased safe awareness;Decreased balance;Decreased cognition  Assessment: Session very limited by fatigue, pt minimally alert and with poor ability to participate in OT sesion. Pt max to total A for UE grooming and feeding tasks. Pt requires Ax2 for bed mobility and transfers with RW. Not all liquids on pt tray honey thick, pt and daughter educated on thickening, daughter verbalizes understanding and reports she has enough packets to properly thicken pt drink. Co-tx collaboration this date with PT staff to safely progress pt toward goals. Pt will have better occupational performance outcomes within a co-treatment than 1:1 session. Prognosis: Fair  OT Education: OT Role;Plan of Care;Transfer Training;Orientation; Family Education  Disease Specific Education: Pt educated on importance of OOB mobility, prevention of complications of bedrest, and general safety during hospitalization. Pt unable to verbalize understanding, would benefit from reinforcement. REQUIRES OT FOLLOW UP: Yes  Activity Tolerance  Activity Tolerance: Patient limited by fatigue;Treatment limited secondary to decreased cognition  Activity Tolerance: Vitals: BP= 148/71, HR= 61, SPO2= 96% RA  Safety Devices  Safety Devices in place: Yes  Type of devices: Left in chair;Chair alarm in place;Call light within reach;Nurse notified;Gait belt         Patient Diagnosis(es): The primary encounter diagnosis was Severe sepsis ([facility] [address]  Diagnoses of Multifocal pneumonia and Acute on chronic congestive heart failure, unspecified heart failure type [facility]) were also pertinent to this visit. has a past medical history of Acute CHF (congestive heart failure) ([facility] [address] Arrhythmia, CAD (coronary artery disease), CKD (chronic kidney disease) stage 4, GFR 15-29 ml/min ([facility] [address] and Pneumonia. has a past surgical history that includes Upper gastrointestinal endoscopy (N/A, 2/25/2021). Restrictions  Restrictions/Precautions  Restrictions/Precautions: General Precautions, Fall Risk, Up as Tolerated  Position Activity Restriction  Other position/activity restrictions: jose     Subjective   General  Chart Reviewed: Yes  Patient assessed for rehabilitation services?: Yes  Response to previous treatment: Patient with no complaints from previous session  Family / Caregiver Present: Yes(daughter)  Referring Practitioner: ALDO Murray CNP  Diagnosis: altered mental status    Subjective  Subjective: Pt resting in bed, difficult to arouse, lethargic with limited ability to keep eyes open during session. Pre Treatment Pain Screening  Intervention List: Patient able to continue with treatment  Vital Signs  Patient Currently in Pain: Yes(wincing at times with movement, states yes when asked if pain, unable to rate or specify location)     Orientation  Orientation  Overall Orientation Status: Impaired  Orientation Level: Unable to assess(pt with minimal verbalizations during session)     Objective    ADL  Feeding: Thickened liquids;Maximum assistance;Scoop assist;Bringing food to mouth assist;Verbal cueing  Grooming: Maximum assistance;Verbal cueing; Increased time to complete(hand over hand for facewashing)  Toileting: Dependent/Total(jose)     Balance  Sitting Balance: Contact guard assistance(EOB)  Standing Balance: Dependent/Total(varies from CGA of 1 with RW to min A of 2)  Standing Balance  Activity: bed to chair stand step transfer, static stand with RW ~ 1 min Comment: further standing and mobility deferred d/t decreased level of alertness    Bed mobility  Supine to Sit: Dependent/Total;2 Person assistance     Transfers  Sit to stand: Dependent/Total;2 Person assistance(mod-max A of 2)  Stand to sit: Dependent/Total;2 Person assistance(min A of 2)     Cognition  Overall Cognitive Status: Exceptions  Arousal/Alertness: Delayed responses to stimuli  Following Commands: Inconsistently follows commands  Attention Span: Attends with cues to redirect; Difficulty attending to directions  Memory: Decreased short term memory;Decreased long term memory  Safety Judgement: Decreased awareness of need for assistance  Insights: Decreased awareness of deficits  Initiation: Requires cues for all  Sequencing: Requires cues for all  Cognition Comment: decreased alert level     Plan   Plan  Times per week: 3-5x/wk  Current Treatment Recommendations: Strengthening, Endurance Training, Balance Training, Functional Mobility Training, Safety Education & Training    AM-PAC Score  -[facility] Inpatient Daily Activity Raw Score: 8 (05/10/21 1007)  AM-PAC Inpatient ADL T-Scale Score : 22.86 (05/10/21 1007)  ADL Inpatient CMS 0-100% Score: 85.69 (05/10/21 1007)  ADL Inpatient CMS G-Code Modifier : CM (05/10/21 1007)    Goals  Short term goals  Time Frame for Short term goals: 1 week (5/14/21)  Short term goal 1: Pt will complete LB dressing with min A.-- ongoing 5/10/21  Short term goal 2: Pt will complete toilet transfer with min A.-- ongoing 5/10/21  Short term goal 3: Pt will complete 15 reps of BUE exercises for increased endurance. (5/12/21)-- ongoing 5/10/21  Short term goal 4: Pt will complete 5 minutes of dynamic standing for adls with CGA. -- ongoing 5/10/21  Patient Goals   Patient goals : \"to get stronger\"       Therapy Time   Individual Concurrent Group Co-treatment   Time In  903         Time Out  941         Minutes  [address] CEE/

## 2021-05-10 NOTE — PROGRESS NOTES
Physical Therapy  Facility/Department: [place] [id number] - MED SURG/ORTHO  Daily Treatment Note  NAME: Stoney Wadsworth  : 1925  MRN: [id number]    Date of Service: 5/10/2021    Discharge Recommendations:  Subacute/Skilled Nursing Facility   PT Equipment Recommendations  Equipment Needed: No  Other: Defer to next level of care. Assessment   Body structures, Functions, Activity limitations: Decreased functional mobility ; Decreased strength;Decreased safe awareness;Decreased cognition;Decreased endurance;Decreased balance;Decreased posture  Assessment: Pt transferred with walker bed ot chair but is very lethargic and having hard time keeping eyes open. Dtr wants pt up and insists she will stay right with him for his safety. Pt exs deferred due to fatigue/lethargy. Pt is recommended for con't skilled PT while here in the hospital and at D/C to SNF. Treatment Diagnosis: Decreased functional mobility  Prognosis: Fair  Decision Making: Medium Complexity  PT Education: Goals;PT Role;Plan of Care; Functional Mobility Training;Family Education;General Safety;Orientation;Transfer Training  Patient Education: Pt will require reinforcement; no evidence of learning. Barriers to Learning: lethargy  REQUIRES PT FOLLOW UP: Yes  Activity Tolerance  Activity Tolerance: Patient limited by fatigue;Patient limited by endurance; Patient limited by cognitive status  Activity Tolerance: Vitals:BP= 148/71, HR= 61, SPO2= 96% RA     Patient Diagnosis(es): The primary encounter diagnosis was Severe sepsis ([facility] [address] Diagnoses of Multifocal pneumonia and Acute on chronic congestive heart failure, unspecified heart failure type [facility]) were also pertinent to this visit. has a past medical history of Acute CHF (congestive heart failure) ([address] Arrhythmia, CAD (coronary artery disease), CKD (chronic kidney disease) stage 4, GFR 15-29 ml/min ([address] and Pneumonia.    has a past surgical history that includes Upper gastrointestinal endoscopy (N/A, 2/25/2021). Restrictions  Restrictions/Precautions  Restrictions/Precautions: General Precautions, Fall Risk, Up as Tolerated  Position Activity Restriction  Other position/activity restrictions: damon     Subjective   General  Chart Reviewed: Yes  Response To Previous Treatment: Patient with no complaints from previous session. Family / Caregiver Present: Yes(dtr)  Referring Practitioner: ALDO Russ CNP  Subjective  Subjective: Pt lethargic, unable to keep eyes open, dtr in room and wants pt up. She will stay with him and try to get him to eat. General Comment  Comments: cleared by nursing  Pain Screening  Patient Currently in Pain: Yes(responds yes to questions surrounding pain but cannot specify his pain)  Vital Signs  Patient Currently in Pain: Yes(responds yes to questions surrounding pain but cannot specify his pain)          Objective   Bed mobility  Supine to Sit: Dependent/Total;2 Person assistance  Sit to Supine: Unable to assess  Transfers  Sit to Stand: Dependent/Total;2 Person Assistance;Maximum Assistance  Stand to sit: Dependent/Total;2 Person Assistance;Maximum Assistance  Bed to Chair: Dependent/Total;2 Person Assistance; Moderate assistance(using walker)  Ambulation  Ambulation?: Yes  Ambulation 1  Surface: level tile  Device: Rolling Walker  Assistance: Dependent/Total;2 Person assistance; Moderate assistance  Gait Deviations: Shuffles;Decreased step length;Decreased step height  Distance: 3-4 feet to chair          AM-PAC Score  -PAC Inpatient Mobility Raw Score : 10 (05/10/21 1559)  AM-PAC Inpatient T-Scale Score : 32.29 (05/10/21 1559)  Mobility Inpatient CMS 0-100% Score: 76.75 (05/10/21 1559)  Mobility Inpatient CMS G-Code Modifier : CL (05/10/21 1559)          Goals  Short term goals  Time Frame for Short term goals: 5-7 days, unless otherwise stated, by 5/14/21  Short term goal 1: Pt will complete bed mobility with Mod A x1. 5/10 dependent  Short term goal 2: Pt will complete sit<>stand with Mod A x1. 5/10 max of 2  Short term goal 3: Pt will ambulate 15 feet with RW and Mod A x1. 5/10 mod A of 2 with walker  Short term goal 4: Pt will complete 12-15 reps of BLE ther ex for strengthening and balance by 5/10/21. 5/10: too fatigued after transfer  Patient Goals   Patient goals : To go home. Plan    Plan  Times per week: 3-5x  Times per day: Daily  Plan weeks: 1 week, by 5/14/21  Current Treatment Recommendations: Strengthening, Balance Training, Functional Mobility Training, Transfer Training, Gait Training, Stair training, Endurance Training, Cognitive Reorientation, Home Exercise Program, Safety Education & Training, Equipment Evaluation, Education, & procurement, Patient/Caregiver Education & Training  Safety Devices  Type of devices:  All fall risk precautions in place, Call light within reach, Chair alarm in place, Gait belt, Left in chair, Nurse notified, Jose Barbosa in use  Restraints  Initially in place: No     Therapy Time   Individual Concurrent Group Co-treatment   Time In 0903         Time Out 0933         Minutes [address] [facility], [address] [facility]

## 2021-05-10 NOTE — PROGRESS NOTES
Hospitalist Progress Note  Addendum: received message that calcium was 12/ hypercalcemia suspect 2/2 to progressing CKD  - check ionized Ca, unable to give fluids with CKD     PCP: Ariana Abreu MD    Date of Admission: 5/5/2021    Chief Complaint: Altered mental status    Hospital Course:  80 y.o. male who presented to [facility] with above complaints  Patient presented to the ED via squad for altered mental status. Patient unable to provide any history. EMS was apparently called as the patient was exhibiting altered mental status and unresponsiveness. Spoke to patient's daughter Meg Triana. She reports that patient pulled out the bag from his indwelling Mancuso catheter yesterday, but the catheter apparently remained intact. Later home health care nurse came to change his Mancuso catheter, and when she did he had profuse amounts of gross hematuria. Patient was eventually taken to the urology clinic where they took out his Mancuso and inserted a coudé catheter. Daughter reports that patient has declined since this happened and has become less responsive with some wheezing. She also reports he has a UTI and has been on ciprofloxacin at home. He apparently uses nebulizer twice a day. Not on oxygen at home. Daughter is concerned that the patient lost a lot of blood, he had blood drawn and was told that his Hb was 15, and daughter reports that PCP informed her that patient needed iron infusion, and daughter had scheduled the patient to get iron infusion tomorrow at [facility].       Subjective: No acute events overnight Pt has no new complaints.    Medications:  Reviewed    Infusion Medications    sodium chloride       Scheduled Medications    lansoprazole  15 mg Oral QAM AC    aspirin  81 mg Oral Daily    doxycycline hyclate  100 mg Oral 2 times per day    vitamin B-12  500 mcg Oral Nightly    vitamin B-6  50 mg Oral Nightly    levalbuterol  1.25 mg Nebulization BID    iron sucrose (VENOFER) iv piggyback 100 mL (Admin over 60 minutes)  100 mg Intravenous Q24H    vitamin C  500 mg Oral Daily    atorvastatin  40 mg Oral Nightly    fluticasone  1 spray Each Nostril Daily    folic acid  1 mg Oral Daily    levothyroxine  88 mcg Oral Daily    sodium chloride flush  10 mL Intravenous 2 times per day    enoxaparin  40 mg Subcutaneous Daily     PRN Meds: dextromethorphan-guaiFENesin, sodium chloride flush, sodium chloride, promethazine **OR** ondansetron, polyethylene glycol, acetaminophen **OR** acetaminophen      Intake/Output Summary (Last 24 hours) at 5/10/2021 1633  Last data filed at 5/10/2021 1422  Gross per 24 hour   Intake 85 ml   Output 1625 ml   Net -1540 ml       Physical Exam Performed:    /70   Pulse 63   Temp 97.6 °F (36.4 °C) (Axillary)   Resp 18   Ht 5' 5\" (1.651 m)   Wt 122 lb (55.3 kg)   SpO2 97%   BMI 20.30 kg/m²     General appearance: Elderly male in no apparent distress, appears stated age and cooperative. HEENT: Pupils equal, round, and reactive to light. Conjunctivae/corneas clear. Neck: Supple, with full range of motion. No jugular venous distention. Trachea midline. Respiratory:  Normal respiratory effort. Clear to auscultation, bilaterally without Rales/Wheezes/Rhonchi. On 2L nc. Cardiovascular: Regular rate and rhythm with normal S1/S2 without murmurs, rubs or gallops. Abdomen: Soft, non-tender, non-distended with normal bowel sounds. Musculoskeletal: No clubbing, cyanosis or edema bilaterally. Full range of motion without deformity. Skin: Skin color, texture, turgor normal.  No rashes or lesions. Neurologic:  Neurovascularly intact without any focal sensory/motor deficits.  Cranial nerves: II-XII intact, grossly non-focal.  Psychiatric: Alert and oriented, thought content appropriate, normal insight  Capillary Refill: Brisk,3 seconds, normal   Peripheral Pulses: +2 palpable, equal bilaterally       Labs:   Recent Labs     05/08/21  0711 05/10/21  0651   WBC 9.0 7.7   HGB 8.0* 9.9*   HCT 24.6* 30.4*    348     Recent Labs     05/08/21  0711 05/09/21  1402 05/10/21  0651    136 143   K 3.6 3.6 3.7    96* 102   CO2 23 25 27   BUN 58* 61* 66*   CREATININE 3.2* 3.1* 3.3*   CALCIUM 10.8* 12.2* 11.9*   PHOS  --   --  4.6     No results for input(s): AST, ALT, BILIDIR, BILITOT, ALKPHOS in the last 72 hours. No results for input(s): INR in the last 72 hours. No results for input(s): Leveda Rhymes in the last 72 hours. Urinalysis:      Lab Results   Component Value Date    NITRU Negative 05/05/2021    WBCUA >100 05/05/2021    BACTERIA Rare 02/25/2021    RBCUA see below 05/05/2021    BLOODU MODERATE 05/05/2021    SPECGRAV 1.025 05/05/2021    GLUCOSEU Negative 05/05/2021       Radiology:  CT HEAD WO CONTRAST   Final Result   No acute intracranial abnormality. Diffuse atrophic changes with findings suggesting chronic microvascular   ischemia         XR CHEST PORTABLE   Final Result   New bilateral diffuse airspace opacities could represent pulmonary edema or   infection                 Assessment/Plan:    Active Hospital Problems    Diagnosis    PNA (pneumonia) [J18.9]    Suspected COVID-19 virus infection [Z20.822]    Acute CHF (congestive heart failure) (HCC) [I50.9]    CKD (chronic kidney disease) stage 4, GFR 15-29 ml/min ([facility] [address] [N18.4]    Pacemaker[employer] [Z95.0]    Essential hypertension [I10]    Multiple gastric ulcers [K25.9]    Coronary artery disease involving native coronary artery of native heart without angina pectoris [I25.10]     Acute encephalopathy - improving. Likely metabolic encephalopathy from pneumonia, UTI  CT head negative for any acute abnormality  Monitor mental status  Hold sedative/hypnotics/narcotics     PNA (pneumonia)  Suspected COVID-19 virus infection  CXR showing diffuse bilateral opacities, D-dimer 900, fibrinogen 577 elevated.   Although SARS-CoV-2 rapid was negative, patient could have COVID-19 RT-PCR pending  Continue O2 supplementation  Empiric IV antibiotics change to PO   Sputum culture, Gram stain, urine antigens - NGTD      Acute CHF (congestive heart failure)  BNP markedly elevated 01008 with elevated troponin likely type II NSTEMI due to demand. Echo from 2/21 showed normal LVEF, grade 1 DD, severe pulmonary hypertension, mod AS  IV Lasix daily. 5/10 stopped per nephrology   Strict I's/O and daily weights  Follow troponin trend     CKD (chronic kidney disease) stage 4, GFR 15-29 ml/min  Consult nephrology for possible diuresis. BNP > 70,000. Gross hematuria/traumatic Mancuso- resolved clear yellow urine   Monitor for ongoing bleeding  Hold anticoagulants/antiplatelets  Monitor Hb     UTI - was on Cipro prior to admission. UCx negative.     bradycardia S/p pacemaker     Essential hypertension - BP low normal, holding metoprolol, monitor BP     CAD s/p CABG -stable. Troponin appears chronically elevated due to CKD     Hx of GIB - resume PPI     Chronic anemia - Hb 10, monitor. Daughter states patient was supposed to receive iron infusions. Start Venofer 5/6.       DVT Prophylaxis: Lovenox  Diet: Dietary Nutrition Supplements: Frozen Oral Supplement  DIET DYSPHAGIA SOFT AND BITE-SIZED;  Moderately Thick (Honey)  Dietary Nutrition Supplements: Clear Liquid Oral Supplement  Code Status: Full Code    PT/OT Eval Status: ordered    Dispo - pending clinical course     ALDO Lu - CNP

## 2021-05-10 NOTE — PROGRESS NOTES
Hospitalist Progress Note  Addendum: received message that calcium was 12/ hypercalcemia suspect 2/2 to progressing CKD  - check ionized Ca, unable to give fluids with CKD     PCP: Kj Gomes MD    Date of Admission: 5/5/2021    Chief Complaint: Altered mental status    Hospital Course:  80 y.o. male who [facility]  Patient presented to the ED via squad for altered mental status. Patient unable to provide any history. EMS was apparently called as the patient was exhibiting altered mental status and unresponsiveness. Spoke to patient's daughter Lukas Abel. She reports that patient pulled out the bag from his indwelling Mancuso catheter yesterday, but the catheter apparently remained intact. Later home health care nurse came to change his Mancuso catheter, and when she did he had profuse amounts of gross hematuria. Patient was eventually taken to the urology clinic where they took out his Mancuso and inserted a coudé catheter. Daughter reports that patient has declined since this happened and has become less responsive with some wheezing. She also reports he has a UTI and has been on ciprofloxacin at home. He apparently uses nebulizer twice a day. Not on oxygen at home. Daughter is concerned that the patient lost a lot of blood, he had blood drawn and was told that his Hb was 15, and daughter reports that PCP informed her that patient needed iron infusion, and daughter had scheduled the patient to get iron infusion tomorrow at [facility].       Subjective: No acute events overnight Pt has no new complaints.        Medications:  Reviewed    Infusion Medications    sodium chloride       Scheduled Medications    lansoprazole  15 mg Oral QAM AC    aspirin  81 mg Oral Daily    doxycycline hyclate  100 mg Oral 2 times per day    vitamin B-12  500 mcg Oral Nightly    vitamin B-6  50 mg Oral Nightly    levalbuterol  1.25 mg Nebulization BID    iron sucrose 05/10/21  0651   WBC 9.0 7.7   HGB 8.0* 9.9*   HCT 24.6* 30.4*    348     Recent Labs     05/08/21  0711 05/09/21  1402 05/10/21  0651    136 143   K 3.6 3.6 3.7    96* 102   CO2 23 25 27   BUN 58* 61* 66*   CREATININE 3.2* 3.1* 3.3*   CALCIUM 10.8* 12.2* 11.9*   PHOS  --   --  4.6     No results for input(s): AST, ALT, BILIDIR, BILITOT, ALKPHOS in the last 72 hours. No results for input(s): INR in the last 72 hours. No results for input(s): Alissa Elder in the last 72 hours. Urinalysis:      Lab Results   Component Value Date    NITRU Negative 05/05/2021    WBCUA >100 05/05/2021    BACTERIA Rare 02/25/2021    RBCUA see below 05/05/2021    BLOODU MODERATE 05/05/2021    SPECGRAV 1.025 05/05/2021    GLUCOSEU Negative 05/05/2021       Radiology:  CT HEAD WO CONTRAST   Final Result   No acute intracranial abnormality. Diffuse atrophic changes with findings suggesting chronic microvascular   ischemia         XR CHEST PORTABLE   Final Result   New bilateral diffuse airspace opacities could represent pulmonary edema or   infection                 Assessment/Plan:    Active Hospital Problems    Diagnosis    PNA (pneumonia) [J18.9]    Suspected COVID-19 virus infection [Z20.822]    Acute CHF (congestive heart failure) ([facility]) [I50.9]    CKD (chronic kidney disease) stage 4, GFR 15-29 ml/min ([address] [N18.4]    Pacemaker[employer] [Z95.0]    Essential hypertension [I10]    Multiple gastric ulcers [K25.9]    Coronary artery disease involving native coronary artery of native heart without angina pectoris [I25.10]     Acute encephalopathy - improving. Likely metabolic encephalopathy from pneumonia, UTI  CT head negative for any acute abnormality  Monitor mental status  Hold sedative/hypnotics/narcotics     PNA (pneumonia)  Suspected COVID-19 virus infection  CXR showing diffuse bilateral opacities, D-dimer 900, fibrinogen 577 elevated.   Although SARS-CoV-2 rapid was negative, patient could have COVID-19  RT-PCR WNL  Continue O2 supplementation  Empiric IV antibiotics change to PO   Sputum culture, Gram stain, urine antigens - NGTD      Acute CHF (congestive heart failure)  BNP markedly elevated 59627 with elevated troponin likely type II NSTEMI due to demand. Echo from 2/21 showed normal LVEF, grade 1 DD, severe pulmonary hypertension, mod AS  IV Lasix daily. Strict I's/O and daily weights  Follow troponin trend     CKD (chronic kidney disease) stage 4, GFR 15-29 ml/min  Consult nephrology for possible diuresis. BNP > 70,000. Gross hematuria/traumatic Mancuso- resolved clear yellow urine   Monitor for ongoing bleeding  Hold anticoagulants/antiplatelets  Monitor Hb     UTI - was on Cipro prior to admission. UCx negative.     bradycardia S/p pacemaker     Essential hypertension - BP low normal, holding metoprolol, monitor BP     CAD s/p CABG -stable. Troponin appears chronically elevated due to CKD     Hx of GIB - resume PPI     Chronic anemia - Hb 10, monitor. Daughter states patient was supposed to receive iron infusions. Start Venofer 5/6.       DVT Prophylaxis: Lovenox  Diet: Dietary Nutrition Supplements: Frozen Oral Supplement  DIET DYSPHAGIA SOFT AND BITE-SIZED;  Moderately Thick (Honey)  Dietary Nutrition Supplements: Clear Liquid Oral Supplement  Code Status: Full Code    PT/OT Eval Status: ordered    Dispo - pending clinical course     ALDO Lu - CNP

## 2021-05-11 NOTE — PLAN OF CARE
Problem: Falls - Risk of:  Goal: Will remain free from falls  Description: Will remain free from falls  5/10/2021 2307 by Manuel Frederick RN  Outcome: Ongoing  5/10/2021 1254 by Win Faustin RN  Outcome: Ongoing     Problem: Falls - Risk of:  Goal: Absence of physical injury  Description: Absence of physical injury  5/10/2021 2307 by Manuel Frederick RN  Outcome: Ongoing  5/10/2021 1254 by Win Faustin RN  Outcome: Ongoing

## 2021-05-11 NOTE — PROGRESS NOTES
artery disease), CKD (chronic kidney disease) stage 4, GFR 15-29 ml/min (HCC), and Pneumonia. has a past surgical history that includes Upper gastrointestinal endoscopy (N/A, 2/25/2021). Restrictions  Restrictions/Precautions  Restrictions/Precautions: General Precautions, Fall Risk, Up as Tolerated  Position Activity Restriction  Other position/activity restrictions: jose  Subjective   General  Chart Reviewed: Yes  Patient assessed for rehabilitation services?: Yes  Response to previous treatment: Patient with no complaints from previous session  Family / Caregiver Present: No  Referring Practitioner: ALDO Fuentes CNP  Diagnosis: altered mental status  Subjective  Subjective: Pt resting in bed, difficult to arouse, lethargic with limited ability to keep eyes open during session. Pain Assessment  Response to Pain Intervention: Patient Satisfied  Pre Treatment Pain Screening  Intervention List: Patient able to continue with treatment   Orientation     Objective    ADL  Feeding: Thickened liquids;Maximum assistance;Scoop assist;Bringing food to mouth assist;Verbal cueing  Toileting: Dependent/Total(jose)        Balance  Sitting Balance: Contact guard assistance(transitioning to SBA)  Standing Balance: Minimal assistance(MIN A x 2)  Standing Balance  Time: 2-3 minutes  Activity: transfer from bed, take several steps from walker, provided pt with chair follow,pt unable to ambulate greater than several feet required seat  Comment: with RW  Functional Mobility  Functional - Mobility Device: Rolling Walker  Activity: Other(several steps away from  bed, attempted to take several steps backwards, little success, required need for seat)  Assist Level: Moderate assistance(MIN A x 2)  Bed mobility  Supine to Sit: Dependent/Total;2 Person assistance  Sit to Supine: Unable to assess(pt sitting in recliner)  Transfers  Sit to stand: Minimal assistance; Moderate assistance;2 Person assistance  Stand to sit: Minimal assistance; Moderate assistance;2 Person assistance                       Cognition  Overall Cognitive Status: Exceptions  Arousal/Alertness: Delayed responses to stimuli  Following Commands: Inconsistently follows commands  Attention Span: Attends with cues to redirect; Difficulty attending to directions  Memory: Decreased short term memory;Decreased long term memory  Safety Judgement: Decreased awareness of need for assistance  Problem Solving: Assistance required to generate solutions;Assistance required to identify errors made  Insights: Decreased awareness of deficits  Initiation: Requires cues for all  Sequencing: Requires cues for all              Plan   Plan  Times per week: 3-5x/wk  Current Treatment Recommendations: Strengthening, Endurance Training, Balance Training, Functional Mobility Training, Safety Education & Training    AM-PAC Score        AM-[facility] Inpatient Daily Activity Raw Score: 8 (05/11/21 1345)  AM-PAC Inpatient ADL T-Scale Score : 22.86 (05/11/21 1345)  ADL Inpatient CMS 0-100% Score: 85.69 (05/11/21 1345)  ADL Inpatient CMS G-Code Modifier : CM (05/11/21 1345)    Goals  Short term goals  Time Frame for Short term goals: 1 week (5/14/21)  Short term goal 1: Pt will complete LB dressing with min A.-- ongoing 5/10/21  Short term goal 2: Pt will complete toilet transfer with min A.-- ongoing 5/10/21  Short term goal 3: Pt will complete 15 reps of BUE exercises for increased endurance. (5/12/21)-- ongoing 5/10/21  Short term goal 4: Pt will complete 5 minutes of dynamic standing for adls with CGA. -- ongoing 5/10/21  Patient Goals   Patient goals : \"to get stronger\"       Therapy Time   Individual Concurrent Group Co-treatment   Time In 1140         Time Out 1208         Minutes 28         Timed Code Treatment Minutes: [address]

## 2021-05-11 NOTE — SIGNIFICANT EVENT
Paged about hypercalcemia, noted normal PTH level thus inappropriate because PTH should be inhibited by negative feedback by elevated calcium. Thus added additional labs and imaging. Bolus for volume expansion.

## 2021-05-11 NOTE — PROGRESS NOTES
piggyback 100 mL (Admin over 60 minutes)  100 mg Intravenous Q24H    vitamin C  500 mg Oral Daily    atorvastatin  40 mg Oral Nightly    fluticasone  1 spray Each Nostril Daily    folic acid  1 mg Oral Daily    levothyroxine  88 mcg Oral Daily    sodium chloride flush  10 mL Intravenous 2 times per day    enoxaparin  40 mg Subcutaneous Daily     PRN Meds: dextromethorphan-guaiFENesin, sodium chloride flush, sodium chloride, promethazine **OR** ondansetron, polyethylene glycol, acetaminophen **OR** acetaminophen      Intake/Output Summary (Last 24 hours) at 5/11/2021 1157  Last data filed at 5/11/2021 0903  Gross per 24 hour   Intake 25 ml   Output 1100 ml   Net -1075 ml       Physical Exam Performed:    BP (!) 150/75   Pulse 65   Temp 97.9 °F (36.6 °C) (Axillary)   Resp 18   Ht 5' 5\" (1.651 m)   Wt 113 lb 9.6 oz (51.5 kg)   SpO2 96%   BMI 18.90 kg/m²     General appearance: Elderly male in no apparent distress, appears stated age and cooperative. HEENT: Pupils equal, round, and reactive to light. Conjunctivae/corneas clear. Neck: Supple, with full range of motion. No jugular venous distention. Trachea midline. Respiratory:  Normal respiratory effort. Clear to auscultation, bilaterally without Rales/Wheezes/Rhonchi. On 2L nc. Cardiovascular: Regular rate and rhythm with normal S1/S2 without murmurs, rubs or gallops. Abdomen: Soft, non-tender, non-distended with normal bowel sounds. Musculoskeletal: No clubbing, cyanosis or edema bilaterally. Full range of motion without deformity. Skin: Skin color, texture, turgor normal.  No rashes or lesions. Neurologic:  Neurovascularly intact without any focal sensory/motor deficits.  Cranial nerves: II-XII intact, grossly non-focal.  Psychiatric: Alert and oriented, thought content appropriate, normal insight  Capillary Refill: Brisk,3 seconds, normal   Peripheral Pulses: +2 palpable, equal bilaterally       Labs:   Recent Labs     05/10/21  0651 05/11/21 0542   WBC 7.7 8.3   HGB 9.9* 10.2*   HCT 30.4* 31.0*    372     Recent Labs     05/09/21  1402 05/10/21  0651 05/11/21  0542    143 145   K 3.6 3.7 3.7   CL 96* 102 102   CO2 25 27 29   BUN 61* 66* 70*   CREATININE 3.1* 3.3* 3.1*   CALCIUM 12.2* 11.9* 12.3*   PHOS  --  4.6 5.7*     No results for input(s): AST, ALT, BILIDIR, BILITOT, ALKPHOS in the last 72 hours. No results for input(s): INR in the last 72 hours. No results for input(s): Aly Killings in the last 72 hours. Urinalysis:      Lab Results   Component Value Date    NITRU Negative 05/05/2021    WBCUA >100 05/05/2021    BACTERIA Rare 02/25/2021    RBCUA see below 05/05/2021    BLOODU MODERATE 05/05/2021    SPECGRAV 1.025 05/05/2021    GLUCOSEU Negative 05/05/2021       Radiology:  CT HEAD WO CONTRAST   Final Result   No acute intracranial abnormality. Diffuse atrophic changes with findings suggesting chronic microvascular   ischemia         XR CHEST PORTABLE   Final Result   New bilateral diffuse airspace opacities could represent pulmonary edema or   infection                 Assessment/Plan:    Active Hospital Problems    Diagnosis    PNA (pneumonia) [J18.9]    Suspected COVID-19 virus infection [Z20.822]    [facility]congestive [facility] failure) ([facility]) [I50.9]    CKD (chronic kidney disease) stage 4, GFR 15-29 ml/min ([facility] [address] [N18.4]    Pacemaker[employer] [Z95.0]    Essential hypertension [I10]    Multiple gastric ulcers [K25.9]    Coronary artery disease involving native coronary artery of native heart without angina pectoris [I25.10]     Acute encephalopathy - improving. Likely metabolic encephalopathy from pneumonia, UTI  CT head negative for any acute abnormality  Monitor mental status  Hold sedative/hypnotics/narcotics     PNA (pneumonia)  Suspected COVID-19 virus infection  CXR showing diffuse bilateral opacities, D-dimer 900, fibrinogen 577 elevated.   Although SARS-CoV-2 rapid was negative, patient could

## 2021-05-11 NOTE — PROGRESS NOTES
Physical Therapy  Facility/Department: [facility] C5 - MED SURG/ORTHO  Daily Treatment Note  NAME: Mazin Lyons  : 1925  MRN: [id number]    Date of Service: 2021    Discharge Recommendations:  Subacute/Skilled Nursing Facility   PT Equipment Recommendations  Equipment Needed: No  Other: Defer to next level of care. Assessment   Body structures, Functions, Activity limitations: Decreased functional mobility ; Decreased strength;Decreased safe awareness;Decreased cognition;Decreased endurance;Decreased balance;Decreased posture  Assessment: Pt requires constant cueing in order to remain with eyes open. Pt requires increased assistance of 2 in order to transfer from supine to sitting EOB. Pt requires mod A for amb 6' rw. Pt provided with positioning in recliner to facilitate increased independence in self care feeding. Pt is recommended for con't skilled PT while here in the hospital and at D/C to SNF. Treatment Diagnosis: Decreased functional mobility  Prognosis: Fair  Decision Making: Medium Complexity  Patient Education: Pt will require reinforcement; no evidence of learning. Barriers to Learning: lethargy  REQUIRES PT FOLLOW UP: Yes  Activity Tolerance  Activity Tolerance: Patient limited by fatigue;Patient limited by endurance; Patient limited by cognitive status  Activity Tolerance: /61 supine, sitting 130/69, following activity 136/77     Patient Diagnosis(es): The primary encounter diagnosis was Severe sepsis ([facility] [address] Diagnoses of Multifocal pneumonia and Acute on chronic congestive heart failure, unspecified heart failure type [facility]) were also pertinent to this visit. has a past medical history of Acute CHF (congestive heart failure) ([facility] [address] Arrhythmia, CAD (coronary artery disease), CKD (chronic kidney disease) stage 4, GFR 15-29 ml/min ([address] and Pneumonia. has a past surgical history that includes Upper gastrointestinal endoscopy (N/A, 2021).     Restrictions  Restrictions/Precautions Restrictions/Precautions: General Precautions, Fall Risk, Up as Tolerated  Position Activity Restriction  Other position/activity restrictions: jose  Subjective   General  Chart Reviewed: Yes  Response To Previous Treatment: Patient with no complaints from previous session. Family / Caregiver Present: No  Referring Practitioner: ALDO Storm CNP  Subjective  Subjective: Pt lethargic, unable to keep eyes open  General Comment  Comments: cleared by nursing  Pain Screening  Patient Currently in Pain: Denies  Vital Signs  Patient Currently in Pain: Denies       Orientation  Orientation  Overall Orientation Status: Within Functional Limits  Cognition      Objective   Bed mobility  Supine to Sit: Dependent/Total;2 Person assistance  Sit to Supine: Unable to assess(pt sitting in recliner)  Transfers  Sit to Stand: 2 Person Assistance; Moderate Assistance;Minimal Assistance  Stand to sit: 2 Person Assistance; Moderate Assistance;Minimal Assistance  Bed to Chair: 2 Person Assistance;Minimal assistance(rw)  Ambulation  Ambulation?: Yes  Ambulation 1  Surface: level tile  Device: Rolling Walker  Assistance:  Moderate assistance  Quality of Gait: mildly unsteady w/o LOB  Gait Deviations: Slow Yoselyn;Decreased step length;Decreased step height  Distance: 3-4 feet to chair, 6' fwd/ 3' bwd w/ chair pulled up to pt            AM-PAC Score  AM-PAC Inpatient Mobility Raw Score : 10 (05/11/21 1517)  AM-PAC Inpatient T-Scale Score : 32.29 (05/11/21 1517)  Mobility Inpatient CMS 0-100% Score: 76.75 (05/11/21 1517)  Mobility Inpatient CMS G-Code Modifier : CL (05/11/21 1517)          Goals  Short term goals  Time Frame for Short term goals: 5-7 days, unless otherwise stated, by 5/14/21  Short term goal 1: Pt will complete bed mobility with Mod A x1. 5/10 dependent   -5/11 max 2  Short term goal 2: Pt will complete sit<>stand with Mod A x1. 5/10 max of 2   -5/11 mod/Ted A x 2  Short term goal 3: Pt will ambulate 15 feet with RW and Mod A x1. 5/10 mod A of 2 with walker  -5/11 rw mod A x 6'  Short term goal 4: Pt will complete 12-15 reps of BLE ther ex for strengthening and balance by 5/10/21. 5/10: too fatigued after transfer    -5/11progressing  Patient Goals   Patient goals : To go home. Plan    Plan  Times per week: 3-5x  Times per day: Daily  Plan weeks: 1 week, by 5/14/21  Current Treatment Recommendations: Strengthening, Balance Training, Functional Mobility Training, Transfer Training, Gait Training, Stair training, Endurance Training, Cognitive Reorientation, Home Exercise Program, Safety Education & Training, Equipment Evaluation, Education, & procurement, Patient/Caregiver Education & Training  Safety Devices  Type of devices:  All fall risk precautions in place, Call light within reach, Chair alarm in place, Gait belt, Left in chair, Nurse notified, [place] in use  Restraints  Initially in place: No     Therapy Time   Individual Concurrent Group Co-treatment   Time In 1140         Time Out 1208         Minutes 28         Timed Code Treatment Minutes: [address] [facility] [address] [address] [facility] 9904

## 2021-05-11 NOTE — PROGRESS NOTES
Nephrology Progress  Note                                                                                                                                                                                                                                                                                                                                                               Office : [phone]     Fax :[phone]              Patient's Name: Dash Friday  2:44 PM  5/11/2021    Reason for Consult:  CKD stage 4 , hypervolemia evaluation     Requesting Physician:  Maggie Pruitt MD      Chief Complaint:  AMS      History of Present Ilness:    Dash Friday is a 80 y.o. male with  PMh of CKD stage 4 , hyperkalemia     S/p PPM , CAD s/p CABG was brought in with c/o Altered mental status. He is being treated for PNA and AMS    Nephrology consult for CKD and volume management. Interval history :      Serum Ca elevated   Sitting in chair   On room air   Not in acute distress   No sob  No chest pain  No abdominal pain  On lasix  Serum cr stable      Past Medical History:   Diagnosis Date    Acute CHF (congestive heart failure) ([facility] [address] 5/5/2021    Arrhythmia     CAD (coronary artery disease)     CKD (chronic kidney disease) stage 4, GFR 15-29 ml/min ([facility] [address] 5/5/2021    Pneumonia 02/2021       Past Surgical History:   Procedure Laterality Date    UPPER GASTROINTESTINAL ENDOSCOPY N/A 2/25/2021    EGD DIAGNOSTIC ONLY performed by Balbir Keenan MD at [address] [place] [address]       No family history on file. reports that he quit smoking about 46 years ago. He has never used smokeless tobacco. He reports previous alcohol use.     Allergies:  Albuterol    Current Medications:    calcitonin (MIACALCIN) injection 200 Units, BID  0.9 % sodium chloride infusion, Continuous  lansoprazole suspension SUSP 15 mg, QAM AC  aspirin chewable tablet 81 mg, Daily  doxycycline hyclate (VIBRA-TABS) tablet 100 mg, 2 times per day GLUCOSE 162* 119* 117*     Ca/Mg/Phos:   Recent Labs     05/09/21  1402 05/10/21  0651 05/11/21  0542   CALCIUM 12.2* 11.9* 12.3*   PHOS  --  4.6 5.7*     Hepatic:   No results for input(s): AST, ALT, ALB, BILITOT, ALKPHOS in the last 72 hours. Troponin:   No results for input(s): TROPONINI in the last 72 hours. BNP: No results for input(s): BNP in the last 72 hours. Lipids: No results for input(s): CHOL, TRIG, HDL, LDLCALC, LABVLDL in the last 72 hours. ABGs: No results for input(s): PHART, PO2ART, [id number] in the last 72 hours. INR: No results for input(s): INR in the last 72 hours. UA:  No results for input(s): Kari Keens, GLUCOSEU, BILIRUBINUR, KETUA, SPECGRAV, BLOODU, PHUR, PROTEINU, UROBILINOGEN, NITRU, LEUKOCYTESUR, Idelia Imus in the last 72 hours. Urine Microscopic:   No results for input(s): LABCAST, BACTERIA, COMU, HYALCAST, WBCUA, RBCUA, EPIU in the last 72 hours. Urine Culture:   No results for input(s): LABURIN in the last 72 hours. Urine Chemistry:   No results for input(s): Dion Showers, PROTEINUR, NAUR in the last 72 hours. IMAGING:  CT HEAD WO CONTRAST   Final Result   No acute intracranial abnormality. Diffuse atrophic changes with findings suggesting chronic microvascular   ischemia         XR CHEST PORTABLE   Final Result   New bilateral diffuse airspace opacities could represent pulmonary edema or   infection             Assessment/Plan       1.   ANGEL on CKD stage 4         Baseline serum cr ~ 2.9 - 3         Serum Cr 3.2  ----> 3.3         UA > 100 wbc         Avoid NSAIDs       Keep MAP > 65 mmhg       Avoid contrast       Accurate documentation of UOP           Serum cr close to baseline                       2.  Pulmonary edema r/o CHF        Volume status : has no peripheral edema        Could be central hypervolemia         CXR BL opacities : PNA vs Pul edema      BNP > 70K                moniter renal fn while on diuretics             3. PNA     on rocephin and doxycycline     O2     4.  Acid- base/ Electrolyte imbalance     moniter      5 Acute metabolic encephalopathy    Improving        6 Hypercalcemia    Give IVF NS total 1 L today    Give calcitonin x 2 doses    Check PTH, PTH rP , Vitamin D 25 ( [place] ) level            Thank you for allowing us to participate in care of [address] free to contact me   [facility] [address]  Office : [phone]  Fax :[phone]

## 2021-05-12 NOTE — PROGRESS NOTES
Nephrology Progress  Note                                                                                                                                                                                                                                                                                                                                                               Office : [phone]     Fax :[phone]              Patient's Name: Favian Persaud  4:36 PM  5/12/2021    Reason for Consult:  CKD stage 4 , hypervolemia evaluation     Requesting Physician:  Sylvia Hirsch MD      Chief Complaint:  AMS      History of Present Ilness:    Favian Persaud is a 80 y.o. male with  PMh of CKD stage 4 , hyperkalemia     S/p PPM , CAD s/p CABG was brought in with c/o Altered mental status. He is being treated for PNA and AMS    Nephrology consult for CKD and volume management. Interval history :      Serum Ca trending down  Sitting in chair   On room air   Not in acute distress   No sob  No chest pain  No abdominal pain  Serum cr stable      Past Medical History:   Diagnosis Date    Acute CHF (congestive heart failure) ([facility] [address] 5/5/2021    Arrhythmia     CAD (coronary artery disease)     CKD (chronic kidney disease) stage 4, GFR 15-29 ml/min ([facility] [address] 5/5/2021    Pneumonia 02/2021       Past Surgical History:   Procedure Laterality Date    UPPER GASTROINTESTINAL ENDOSCOPY N/A 2/25/2021    EGD DIAGNOSTIC ONLY performed by Rivera Sandhu MD at [address] [place] [address]       No family history on file. reports that he quit smoking about 46 years ago. He has never used smokeless tobacco. He reports previous alcohol use.     Allergies:  Albuterol    Current Medications:    calcitonin (MIACALCIN) injection 200 Units, BID  lansoprazole suspension SUSP 15 mg, QAM AC  aspirin chewable tablet 81 mg, Daily  doxycycline hyclate (VIBRA-TABS) tablet 100 mg, 2 times per day  vitamin B-12 (CYANOCOBALAMIN) tablet 500 mcg, Nightly  vitamin B-6 (PYRIDOXINE) tablet 50 mg, Nightly  levalbuterol (XOPENEX) nebulizer solution 1.25 mg, BID  iron sucrose (VENOFER) 100 mg in sodium chloride 0.9 % 100 mL IVPB, Q24H  ascorbic acid (VITAMIN C) tablet 500 mg, Daily  atorvastatin (LIPITOR) tablet 40 mg, Nightly  dextromethorphan-guaiFENesin (MUCINEX DM)  MG per extended release tablet 1 tablet, Q12H PRN  fluticasone (FLONASE) 50 MCG/ACT nasal spray 1 spray, Daily  folic acid (FOLVITE) tablet 1 mg, Daily  levothyroxine (SYNTHROID) tablet 88 mcg, Daily  sodium chloride flush 0.9 % injection 10 mL, 2 times per day  sodium chloride flush 0.9 % injection 10 mL, PRN  0.9 % sodium chloride infusion, PRN  enoxaparin (LOVENOX) injection 40 mg, Daily  promethazine (PHENERGAN) tablet 12.5 mg, Q6H PRN    Or  ondansetron (ZOFRAN) injection 4 mg, Q6H PRN  polyethylene glycol (GLYCOLAX) packet 17 g, Daily PRN  acetaminophen (TYLENOL) tablet 650 mg, Q6H PRN    Or  acetaminophen (TYLENOL) suppository 650 mg, Q6H PRN        Review of Systems:   14 point ROS obtained but were negative except mentioned in HPI      Physical exam:     Vitals:  BP (!) 149/63   Pulse 67   Temp 97.3 °F (36.3 °C) (Axillary)   Resp 16   Ht 5' 5\" (1.651 m)   Wt 113 lb (51.3 kg)   SpO2 99%   BMI 18.80 kg/m²   Constitutional:  OAA X3 NAD  Skin: no rash, turgor wnl  Heent:  eomi, mmm  Neck: no bruits or jvd noted  Cardiovascular:  S1, S2 without m/r/g  Respiratory: reduced air entry BL    Abdomen:  +bs, soft, nt, nd  Ext:  No lower extremity edema  Psychiatric: mood and affect appropriate  Musculoskeletal:  Rom, muscular strength intact    Data:   Labs:  CBC:   Recent Labs     05/10/21  0651 05/11/21  0542 05/12/21  0619   WBC 7.7 8.3 8.4   HGB 9.9* 10.2* 9.6*    372 319     BMP:    Recent Labs     05/10/21  0651 05/11/21  0542 05/12/21  0619    145 143   K 3.7 3.7 3.8    102 102   CO2 27 29 25   BUN 66* 70* 69*   CREATININE 3.3* 3.1* 3.3*   GLUCOSE 119* 117* 117* Ca/Mg/Phos:   Recent Labs     05/10/21  0651 05/11/21  0542 05/12/21  0619   CALCIUM 11.9* 12.3* 11.1*   PHOS 4.6 5.7* 5.1*     Hepatic:   No results for input(s): AST, ALT, ALB, BILITOT, ALKPHOS in the last 72 hours. Troponin:   No results for input(s): TROPONINI in the last 72 hours. BNP: No results for input(s): BNP in the last 72 hours. Lipids: No results for input(s): CHOL, TRIG, HDL, LDLCALC, LABVLDL in the last 72 hours. ABGs: No results for input(s): PHART, PO2ART, [id number] in the last 72 hours. INR: No results for input(s): INR in the last 72 hours. UA:  No results for input(s): Gokul Brisk, GLUCOSEU, BILIRUBINUR, KETUA, SPECGRAV, BLOODU, PHUR, PROTEINU, UROBILINOGEN, NITRU, LEUKOCYTESUR, Lesly Lade in the last 72 hours. Urine Microscopic:   No results for input(s): LABCAST, BACTERIA, COMU, HYALCAST, WBCUA, RBCUA, EPIU in the last 72 hours. Urine Culture:   No results for input(s): LABURIN in the last 72 hours. Urine Chemistry:   No results for input(s): Bonne [place]is[place], PROTEINUR, NAUR in the last 72 hours. IMAGING:  CT HEAD WO CONTRAST   Final Result   No acute intracranial abnormality. Diffuse atrophic changes with findings suggesting chronic microvascular   ischemia         XR CHEST PORTABLE   Final Result   New bilateral diffuse airspace opacities could represent pulmonary edema or   infection             Assessment/Plan       1. ANGEL on CKD stage 4         Baseline serum cr ~ 2.9 - 3         Serum Cr 3.2  ----> 3.3         UA > 100 wbc         Avoid NSAIDs       Keep MAP > 65 mmhg       Avoid contrast       Accurate documentation of UOP           Serum cr close to baseline          Hold lasix             2.  Pulmonary edema r/o CHF        Volume status : has no peripheral edema        Could be central hypervolemia         CXR BL opacities : PNA vs Pul edema      BNP > 70K         Hold lasix             3. PNA     on rocephin and doxycycline     O2     4.  Acid- base/ Electrolyte imbalance     moniter      5 Acute metabolic encephalopathy    Improving        6 Hypercalcemia        Give calcitonin x 2 doses on 5/12    Check PTH, PTH rP , Vitamin D 25 ( [place] ) level            Thank you for allowing us to participate in care of [address] free to contact me   [facility] [address]  Office : [phone]  Fax :[phone]

## 2021-05-12 NOTE — CARE COORDINATION
Pt not to d/c today due to abnormal labs. When stable, family would like pt to return to [facility] w/increased [address] and private duty, rather than SNF. CM will continue to follow and will assist w/needs, including possible transport, when stable.   Melissa Ledbetter RN

## 2021-05-12 NOTE — PLAN OF CARE
Nutrition Problem #1: Inadequate oral intake  Intervention: Food and/or Nutrient Delivery: Continue Current Diet, Continue Oral Nutrition Supplement  Nutritional Goals: Consume 50% or greater of 3 meals per day and ONS.

## 2021-05-12 NOTE — PLAN OF CARE
Problem: Falls - Risk of:  Goal: Will remain free from falls  Description: Will remain free from falls  5/12/2021 1148 by Liz Olmstead RN  Outcome: Ongoing     Problem: Falls - Risk of:  Goal: Absence of physical injury  Description: Absence of physical injury  5/12/2021 1148 by Liz Olmstead RN  Outcome: Ongoing     Problem: Skin Integrity:  Goal: Will show no infection signs and symptoms  Description: Will show no infection signs and symptoms  Outcome: Ongoing     Problem: Skin Integrity:  Goal: Absence of new skin breakdown  Description: Absence of new skin breakdown  Outcome: Ongoing     Problem: Nutrition  Goal: Optimal nutrition therapy  Description: Nutrition Problem #1: Inadequate oral intake  Intervention: Food and/or Nutrient Delivery: Continue Current Diet, Start Oral Nutrition Supplement  Nutritional Goals: Consume 50% or greater of 3 meals per day and ONS.       Outcome: Ongoing

## 2021-05-12 NOTE — PROGRESS NOTES
Occupational Therapy  OT follow up attempted pt reporting pain everywhere and requesting to stay in bed. Will continue to follow per POC.   JAYE Ramos/ISREAL

## 2021-05-12 NOTE — PROGRESS NOTES
Comprehensive Nutrition Assessment    Type and Reason for Visit:  Reassess    Nutrition Recommendations/Plan:   1. Encourage po as appropriate, use Magic cups with medications if able  2. Diet per SLP, after discharge consider liberalizing pending decision for palliative care/Hospice at [facility]    Nutrition Assessment:  Follow up:  Refusing meals per nursing. Malnutrition Assessment:  Malnutrition Status: At risk for malnutrition (Comment)(Pt reported wt loss (unsure of how much). Unable to visit pt due to COVID-19.)      Estimated Daily Nutrient Needs:  Energy (kcal):  1171-5801 kcals/day; Weight Used for Energy Requirements:  Ideal(62 kg)     Protein (g):  62-74 g/day; Weight Used for Protein Requirements:  Ideal(1-1.2 g/kg)        Fluid (ml/day):  1800 ml or per MD; Method Used for Fluid Requirements:  1 ml/kcal      Nutrition Related Findings:  BM x 1 on 5/7/21      Wounds:  None(Abrasions.)       Current Nutrition Therapies:    Dietary Nutrition Supplements: Frozen Oral Supplement  DIET DYSPHAGIA SOFT AND BITE-SIZED;  Moderately Thick (Honey)  Dietary Nutrition Supplements: Clear Liquid Oral Supplement    Anthropometric Measures:  · Height: 5' 5\" (165.1 cm)  · Current Body Weight: 113 lb (51.3 kg)   · Admission Body Weight: 131 lb 1 oz (59.4 kg)     · Ideal Body Weight: 136 lbs; % Ideal Body Weight 99.3 %   · BMI: 18.8  · Adjusted Body Weight:  ; No Adjustment   · BMI Categories: Underweight (BMI less than 22) age over 72       Nutrition Diagnosis:   · Inadequate oral intake related to early satiety, swallowing difficulty as evidenced by weight loss, intake 26-50%, intake 51-75%, poor intake prior to admission, swallow study results    Nutrition Interventions:   Food and/or Nutrient Delivery:  Continue Current Diet, Continue Oral Nutrition Supplement  Nutrition Education/Counseling:  No recommendation at this time   Coordination of Nutrition Care:  Continue to monitor while inpatient    Goals:  Consume 50%

## 2021-05-12 NOTE — PROGRESS NOTES
Speech Language Pathology  Facility/Department: [facility] C5 - MED SURG/ORTHO  Dysphagia Daily Treatment Note    NAME: Gerardo Butt  : 1925  MRN: [id number]     Recommended Diet  Solids: Dysphagia Soft and Bite-Sized (Dysphagia III)  Liquids: Moderately Thick (Honey)  Meds: Meds in puree  *Risk Management: assist feed, fully upright, cue to throat clear, cue to double swallow    Patient Diagnosis(es):   Patient Active Problem List    Diagnosis Date Noted    PNA (pneumonia) 2021    Suspected COVID-19 virus infection 2021    Acute [facility] (congestive heart failure) ([facility] 2021    CKD (chronic kidney disease) stage 4, GFR 15-29 ml/min ([facility] 2021    [employer] 2021    Essential hypertension 2021    Pulmonary HTN ([facility] 2021    Multiple gastric ulcers     Duodenal bulb ulcer     Coronary artery disease involving native coronary artery of native heart without angina pectoris     Gastrointestinal hemorrhage 2021     Allergies: Allergies   Allergen Reactions    Albuterol      Onset Date: 2021    Subjective: RN ok'd entry of SLP. Pt fatigued, but cooperative. Pt agreeable to limited PO trials. Pain: denied pain    Current Diet: Dietary Nutrition Supplements: Frozen Oral Supplement  DIET DYSPHAGIA SOFT AND BITE-SIZED; Moderately Thick (Honey)  Dietary Nutrition Supplements: Clear Liquid Oral Supplement    Diet Tolerance:  Patient grossly tolerating current diet level without signs/symptoms of penetration / aspiration. P.O. Trials: Thin       Nectar / Mildly Thick       Honey / Moderately Thick   x Politely declined   Pudding / Extremely Thick       Puree       Solid   x Jello bite x1, pt refused all further trials     Dysphagia Treatment and Impressions:  · Pt with eyes closed, yet alert and responsive. Pt with HOB elevated  · RN ok'd entry and reports no concerns with current diet.  RN reports pt did not eat solid PO this AM with breakfast, however, tolerated HTL via straw sip this AM  · Baseline vocal quality weak, clear, consistent  · Respiratory Status: on RA with O2 at 95-96% with RR 16/min  · Trials:  ? IDDSI 3 Moderately Thick (honey): pt pleasantly declined. RN reports gross tolerance this AM with breakfast  ? IDDSI 6 Soft and Bite Sized: pt accepted one bite of jello. Pt with adequate, yet prolonged mastication of jello bite. Suspected adequate AP bolus propulsion and oral clearance. Pt with suspected reduced initiation and hyolaryngeal excursion. Pt with clear vocal quality and no clinical s/s of aspiration. No coughing/throat clearing and vitals remained stable. Pt refused all further trials. · Education:  SLP edu pt re: role of SLP, rationale of PO trials, risk of aspiration, safe swallow strategies, recommended diet. Pt verbalized understanding and needs reinforcement. · Assessment: Pt appears to be tolerating current diet with no clinical s/s of aspiration. · Recommendations: SLP recommends to continue current diet and med pass   · Risk Management: Alternate solids and liquids;Eat/Feed slowly; No straws;Upright as possible for all oral intake;Remain upright for 30-45 minutes after meals;Small bites/sips;Swallow 2 times per bite/sip. RN made aware of recommendations and this time. If s/s of aspiration develop and/or if respiratory status worsens, hold PO and contact SLP; SLP to follow     Dysphagia Goals:  Timeframe for Long-term Goals: 7 days (05/13/2021)  Goal 1: Pt will tolerate safest and least restrictive diet with no overt s/s of aspiration - 05/12 - addressed and ongoing - see above    Short-term Goals  Timeframe for Short-term Goals: 5 days (05/11/2021)  Goal 1: The patient will tolerate recommended diet without observed clinical signs of aspiration - 05/12 - addressed and ongoing - see above  Goal 2:  The patient/caregiver will demonstrate understanding of compensatory strategies for improved swallowing safety.  - 05/12 - addressed and ongoing - see above    Speech/Language/Cog Goals: N/A    Recommendations:  Solids: Dysphagia Soft and Bite-Sized (Dysphagia III)  Liquids: Moderately Thick (Honey)   Meds: Meds in puree  *Risk Management: assist feed, fully upright, cue to throat clear, cue to double swallow     Patient/Family/Caregiver Education: see above    Compensatory Strategies: Alternate solids and liquids;Eat/Feed slowly; No straws;Upright as possible for all oral intake;Remain upright for 30-45 minutes after meals;Small bites/sips;Swallow 2 times per bite/sip    Plan:    Continued Dysphagia treatment with goals per plan of care. Discharge Recommendations: Pt would benefit from dysphagia tx with home ST or outpatient ST if consistent with pt's POC/wishes    If pt discharges from hospital prior to Speech/Swallowing discharge, this note serves as tx and discharge summary.      Total Treatment Time / Charges     Time in Time out Total Time / units   Cognitive Tx         Speech Tx      Dysphagia Tx 1054 1104 10 min / 1 unit     Signature:  Nanci Junior M.S., [address] [facility], [place]   Speech Language Pathologist  [id number]

## 2021-05-12 NOTE — PROGRESS NOTES
Hospitalist Progress Note  Addendum: received message that calcium was 12/ hypercalcemia suspect 2/2 to progressing CKD  - check ionized Ca, unable to give fluids with CKD     PCP: Doyle Khanna MD    Date of Admission: 5/5/2021    Chief Complaint: Altered mental status    Hospital Course:  80 y.o. male who presented to [facility] with above complaints  Patient presented to the ED via squad for altered mental status. Patient unable to provide any history. EMS was apparently called as the patient was exhibiting altered mental status and unresponsiveness. Spoke to patient's daughter Derick Hodge. She reports that patient pulled out the bag from his indwelling Mancuso catheter yesterday, but the catheter apparently remained intact. Later home health care nurse came to change his Mancuso catheter, and when she did he had profuse amounts of gross hematuria. Patient was eventually taken to the urology clinic where they took out his Mancuso and inserted a coudé catheter. Daughter reports that patient has declined since this happened and has become less responsive with some wheezing. She also reports he has a UTI and has been on ciprofloxacin at home. He apparently uses nebulizer twice a day. Not on oxygen at home. Daughter is concerned that the patient lost a lot of blood, he had blood drawn and was told that his Hb was 15, and daughter reports that PCP informed her that patient needed iron infusion, and daughter had scheduled the patient to get iron infusion tomorrow at [facility].       Subjective:     EMR and notes reviewed pt seen and examined, no new complaints.          Medications:  Reviewed    Infusion Medications    sodium chloride       Scheduled Medications    calcitonin  200 Units Intramuscular BID    lansoprazole  15 mg Oral QAM AC    aspirin  81 mg Oral Daily    doxycycline hyclate  100 mg Oral 2 times per day    vitamin B-12  500 mcg Oral Nightly    vitamin B-6  50 mg Oral Nightly    levalbuterol  1.25 mg Nebulization BID    iron sucrose (VENOFER) iv piggyback 100 mL (Admin over 60 minutes)  100 mg Intravenous Q24H    vitamin C  500 mg Oral Daily    atorvastatin  40 mg Oral Nightly    fluticasone  1 spray Each Nostril Daily    folic acid  1 mg Oral Daily    levothyroxine  88 mcg Oral Daily    sodium chloride flush  10 mL Intravenous 2 times per day    enoxaparin  40 mg Subcutaneous Daily     PRN Meds: dextromethorphan-guaiFENesin, sodium chloride flush, sodium chloride, promethazine **OR** ondansetron, polyethylene glycol, acetaminophen **OR** acetaminophen      Intake/Output Summary (Last 24 hours) at 5/12/2021 0724  Last data filed at 5/12/2021 0326  Gross per 24 hour   Intake    Output 625 ml   Net -625 ml       Physical Exam Performed:    /66   Pulse 67   Temp 97.4 °F (36.3 °C) (Axillary)   Resp 16   Ht 5' 5\" (1.651 m)   Wt 113 lb (51.3 kg)   SpO2 97%   BMI 18.80 kg/m²     General appearance: Elderly male in no apparent distress, appears stated age and cooperative. HEENT: Pupils equal, round, and reactive to light. Conjunctivae/corneas clear. Neck: Supple, with full range of motion. No jugular venous distention. Trachea midline. Respiratory:  Normal respiratory effort. Clear to auscultation, bilaterally without Rales/Wheezes/Rhonchi. On 2L nc. Cardiovascular: Regular rate and rhythm with normal S1/S2 without murmurs, rubs or gallops. Abdomen: Soft, non-tender, non-distended with normal bowel sounds. Musculoskeletal: No clubbing, cyanosis or edema bilaterally. Full range of motion without deformity. Skin: Skin color, texture, turgor normal.  No rashes or lesions. Neurologic:  Neurovascularly intact without any focal sensory/motor deficits.  Cranial nerves: II-XII intact, grossly non-focal.  Psychiatric: Alert and oriented, thought content appropriate, normal insight  Capillary Refill: Brisk,3 seconds, normal   Peripheral Pulses: +2 palpable, equal bilaterally Although SARS-CoV-2 rapid was negative, patient could have COVID-19  RT-PCR WNL  Continue O2 supplementation  Empiric IV antibiotics change to PO   Sputum culture, Gram stain, urine antigens - NGTD      Acute CHF (congestive heart failure)  BNP markedly elevated 89104 with elevated troponin likely type II NSTEMI due to demand. Echo from 2/21 showed normal LVEF, grade 1 DD, severe pulmonary hypertension, mod AS  IV Lasix daily - stopped 5/10  Strict I's/O and daily weights  Follow troponin trend     CKD (chronic kidney disease) stage 4, GFR 15-29 ml/min  Consult nephrology for possible diuresis. BNP > 70,000.  - renal number stable     Hypercalcemia in the setting of above  - giving IVF, Neprhology again following  - pth Vit D labs  - received calcitonin X 2 doses   - repeat labs     Gross hematuria/traumatic Mancuso- resolved clear yellow urine   Monitor for ongoing bleeding  Hold anticoagulants/antiplatelets  Monitor Hb     UTI - was on Cipro prior to admission. UCx negative.     bradycardia S/p pacemaker     Essential hypertension - BP low normal, holding metoprolol, monitor BP     CAD s/p CABG -stable. Troponin appears chronically elevated due to CKD     Hx of GIB - resume PPI     Chronic anemia - Hb 10, monitor. Daughter states patient was supposed to receive iron infusions. Start Venofer 5/6.       DVT Prophylaxis: Lovenox  Diet: Dietary Nutrition Supplements: Frozen Oral Supplement  DIET DYSPHAGIA SOFT AND BITE-SIZED;  Moderately Thick (Honey)  Dietary Nutrition Supplements: Clear Liquid Oral Supplement  Code Status: Full Code    PT/OT Eval Status: ordered    Dispo - suspect return to [address] [place] [address] Rd- pending labs and neprho input     Angelina Paulino, APRN - CNP

## 2021-05-12 NOTE — PLAN OF CARE
Problem: Falls - Risk of:  Goal: Will remain free from falls  Description: Will remain free from falls  5/11/2021 2202 by Katerina Hooper RN  Outcome: Ongoing     Problem: Falls - Risk of:  Goal: Absence of physical injury  Description: Absence of physical injury  5/11/2021 2202 by Katerina Hooper RN  Outcome: Ongoing  5/11/2021 1100 by Va Aleman RN  Outcome: Ongoing

## 2021-05-13 NOTE — PROGRESS NOTES
Nephrology Progress  Note                                                                                                                                                                                                                                                                                                                                                               Office : [phone]     Fax :[phone]              Patient's Name: Quincy Mas  4:20 PM  5/13/2021    Reason for Consult:  CKD stage 4 , hypervolemia evaluation     Requesting Physician:  Ariana Abreu MD      Chief Complaint:  AMS      History of Present Ilness:    Quincy Mas is a 80 y.o. male with  PMh of CKD stage 4 , hyperkalemia     S/p PPM , CAD s/p CABG was brought in with c/o Altered mental status. He is being treated for PNA and AMS    Nephrology consult for CKD and volume management. Interval history :      Serum Ca trending down  Sitting in chair   On room air   Not in acute distress   No sob  No chest pain  No abdominal pain  Serum cr stable      Past Medical History:   Diagnosis Date    Acute CHF (congestive heart failure) ([facility] 5/5/2021    Arrhythmia     CAD (coronary artery disease)     CKD (chronic kidney disease) stage 4, GFR 15-29 ml/min ([facility] 5/5/2021    Pneumonia 02/2021       Past Surgical History:   Procedure Laterality Date    UPPER GASTROINTESTINAL ENDOSCOPY N/A 2/25/2021    EGD DIAGNOSTIC ONLY performed by Myles Parsons MD at [address]       No family history on file. reports that he quit smoking about 46 years ago. He has never used smokeless tobacco. He reports previous alcohol use.     Allergies:  Albuterol    Current Medications:    dextrose 5 % solution, Continuous  [START ON 5/14/2021] heparin (porcine) injection 5,000 Units, 3 times per day  haloperidol lactate (HALDOL) injection 2 mg, Q4H PRN  lansoprazole suspension SUSP 15 mg, QAM AC  aspirin chewable tablet 81 mg, Daily  doxycycline hyclate (VIBRA-TABS) tablet 100 mg, 2 times per day  vitamin B-12 (CYANOCOBALAMIN) tablet 500 mcg, Nightly  vitamin B-6 (PYRIDOXINE) tablet 50 mg, Nightly  levalbuterol (XOPENEX) nebulizer solution 1.25 mg, BID  iron sucrose (VENOFER) 100 mg in sodium chloride 0.9 % 100 mL IVPB, Q24H  ascorbic acid (VITAMIN C) tablet 500 mg, Daily  atorvastatin (LIPITOR) tablet 40 mg, Nightly  dextromethorphan-guaiFENesin (MUCINEX DM)  MG per extended release tablet 1 tablet, Q12H PRN  fluticasone (FLONASE) 50 MCG/ACT nasal spray 1 spray, Daily  folic acid (FOLVITE) tablet 1 mg, Daily  levothyroxine (SYNTHROID) tablet 88 mcg, Daily  sodium chloride flush 0.9 % injection 10 mL, 2 times per day  sodium chloride flush 0.9 % injection 10 mL, PRN  0.9 % sodium chloride infusion, PRN  promethazine (PHENERGAN) tablet 12.5 mg, Q6H PRN    Or  ondansetron (ZOFRAN) injection 4 mg, Q6H PRN  polyethylene glycol (GLYCOLAX) packet 17 g, Daily PRN  acetaminophen (TYLENOL) tablet 650 mg, Q6H PRN    Or  acetaminophen (TYLENOL) suppository 650 mg, Q6H PRN        Review of Systems:   14 point ROS obtained but were negative except mentioned in HPI      Physical exam:     Vitals:  BP (!) 159/82   Pulse 86   Temp 97.1 °F (36.2 °C) (Oral)   Resp 16   Ht 5' 5\" (1.651 m)   Wt 113 lb (51.3 kg)   SpO2 95%   BMI 18.80 kg/m²   Constitutional:  OAA X3 NAD  Skin: no rash, turgor wnl  Heent:  eomi, mmm  Neck: no bruits or jvd noted  Cardiovascular:  S1, S2 without m/r/g  Respiratory: reduced air entry BL    Abdomen:  +bs, soft, nt, nd  Ext:  No lower extremity edema  Psychiatric: mood and affect appropriate  Musculoskeletal:  Rom, muscular strength intact    Data:   Labs:  CBC:   Recent Labs     05/11/21  0542 05/12/21  0619 05/13/21  0608   WBC 8.3 8.4 10.0   HGB 10.2* 9.6* 10.4*    319 383     BMP:    Recent Labs     05/11/21  0542 05/12/21  0619 05/13/21  0608    143 152*   K 3.7 3.8 4.2    102 107   CO2 29 25 28   BUN 70* 69* 68*   CREATININE 3.1* 3.3* 3.3*   GLUCOSE 117* 117* 149*     Ca/Mg/Phos:   Recent Labs     05/11/21  0542 05/12/21  0619 05/13/21  0608   CALCIUM 12.3* 11.1* 11.7*   PHOS 5.7* 5.1* 4.6     Hepatic:   No results for input(s): AST, ALT, ALB, BILITOT, ALKPHOS in the last 72 hours. Troponin:   No results for input(s): TROPONINI in the last 72 hours. BNP: No results for input(s): BNP in the last 72 hours. Lipids: No results for input(s): CHOL, TRIG, HDL, LDLCALC, LABVLDL in the last 72 hours. ABGs: No results for input(s): PHART, PO2ART, PVL1SYT in the last 72 hours. INR: No results for input(s): INR in the last 72 hours. UA:  No results for input(s): Kari Keens, GLUCOSEU, BILIRUBINUR, KETUA, SPECGRAV, BLOODU, PHUR, PROTEINU, UROBILINOGEN, NITRU, LEUKOCYTESUR, Idelia Imus in the last 72 hours. Urine Microscopic:   No results for input(s): LABCAST, BACTERIA, COMU, HYALCAST, WBCUA, RBCUA, EPIU in the last 72 hours. Urine Culture:   No results for input(s): LABURIN in the last 72 hours. Urine Chemistry:   No results for input(s): Dion Showers, PROTEINUR, NAUR in the last 72 hours. IMAGING:  CT HEAD WO CONTRAST   Final Result   No acute intracranial abnormality. Diffuse atrophic changes with findings suggesting chronic microvascular   ischemia         XR CHEST PORTABLE   Final Result   New bilateral diffuse airspace opacities could represent pulmonary edema or   infection             Assessment/Plan       1.   ANGEL on CKD stage 4         Baseline serum cr ~ 2.9 - 3         Serum Cr 3.2  ----> 3.3         UA > 100 wbc         Avoid NSAIDs       Keep MAP > 65 mmhg       Avoid contrast       Accurate documentation of UOP           Serum cr close to baseline          Hold lasix             2.  Pulmonary edema r/o CHF        Volume status : has no peripheral edema        Could be central hypervolemia         CXR BL opacities : PNA vs Pul edema      BNP > 70K         Hold lasix 3. PNA     on rocephin and doxycycline     O2     4.  Acid- base/ Electrolyte imbalance     moniter      5 Acute metabolic encephalopathy    Improving        6 Hypercalcemia        S/p  calcitonin x 2 doses on 5/12    Check PTH, PTH rP , Vitamin D 25 ( [place] ) level      7 Hypernatremia     Give D5 water      Family wants patient to take home , with palliative care          Thank you for allowing us to participate in care of [facility]\"" free to contact me   [facility] of [address]  Office : [phone]  Fax :[phone]

## 2021-05-13 NOTE — PROGRESS NOTES
Pt d/c'd home to [address] [place] [address] Rd. Removed  IV and stopped bleeding. Catheter intact. Pt tolerated well. No redness noted at site. Notified [facility] and removed tele box. Reviewed d/c instructions, home meds, and  f/u information utilizing teach-back method. Discharge instructions discussed with daughter.

## 2021-05-13 NOTE — DISCHARGE INSTR - COC
Continuity of Care Form    Patient Name: Ria Jimenez   :  1925  MRN:  [id number]    Admit date:  2021  Discharge date:  ***    Code Status Order: Full Code   Advance Directives:   Advance Care Flowsheet Documentation       Date/Time Healthcare Directive Type of Healthcare Directive Copy in [address] Agent's Name Healthcare Agent's Phone Number    [phone]  Unknown, patient unable to respond due to medical condition -- -- -- -- --            Admitting Physician:  Roxanne Lewis MD  PCP: Kj Gomes MD    Discharging Nurse: [facility] Unit/Room#: 0272/6012-97  Discharging Unit Phone Number: ***    Emergency Contact:   Extended Emergency Contact Information  Primary Emergency Contact: wash, cole  Mobile Phone: [phone]  Relation: Spouse  Secondary Emergency Contact: Brisa Samaniego  Home Phone: [phone]  Mobile Phone: [phone]  Relation: Child    Past Surgical History:  Past Surgical History:   Procedure Laterality Date    UPPER GASTROINTESTINAL ENDOSCOPY N/A 2021    EGD DIAGNOSTIC ONLY performed by Leonor Hudson MD at [id number] [place] [address]       Immunization History: There is no immunization history on file for this patient.     Active Problems:  Patient Active Problem List   Diagnosis Code    Gastrointestinal hemorrhage K92.2    Multiple gastric ulcers K25.9    Duodenal bulb ulcer K26.9    Coronary artery disease involving native coronary artery of native heart without angina pectoris I25.10    Essential hypertension I10    Pulmonary HTN ([facility] I27.20    Pacemaker[employer] Z95.0    PNA (pneumonia) J18.9    Suspected COVID-19 virus infection Z20.822    Acute CHF (congestive heart failure) (HCC) I50.9    CKD (chronic kidney disease) stage 4, GFR 15-29 ml/min ([facility]) N18.4       Isolation/Infection:   Isolation            No Isolation          Patient Infection Status       Infection Onset Added Last Indicated Last Indicated By Review Planned Expiration Resolved Resolved By    None active    Resolved    COVID-19 Rule Out 05/05/21 05/05/21 05/06/21 COVID-19 (Ordered)   05/06/21 Rule-Out Test Resulted    COVID-19 Rule Out 05/05/21 05/05/21 05/05/21 COVID-19, Rapid (Ordered)   05/05/21 Rule-Out Test Resulted            Nurse Assessment:  Last Vital Signs: BP (!) 167/75   Pulse 73   Temp 97.6 °F (36.4 °C) (Axillary)   Resp 16   Ht 5' 5\" (1.651 m)   Wt 113 lb (51.3 kg)   SpO2 92%   BMI 18.80 kg/m²     Last documented pain score (0-10 scale): Pain Level: 0  Last Weight:   Wt Readings from Last 1 Encounters:   05/12/21 113 lb (51.3 kg)     Mental Status:  disoriented    IV Access:  - None    Nursing Mobility/ADLs:  Walking   Dependent  Transfer  Dependent  Bathing  Dependent  Dressing  Dependent  Toileting  Dependent  Feeding  Dependent  Med Admin  Dependent  Med Delivery   crushed    Wound Care Documentation and Therapy:        Elimination:  Continence:   · Bowel: No  · Bladder: No  Urinary Catheter: Insertion Date: unknown   Colostomy/Ileostomy/Ileal Conduit: No       Date of Last BM:     Intake/Output Summary (Last 24 hours) at 5/13/2021 1143  Last data filed at 5/12/2021 2345  Gross per 24 hour   Intake 247.2 ml   Output 500 ml   Net -252.8 ml     I/O last 3 completed shifts: In: 247.2 [P.O.:75; I.V.:172.2]  Out: 825 [Urine:825]    Safety Concerns:     Sundowners Sundrome and At Risk for Falls    Impairments/Disabilities:      Hearing    Nutrition Therapy:  Current Nutrition Therapy:   - Oral Diet:  Dysphagia 2 mechanically altered    Routes of Feeding: Oral  Liquids: No Restrictions  Daily Fluid Restriction: no  Last Modified Barium Swallow with Video (Video Swallowing Test): not done    Treatments at the Time of Hospital Discharge:   Respiratory Treatments:   Oxygen Therapy:  is on oxygen at 2 L/min per nasal cannula.   Ventilator:    - No ventilator support    Rehab Therapies: Physical Therapy and

## 2021-05-13 NOTE — DISCHARGE SUMMARY
Hospital Medicine Discharge Summary    Patient ID: Quincy Mas      Patient's PCP: Ariana Abreu MD    Admit Date: 5/5/2021     Discharge Date:   5/13/21    Admitting Physician: Burke Murphy MD     Discharge Physician: ALDO Buck - CNP     Discharge Diagnoses: Active Hospital Problems    Diagnosis    PNA (pneumonia) [J18.9]    Suspected COVID-19 virus infection [Z20.822]    Acute CHF (congestive heart failure) ([facility]) [I50.9]    CKD (chronic kidney disease) stage 4, GFR 15-29 ml/min ([facility]) [N18.4]    Pacemaker-[employer] [Z95.0]    Essential hypertension [I10]    Multiple gastric ulcers [K25.9]    Coronary artery disease involving native coronary artery of native heart without angina pectoris [I25.10]       The patient was seen and examined on day of discharge and this discharge summary is in conjunction with any daily progress note from day of discharge. Hospital Course:   80 y. o. male who presented to [facility] with above complaints  Patient presented to the ED via squad for altered mental status.  Patient unable to provide any history.  EMS was apparently called as the patient was exhibiting altered mental status and unresponsiveness.  Spoke to patient's daughter Meg Triana.   She reports that patient pulled out the bag from his indwelling Mancuso catheter yesterday, but the catheter apparently remained intact.  Later home health care nurse came to change his Mancuso catheter, and when she did he had profuse amounts of gross hematuria.  Patient was eventually taken to the urology clinic where they took out his Mancuso and inserted a coudé catheter.  Daughter reports that patient has declined since this happened and has become less responsive with some wheezing.  She also reports he has a UTI and has been on ciprofloxacin at home.  He apparently uses nebulizer twice a day.  Not on oxygen at home.  Daughter is concerned that the patient lost a lot of blood, he had blood drawn and was told that his Hb was 12, and daughter reports that PCP informed her that patient needed iron infusion, and daughter had scheduled the patient to get iron infusion tomorrow at [facility]. Acute encephalopathy - improving. Likely metabolic encephalopathy from pneumonia, UTI  CT head negative for any acute abnormality  Monitor mental status  Hold sedative/hypnotics/narcotics     PNA (pneumonia)  Suspected COVID-19 virus infection  CXR showing diffuse bilateral opacities, D-dimer 900, fibrinogen 577 elevated.  Although SARS-CoV-2 rapid was negative, patient could have COVID-19  RT-PCR WNL  Continue O2 supplementation  Empiric IV antibiotics change to PO   Sputum culture, Gram stain, urine antigens - NGTD      Acute CHF (congestive heart failure)  BNP markedly elevated 75850 with elevated troponin likely type II NSTEMI due to demand. Echo from 2/21 showed normal LVEF, grade 1 DD, severe pulmonary hypertension, mod AS  IV Lasix daily - stopped 5/10  Strict I's/O and daily weights  Follow troponin trend     CKD (chronic kidney disease) stage 4, GFR 15-29 ml/min  Consult nephrology for possible diuresis. BNP > 70,000.  - renal number stable      Hypercalcemia in the setting of above  - giving IVF, Neprhology again following  - pth Vit D labs  - received calcitonin X 2 doses   - repeat labs after some resolution but not normalized, was willing to keep the      Gross hematuria/traumatic Mancuso- resolved clear yellow urine   Monitor for ongoing bleeding  Hold anticoagulants/antiplatelets  Monitor Hb     UTI - was on Cipro prior to admission. UCx negative.     bradycardia S/p pacemaker     Essential hypertension - BP low normal, holding metoprolol, monitor BP     CAD s/p CABG -stable.  Troponin appears chronically elevated due to CKD     Hx of GIB - resume PPI     Chronic anemia - Hb 10, monitor. Daughter states patient was supposed to receive iron infusions. Start Venofer 5/6.       After further consideration and discussion with daughter Jay Davalos she preferred to go with a more palliative \" comfort care \" approach but did not want us to use the word Hospice around her father because he would hear and us and this concerned her. Continued to Educate on the priciples of hospice. Will DC to the [address] [place] [address] with accelerated home care services per family wishes. Labs: For convenience and continuity at follow-up the following most recent labs are provided:      CBC:    Lab Results   Component Value Date    WBC 10.0 05/13/2021    HGB 10.4 05/13/2021    HCT 31.9 05/13/2021     05/13/2021       Renal:    Lab Results   Component Value Date     05/13/2021    K 4.2 05/13/2021    K 3.9 05/06/2021     05/13/2021    CO2 28 05/13/2021    BUN 68 05/13/2021    CREATININE 3.3 05/13/2021    CALCIUM 11.7 05/13/2021    PHOS 4.6 05/13/2021         Significant Diagnostic Studies    Radiology:   CT HEAD WO CONTRAST   Final Result   No acute intracranial abnormality.       Diffuse atrophic changes with findings suggesting chronic microvascular   ischemia         XR CHEST PORTABLE   Final Result   New bilateral diffuse airspace opacities could represent pulmonary edema or   infection                Consults:     IP CONSULT TO HOSPITALIST  IP CONSULT TO NEPHROLOGY    Disposition: Home ( [address] [place] [address] with [facility]     Condition at Discharge: Stable/terminal     Discharge Instructions/Follow-up:      Code Status:  Full Code     Activity: activity as tolerated    Diet: renal diet, with supplements        Discharge Medications:     Current Discharge Medication List           Details   Multiple Vitamins-Minerals (SENIOR MULTIVITAMIN PLUS PO) Take 0.5 tablets by mouth 2 times daily      vitamin B-6 (PYRIDOXINE) 50 MG tablet Take 50 mg by mouth nightly      vitamin B-12 (CYANOCOBALAMIN) 100 MCG tablet Take 100 mcg by mouth nightly      aspirin 81 MG EC tablet Take 81 mg by mouth nightly      isosorbide mononitrate (IMDUR) 30 MG extended release tablet Take 30 mg by mouth daily      levalbuterol (XOPENEX) 1.25 MG/3ML nebulizer solution Take 3 mLs by nebulization every 4 hours as needed for Wheezing or Shortness of Breath Failed albuterol  Qty: 360 mL, Refills: 11      atorvastatin (LIPITOR) 40 MG tablet Take 1 tablet by mouth nightly  Qty: 30 tablet, Refills: 3      metoprolol succinate (TOPROL XL) 25 MG extended release tablet Take 0.5 tablets by mouth daily  Qty: 30 tablet, Refills: 3      pantoprazole (PROTONIX) 40 MG tablet Take 1 tablet by mouth every morning (before breakfast)  Qty: 30 tablet, Refills: 3      ipratropium-albuterol (DUONEB) 0.5-2.5 (3) MG/3ML SOLN nebulizer solution Inhale 1 vial into the lungs every 4 hours      levothyroxine (SYNTHROID) 88 MCG tablet Take 88 mcg by mouth Daily      coenzyme Q-10 100 MG capsule Take 100 mg by mouth daily      Flaxseed, Linseed, (FLAXSEED OIL) 1000 MG CAPS Take by mouth      fluticasone (FLONASE) 50 MCG/ACT nasal spray 1 spray by Each Nostril route daily      folic acid (FOLVITE) 1 MG tablet Take 1 mg by mouth daily      dextromethorphan-guaiFENesin (MUCINEX DM)  MG per extended release tablet Take 1 tablet by mouth every 12 hours as needed 1/2 tab daily      Ascorbic Acid (VITAMIN C) 250 MG tablet Take 500 mg by mouth daily             Time Spent on discharge is more than 30 minutes in the examination, evaluation, counseling and review of medications and discharge plan. Signed:    ALDO Gilbert CNP   5/13/2021      Thank you Maggie Pruitt MD for the opportunity to be involved in this patient's care. If you have any questions or concerns please feel free to contact me at [phone].

## 2021-05-13 NOTE — CARE COORDINATION
CASE MANAGEMENT DISCHARGE SUMMARY      [facility]. Referral placed to [address] [place] [address] on this day for home evaluation. Daughter Joaquim Granados in agreement. New Durable Medical Equipment ordered/agency: New home O2 arranged today with Mar[facility]. Transportation:    Family/car: [employer] explained to [employer]. Pt/family voice no agency preference. Agency used: Khushboo Hutchinson  up time: 36   Ambulance form completed: Yes    Confirmed discharge plan with: Met with shital Granados at bedside. Patient: yes     Facility/Agency, name:  [facility] faxed- Spoke to Lorne Modi with [facility]. RN, name: Antonio Healy    Note: Discharging nurse to complete [facility], reconcile [facility], and place final copy with patient's discharge packet. RN to ensure that written prescriptions for  Level II medications are sent with patient to the facility as per protocol.

## 2021-05-14 NOTE — PROGRESS NOTES
Oxygen documentation:     O2 saturation at REST on ROOM AIR = __82____%     If saturation is 89% or above please proceed with steps 2 and 3. .........      O2 saturation with AMBULATION of _____ feet on ROOM AIR = ____%   O2 saturation with AMBULATION on current 3 liter flow = ______%     DCP notified: ______     Signature: _________________________ Date: __________   Time: ______   Last edited by Eladio Klein RN on 05/13/21 at 8038

## 2021-05-14 NOTE — TELEPHONE ENCOUNTER
[address] FAILURE PROGRAM    SYMPTOM ASSESSMENT: Post discharge follow-up phone call made to pt  Catherine Vieyra . Pita Kumar denies shortness of breath, edema, and difficulty sleeping; stated he has been feeling \"weak\" since discharge. Discharge weight was 113; today's weight was unknown- pt did not weigh self. MEDICATION REVIEW: Pt was able to fill all prescriptions and states he has been taking medications as prescribed. Reviewed patient medications; no discrepancies identified. Pita Kumar asked about antibiotics that was reviewed and determined pt was not d/c'd with prescriptions as course appears to be complete. FOLLOW-UP APPOINTMENT: Reminder made of their appointment with Andres Lopez MD scheduled for 5/17/2021. Transportation is arranged with family. EDUCATION: Educated pt Pita SAHU on sodium restriction of <2500 mg and fluid restriction of < 64 oz, daily weights, follow-up, and medication compliance. Reviewed recommended level of activity. Advised on smoking cessation benefits. Notified to call the doctor post discharge if they experiences shortness of breath, chest pain, swelling, cough, or weight gain or loss of 2-3 pounds in a day/5 pounds in a week. Also notified to call the doctor if he feels dizzy, increased fatigue, decreased or difficulty urinating. Reviewed the red, yellow and green zones of HF management. Pt Jessica Teresa verbalized understanding. She stated they will call the doctor with any questions or signs of symptom worsening. No additional questions at this time. HF resource number made available for non-urgent questions. LIFESTYLE GOAL DISCUSSED: reduce fluids and sodium, weigh daily when pt can tolerate, increase activity when pt can tolerate.     Electronically signed by Garima Arvizu RN on 5/14/2021 at 2:49 PM

## 2021-05-14 NOTE — PROGRESS NOTES
Patient picked up by [facility] [employer] [facility]. Patient's oxygen tank sent with patient. RN called daughter to inform her that patient has been discharged from hospital and on his way to the [facility].

## 2021-05-27 PROBLEM — R41.89 UNRESPONSIVE: Status: ACTIVE | Noted: 2021-01-01

## 2021-05-27 NOTE — ED PROVIDER NOTES
GASTROINTESTINAL ENDOSCOPY N/A 2021    EGD DIAGNOSTIC ONLY performed by Fernando Degroot MD at [facility]     No family history on file. Social History     Socioeconomic History    Marital status:      Spouse name: Not on file    Number of children: Not on file    Years of education: Not on file    Highest education level: Not on file   Occupational History    Not on file   Tobacco Use    Smoking status: Former Smoker     Quit date: 1975     Years since quittin.4    Smokeless tobacco: Never Used    Tobacco comment: smoked for 30 years   Vaping Use    Vaping Use: Never used   Substance and Sexual Activity    Alcohol use: Not Currently    Drug use: Not on file    Sexual activity: Not on file   Other Topics Concern    Not on file   Social History Narrative    Not on file     Social Determinants of Health     Financial Resource Strain:     Difficulty of Paying Living Expenses:    Food Insecurity:     Worried About [address] in the Last Year:     [address] in the Last Year:    Transportation Needs:     Lack of Transportation (Medical):      Lack of Transportation (Non-Medical):    Physical Activity:     Days of Exercise per Week:     Minutes of Exercise per Session:    Stress:     Feeling of Stress :    Social Connections:     Frequency of Communication with Friends and Family:     Frequency of Social Gatherings with Friends and Family:     Attends [employer] Services:     Active Member of Clubs or Organizations:     Attends Club or Organization Meetings:     Marital Status:    Intimate Partner Violence:     Fear of Current or Ex-Partner:     Emotionally Abused:     Physically Abused:     Sexually Abused:      Current Facility-Administered Medications   Medication Dose Route Frequency Provider Last Rate Last Admin    0.9 % sodium chloride infusion  1,000 mL Intravenous Continuous Barbara Baumann  mL/hr at 21 1328 1,000 mL at 05/27/21 1328     Current Outpatient Medications   Medication Sig Dispense Refill    levalbuterol (XOPENEX) 1.25 MG/3ML nebulizer solution Take 3 mLs by nebulization every 4 hours as needed for Wheezing or Shortness of Breath Failed albuterol 360 mL 5    isosorbide mononitrate (IMDUR) 30 MG extended release tablet Take 30 mg by mouth daily      Multiple Vitamins-Minerals (SENIOR MULTIVITAMIN PLUS PO) Take 0.5 tablets by mouth 2 times daily      vitamin B-6 (PYRIDOXINE) 50 MG tablet Take 50 mg by mouth nightly      vitamin B-12 (CYANOCOBALAMIN) 100 MCG tablet Take 100 mcg by mouth nightly      aspirin 81 MG EC tablet Take 81 mg by mouth nightly      atorvastatin (LIPITOR) 40 MG tablet Take 1 tablet by mouth nightly 30 tablet 3    metoprolol succinate (TOPROL XL) 25 MG extended release tablet Take 0.5 tablets by mouth daily 30 tablet 3    pantoprazole (PROTONIX) 40 MG tablet Take 1 tablet by mouth every morning (before breakfast) 30 tablet 3    ipratropium-albuterol (DUONEB) 0.5-2.5 (3) MG/3ML SOLN nebulizer solution Inhale 1 vial into the lungs every 4 hours      levothyroxine (SYNTHROID) 88 MCG tablet Take 88 mcg by mouth Daily      coenzyme Q-10 100 MG capsule Take 100 mg by mouth daily      Flaxseed, Linseed, (FLAXSEED OIL) 1000 MG CAPS Take by mouth      fluticasone (FLONASE) 50 MCG/ACT nasal spray 1 spray by Each Nostril route daily      folic acid (FOLVITE) 1 MG tablet Take 1 mg by mouth daily      dextromethorphan-guaiFENesin (MUCINEX DM)  MG per extended release tablet Take 1 tablet by mouth every 12 hours as needed 1/2 tab daily      Ascorbic Acid (VITAMIN C) 250 MG tablet Take 500 mg by mouth daily       Allergies   Allergen Reactions    Albuterol        REVIEW OF SYSTEMS  10 systems reviewed, pertinent positives per HPI otherwise noted to be negative.     PHYSICAL EXAM  BP (!) 130/56   Pulse 63   Temp 97.7 °F (36.5 °C) (Axillary)   Resp 18   SpO2 97%   GENERAL APPEARANCE: Limited secondary to unresponsive state. Patient is minimally responsive, responds with moans and grimaces to painful stimuli. HEAD: Normocephalic. Atraumatic. EYES: Patient's eyes are closed, will not open his eyes to pain or voice. ENT: Mucous membranes are dry  NECK: Supple, trachea midline. HEART: RRR. LUNGS: Respirations unlabored. CTAB. ABDOMEN: Soft. Non-distended. Non-tender. No guarding or rebound. Indwelling Mancuso catheter in place. EXTREMITIES: No peripheral edema. Ofelia Parrish SKIN: Warm, dry and intact. No acute rashes. NEUROLOGICAL: Patient responds with grimace to painful stimuli but otherwise does not follow commands. He is almost completely unresponsive. He does have spontaneous respirations. PSYCHIATRIC: Unresponsive    LABS  I have reviewed all labs for this visit.    Results for orders placed or performed during the hospital encounter of 05/27/21   CBC Auto Differential   Result Value Ref Range    WBC 13.3 (H) 4.0 - 11.0 K/uL    RBC 3.37 (L) 4.20 - 5.90 M/uL    Hemoglobin 10.8 (L) 13.5 - 17.5 g/dL    Hematocrit 33.3 (L) 40.5 - 52.5 %    MCV 98.7 80.0 - 100.0 fL    MCH 31.9 26.0 - 34.0 pg    MCHC 32.4 31.0 - 36.0 g/dL    RDW 17.9 (H) 12.4 - 15.4 %    Platelets 657 434 - 328 K/uL    MPV 9.7 5.0 - 10.5 fL    Neutrophils % 85.6 %    Lymphocytes % 6.8 %    Monocytes % 6.6 %    Eosinophils % 0.7 %    Basophils % 0.3 %    Neutrophils Absolute 11.4 (H) 1.7 - 7.7 K/uL    Lymphocytes Absolute 0.9 (L) 1.0 - 5.1 K/uL    Monocytes Absolute 0.9 0.0 - 1.3 K/uL    Eosinophils Absolute 0.1 0.0 - 0.6 K/uL    Basophils Absolute 0.0 0.0 - 0.2 K/uL   Comprehensive metabolic panel   Result Value Ref Range    Sodium 142 136 - 145 mmol/L    Potassium 3.7 3.5 - 5.1 mmol/L    Chloride 104 99 - 110 mmol/L    CO2 25 21 - 32 mmol/L    Anion Gap 13 3 - 16    Glucose 125 (H) 70 - 99 mg/dL    BUN 54 (H) 7 - 20 mg/dL    CREATININE 3.2 (H) 0.8 - 1.3 mg/dL    GFR Non-African American 18 (A) >60    GFR  22 (A) >60

## 2021-05-27 NOTE — PROGRESS NOTES
Admission assessment completed and charted. VSS. Alert but disoriented. Bed alarm on. Four eye completed. Family will be here tomorrow. F/c chronic, per caregiver, pt is due for catheter to be changed tomorrow. Bed locked and in lowest position. Call light within reach. Pt denies any other needs at this time. Will continue to monitor.

## 2021-05-27 NOTE — ED NOTES
Bed: 03  Expected date:   Expected time:   Means of arrival:   Comments:  Medic 36850 Lencho Katz RN  05/27/21 2767

## 2021-05-27 NOTE — CONSULTS
Palliative Care Initial Note  Palliative Care Admit date:  5/27/21  Reason for c/s:  Bygget 64    Advance Directives: The only poa family is able to produce does not appear r/t healthcare but they also cannot provide the completed document for review. Pt did execute a Living Will though family did not provide completed document. In the absence of writer being able to review a Healthcare Power of , the spouse of pt confirmed that dtr, Jaylene Johnson, is pts HCPOA. Pt currently has a full code status. In talking w/ Chelsea Javier, initially, she was adamant that pts status should remain full resuscitation \"until I can get back there. \"  However, in talking w/ pts spouse, she was clear to say that resuscitation was not what she wanted for pt nor did she think pt would want that. Spouse agreed for writer to pull Chelsea Herrera back up on speaker phone and continue this discussion. Spouse was able to verb to amparo that she did not want pt resuscitated and Chelsea Herrera was quick to comply w/ mom's expressed wish. All agree that we will not provide cpr nor will pt be intubated for MV support. Plan of care/goals:  Family in the ED sadly verb their understanding/belief that pt is dying and spouse very much wants to take pt home w/ hospice \"to die. \"  While Mrs. Tyson verbalized her wish to Deshaun Rogers states that her expectation is pt remain here until she gets back in town and she can Pamela him for myself, look in his eyes and pray with him. \"  She added \"I want the doctors to give him fluids and keep him alive (sans resuscitation) so that I can see him when I get back there tomorrow night. \"      When the sister @ BS and wife of pt expressed their desire to keep pt comfortable/pain free, Chelsea Herrera stated Kylee Georges is not to receive morphine. \"    **Writer interjected & informed all that pt has the right to be kept comfortable and that the physician will ultimately make the determination of what will keep pt most comfortable and, if that is morphine, it will be ordered/given; Wendelyn Carry agreement. Writer reassured spouse that team would keep pt comfortable w/ whatever modality was deemed most appropriate. [place] Ybarra [place] and, as long as her rapid covid that she takes 5/28 @ 0700 is negative, she will be able to board the plane and expects to arrive home around \"10 or 11\" Friday night. When spouse verb'd more than once to amparo that she wants to \"take him home and keep him comfortable (w/ hospice),\" Meliton Ybarra replied \"we'll do that as soon as I get back home. \"      Social/Spiritual:  It seems as though Meliton Ybarra and her sister are very much at odds w/ Bygget 64, and have a contentious relationship (per writer's d/w both sister's). Have apprised chaplain Alejandro Garcia of the above and concerns for additional support for spouse in the midst of these challenges. Plan:  Amend code status to reflect DNR/DNI; all family in agreement. HCPOA asking that pt be admitted w/ the goal of \"helping him survive until I can get home. \" D/w ED physician, . Would anticipate that they can have a mtg w/ hospice Saturday to make arrangements for comfort care, provided Meliton Ybarra in agreement. ADDENDUM @ [phone]:  Amparo's  faxed HCPOA paperwork to the ED which confirmed her authority for decision making.   Paperwork in soft chart        Reason for consult:  _X_ Advance Care Planning  ___ Transition of Care Planning  _X_ Psychosocial/Spiritual Support  ___ Symptom Management                                                                                                                                                        Maida Fair RN

## 2021-05-27 NOTE — ED NOTES
Upon arrival to ED room. Family to ED entrance and states that pt is DNR end of life care. POA to room to discuss plan of care with physician. Physician in room to discuss plan of care with wife, daughter, and caregiver.      Evelyn Noland RN  05/27/21 9700

## 2021-05-27 NOTE — H&P
Hospital Medicine History & Physical      PCP: Jessica Casper MD    Date of Admission: 5/27/2021    Date of Service: Pt seen/examined on 5/27/21 and Admitted to Inpatient with expected LOS greater than two midnights due to medical therapy. Chief Complaint:  unresponsive      History Of Present Illness:      80 y.o. 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 Pt was recently discharged on 5/13/21 where he was treated for pna, encephalopathy, hypercalcemia. Pt went back to [address] [place] [address] via ambulette. He was able to walk to the bathroom and took a shower with help from family and through use of a walker. On Wednesday, family had to help him up. He managed to eat ensure and icecream and managed to have some soup last night as well as icecream around 2300. Today, he only ate a few bites of breakfast.  [facility] and was concerned about significant decline in the past 1 week so recommended evaluation at hospital due to concerns for pt actively dying. -pt has not been very coherent and interactive, except with wife. -he has been essentially living out of his recliner    Past Medical History:          Diagnosis Date    Acute CHF (congestive heart failure) ([facility] 5/5/2021    Arrhythmia     CAD (coronary artery disease)     CKD (chronic kidney disease) stage 4, GFR 15-29 ml/min ([facility] 5/5/2021    Pneumonia 02/2021       Past Surgical History:          Procedure Laterality Date    UPPER GASTROINTESTINAL ENDOSCOPY N/A 2/25/2021    EGD DIAGNOSTIC ONLY performed by Sylvia Garcia MD at [address] [place] [address]       Medications Prior to Admission:      Prior to Admission medications    Medication Sig Start Date End Date Taking?  Authorizing Provider   levalbuterol (XOPENEX) 1.25 MG/3ML nebulizer solution Take 3 mLs by nebulization every 4 hours as needed for Wheezing or Shortness of Breath Failed albuterol 5/17/21   [id number] YAEL Cerrato MD isosorbide mononitrate (IMDUR) 30 MG extended release tablet Take 30 mg by mouth daily 3/22/21   Historical Provider, MD   Multiple Vitamins-Minerals (SENIOR MULTIVITAMIN PLUS PO) Take 0.5 tablets by mouth 2 times daily    Historical Provider, MD   vitamin B-6 (PYRIDOXINE) 50 MG tablet Take 50 mg by mouth nightly    Historical Provider, MD   vitamin B-12 (CYANOCOBALAMIN) 100 MCG tablet Take 100 mcg by mouth nightly    Historical Provider, MD   aspirin 81 MG EC tablet Take 81 mg by mouth nightly    Historical Provider, MD   atorvastatin (LIPITOR) 40 MG tablet Take 1 tablet by mouth nightly 3/1/21   ALDO Lamb CNP   metoprolol succinate (TOPROL XL) 25 MG extended release tablet Take 0.5 tablets by mouth daily 3/1/21   ALDO Lamb CNP   pantoprazole (PROTONIX) 40 MG tablet Take 1 tablet by mouth every morning (before breakfast) 3/2/21   ALDO Lamb CNP   ipratropium-albuterol (DUONEB) 0.5-2.5 (3) MG/3ML SOLN nebulizer solution Inhale 1 vial into the lungs every 4 hours    Historical Provider, MD   levothyroxine (SYNTHROID) 88 MCG tablet Take 88 mcg by mouth Daily    Historical Provider, MD   coenzyme Q-10 100 MG capsule Take 100 mg by mouth daily    Historical Provider, MD   Flaxseed, Linseed, (FLAXSEED OIL) 1000 MG CAPS Take by mouth    Historical Provider, MD   fluticasone (FLONASE) 50 MCG/ACT nasal spray 1 spray by Each Nostril route daily    Historical Provider, MD   folic acid (FOLVITE) 1 MG tablet Take 1 mg by mouth daily    Historical Provider, MD   dextromethorphan-guaiFENesin (MUCINEX DM)  MG per extended release tablet Take 1 tablet by mouth every 12 hours as needed 1/2 tab daily    Historical Provider, MD   Ascorbic Acid (VITAMIN C) 250 MG tablet Take 500 mg by mouth daily    Historical Provider, MD       Allergies:  Albuterol    Social History:      The patient currently lives at [facility]:   reports that he quit smoking about 46 years ago.  He has never used smokeless tobacco.  ETOH:   reports previous alcohol use. E-Cigarettes/Vaping Use     Questions Responses    E-Cigarette/Vaping Use Never User    Start Date     Passive Exposure     Quit Date     Counseling Given     Comments             Family History:       Reviewed in detail and negative for DM, CAD, Cancer, CVA. Positive as follows:    No family history on file. REVIEW OF SYSTEMS COMPLETED:   Pertinent positives as noted in the HPI. All other systems reviewed and negative. PHYSICAL EXAM PERFORMED:    /61   Pulse 62   Temp 97.6 °F (36.4 °C) (Axillary)   Resp 18   SpO2 98%     General appearance:  No apparent distress, appears stated age and cooperative. Thin/frail appearing  HEENT:  Normal cephalic, atraumatic without obvious deformity. Pupils equal, round, and reactive to light. Extra ocular muscles intact. Conjunctivae/corneas clear. Neck: Supple, with full range of motion. No jugular venous distention. Trachea midline. Respiratory:  Normal respiratory effort. Clear to auscultation, bilaterally without Rales/Wheezes/Rhonchi. Cardiovascular:  Regular rate and rhythm with normal S1/S2 without murmurs, rubs or gallops. Abdomen: Soft, non-tender, non-distended with normal bowel sounds. Musculoskeletal:  No clubbing, cyanosis or edema bilaterally. Full range of motion without deformity. Skin: Skin color, texture, turgor normal.  No rashes or lesions. Neurologic:  Neurovascularly intact without any focal sensory/motor deficits.  Cranial nerves: II-XII intact, grossly non-focal.  Psychiatric:  Alert and oriented x 1, thought content not appropriate, not normal insight  Capillary Refill: Brisk,3 seconds, normal  Peripheral Pulses: +2 palpable, equal bilaterally       Labs:     Recent Labs     05/27/21  1241   WBC 13.3*   HGB 10.8*   HCT 33.3*        Recent Labs     05/27/21  1241      K 3.7      CO2 25   BUN 54*   CREATININE 3.2*   CALCIUM 14.4*     Recent Labs 05/27/21  1241   AST 31   ALT 16   BILITOT 0.4   ALKPHOS 86     No results for input(s): INR in the last 72 hours. No results for input(s): Joel Journey in the last 72 hours. Urinalysis:      Lab Results   Component Value Date    NITRU Negative 05/27/2021    WBCUA >100 05/27/2021    BACTERIA Rare 05/27/2021    RBCUA 11-20 05/27/2021    BLOODU MODERATE 05/27/2021    SPECGRAV 1.025 05/27/2021    GLUCOSEU Negative 05/27/2021       Radiology:     CXR: I have reviewed the CXR with the following interpretation: na  EKG:  I have reviewed the EKG with the following interpretation: na    No orders to display       ASSESSMENT:    Active Hospital Problems    Diagnosis Date Noted   Cholo Mckeon [R41.89] 05/27/2021         PLAN:    Failure to thrive- with increasing somnolence, dec'd appetite. VSS. -palliative care consulted in ER, see extensive documentation after discussion with family and HCPOA(Kath). Pt is a LIMITED code.  -Likely will need hospice. -supportive care at this time  -will give morphine prn iv only if pt distressed    Hypercalcemia- ongoing, likely from renal dz, was previously given calcitonin x 2 doses on prior admit  -gentle ivfs started here    Abn UA-likely from chronic indwelling jose  -monitor for f/chills  -given dose of rocephin iv on 5/27    CAD- no cp/sob  -Held asa/statin/bb/imdur  HTN- held all bp meds  GERD- held ppi  Hypothyroid- continued synthroid        DVT Prophylaxis: lovenox  Diet: DIET GENERAL;  Code Status: Limited    PT/OT Eval Status: not ordered    Dispo - pending POA arrival, will likely consult hospice tomorrow       Ck Romero MD    Thank you Samm Chapman MD for the opportunity to be involved in this patient's care. If you have any questions or concerns please feel free to contact me at [phone].

## 2021-05-27 NOTE — ED NOTES
Family considering home hospice, patient unresponsive.   Called case management @[phone]  RN-Terrie Lopez in ED bedside @[phone]     Gaylen Ben Naegele  05/27/21 3520

## 2021-05-27 NOTE — ED NOTES
Pt provided with ice chips, water, and blankets. Family provided with water. Family informed of expectations of lab result time as well as approximate expectations of palliative care to come speak with family.      Sachin Whyte RN  05/27/21 1047

## 2021-05-27 NOTE — FLOWSHEET NOTE
notified of patient admission and family communication. Will follow for patient and family support. Family declined  while in ED. Alf Masters  [phone]       05/27/21 1611   Encounter Summary   Services provided to: Family; Patient   Referral/Consult From: Palliative Care  (phone w/Terrie)   Support System Spouse; Children  (Daughter Chelsea Herrera is POA)   Continue Visiting   (5/27: pt family declines family visit, will follow for suppo)   Complexity of Encounter Moderate   Length of Encounter 30 minutes   Routine   Type Initial   Assessment Unable to respond   Intervention Nurtured hope; Active listening

## 2021-05-27 NOTE — PROGRESS NOTES
4 Eyes Skin Assessment     The patient is being assess for  Admission    I agree that 2 RN's have performed a thorough Head to Toe Skin Assessment on the patient. ALL assessment sites listed below have been assessed. Areas assessed by both nurses: Liu Noel RN   [x]   Head, Face, and Ears   [x]   Shoulders, Back, and Chest  [x]   Arms, Elbows, and Hands   [x]   Coccyx, Sacrum, and Ischum  [x]   Legs, Feet, and Heels        Does the Patient have Skin Breakdown?   No         Lei Prevention initiated:  Yes   Wound Care Orders initiated:  No      [facility] nurse consulted for Pressure Injury (Stage 3,4, Unstageable, DTI, NWPT, and Complex wounds):  No      Nurse 1 eSignature: Electronically signed by Cricket Braden RN on 5/27/21 at 7:30 PM EDT    **SHARE this note so that the co-signing nurse is able to place an eSignature**    Nurse 2 eSignature: Electronically signed by Urbano Minor RN on 5/28/21 at 5:15 PM EDT

## 2021-05-28 NOTE — PROGRESS NOTES
Palliative Care Progress Note  Palliative Care Admit date:  5/27/21    CLARIFICATION:  Danielle Jimenez is not anticipated to return to area until this evening, b/t 2200 & 2300, so a family mtg to discuss [address] is unlikely before 5/29. Have apprised shift RN.           Reason for consult:    ___ Advance Care Planning  ___ Transition of Care Planning  ___ Psychosocial/Spiritual Support  ___ Symptom Management                                                                                                                                                                        Lazaro Hernandez RN

## 2021-05-28 NOTE — CARE COORDINATION
CASE MANAGEMENT INITIAL ASSESSMENT      Reviewed chart and completed assessment with:with wife and daughter. Explained Case Management role/services. Primary contact information:[address] Arlene Lozoya :   Primary Decision Maker: Casey Hutchinson Child - [phone]    Secondary Decision Maker: alexsandra june - Spouse - [phone]          Can this person be reached and be able to respond quickly, such as within a few minutes or hours? Yes      Admit date/status:5/27/21  Diagnosis:unresponsive   Is this a Readmission?:  Yes      Insurance:[facility]   Precert required for SNF: Yes       3 night stay required: No    Living arrangements, Adls, care needs, prior to admission:lives at [facility] with wife    Francois Beaulieu at home:  Walker__Cane__RTS__ BSC__Shower Chair__  02_x aero care_ HHN__ CPAP__  [facility] Bed__ W/C___ Other__________    Services in the home and/or outpatient, prior to admission:active with [facility] and [facility]. Dialysis Facility (if applicable)   · Name:  · Address:  · Dialysis Schedule:  · Phone:  · Fax:    PT/OT recs:none    Hospital Exemption Notification (HEN):needed for SNF    Barriers to discharge:none    Plan/comments:spoke with wife and daughter. Waiting on daughter/POA [place] to return to country tomorrow for direction of care. Patient appears uncomfortable. Family requesting comfort  medication. Kyaw Cowart RN updated.  CM will continue to follow and assist. Grant Shea RN      ECOC on chart for MD signature

## 2021-05-28 NOTE — SIGNIFICANT EVENT
Noted hypercalcemia thus fluid bolus with calcitonin and labs ordered. One time zoledronic acid given calcium level >14. CT chest ordered and noted no edema but a RLL pneumonia    Broadened abx to doxy and ceftriaxone given qtc prolonged.   MRSA, strep ag, resp cx, and leg pending

## 2021-05-28 NOTE — PROGRESS NOTES
Paged Aurora Essex, MD, \"moved pt to 339 for avasys fyi, also calcium ion 1.59\" @0919. No new orders at this time.

## 2021-05-28 NOTE — PROGRESS NOTES
Paged Cathleen Fields MD, \"pt family is requesting his home doses of breathing treatments. can we get those ordered please? \" @1346.

## 2021-05-28 NOTE — PROGRESS NOTES
Pt A&O to self only. VSS. Crackles in lower lung lobes bilat. Patient will try to roll when requested but is very weak. Assessment is as charted. Call light and bedside table within reach, wheels locked, bed in lowest position, bed alarm on, Pt instructed to call out for assistance.     Electronically signed by Wing Barbara RN on 5/27/2021 at 11:15 PM

## 2021-05-28 NOTE — PROGRESS NOTES
Hospitalist Progress Note      PCP: Samm Chapman MD    Date of Admission: 5/27/2021    Chief Complaint: unresponsive    Hospital Course: 80 y.o. 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 Palliative care consulted. Subjective: slightly more alert today, no family currently present       Medications:  Reviewed    Infusion Medications    sodium chloride 25 mL (05/28/21 0822)     Scheduled Medications    doxycycline (VIBRAMYCIN) IV  100 mg Intravenous Q12H    cefTRIAXone (ROCEPHIN) IV  1,000 mg Intravenous Q24H    levothyroxine  88 mcg Oral Daily    sodium chloride flush  5-40 mL Intravenous 2 times per day    enoxaparin  30 mg Subcutaneous Daily     PRN Meds: sodium chloride flush, sodium chloride, promethazine **OR** ondansetron, polyethylene glycol, acetaminophen **OR** acetaminophen      Intake/Output Summary (Last 24 hours) at 5/28/2021 1039  Last data filed at 5/28/2021 0344  Gross per 24 hour   Intake 100 ml   Output 550 ml   Net -450 ml       Physical Exam Performed:    BP (!) 166/83   Pulse 81   Temp 97.5 °F (36.4 °C) (Axillary)   Resp 16   SpO2 98%     General appearance:  No apparent distress, appears stated age and cooperative. Thin/frail/cachetic appearing  HEENT:  Normal cephalic, atraumatic without obvious deformity. Pupils equal, round, and reactive to light. Extra ocular muscles intact. Conjunctivae/corneas clear. Neck: Supple, with full range of motion. No jugular venous distention. Trachea midline. Respiratory:  Normal respiratory effort. Clear to auscultation, bilaterally without Rales/Wheezes/Rhonchi. Cardiovascular:  Regular rate and rhythm with normal S1/S2 without murmurs, rubs or gallops. Abdomen: Soft, non-tender, non-distended with normal bowel sounds. Musculoskeletal:  No clubbing, cyanosis or edema bilaterally. Full range of motion without deformity.   Skin: Skin color, texture, turgor normal.  No rashes or lesions. Neurologic:  Neurovascularly intact without any focal sensory/motor deficits. Cranial nerves: II-XII intact, grossly non-focal.  Psychiatric:  Alert and oriented x 1, thought content not appropriate, not normal insight  Capillary Refill: Brisk,3 seconds, normal  Peripheral Pulses: +2 palpable, equal bilaterally        Labs:   Recent Labs     05/27/21  1241   WBC 13.3*   HGB 10.8*   HCT 33.3*        Recent Labs     05/27/21  1241      K 3.7      CO2 25   BUN 54*   CREATININE 3.2*   CALCIUM 14.4*     Recent Labs     05/27/21  1241   AST 31   ALT 16   BILITOT 0.4   ALKPHOS 86     No results for input(s): INR in the last 72 hours. Recent Labs     05/27/21  1241   TROPONINI 0.12*       Urinalysis:      Lab Results   Component Value Date    NITRU Negative 05/27/2021    WBCUA >100 05/27/2021    BACTERIA Rare 05/27/2021    RBCUA 11-20 05/27/2021    BLOODU MODERATE 05/27/2021    SPECGRAV 1.025 05/27/2021    GLUCOSEU Negative 05/27/2021       Radiology:  US HEAD NECK SOFT TISSUE THYROID   Final Result   Small thyroid with single nodule. No parathyroid adenoma identified. RECOMMENDATIONS:   NODULE 1:  ACR TI-RADS TR4:      Recommend:  No follow-up. ACR TI-RADS recommendations:      TR5 (>= 7 points):  FNA if >= 1 cm; follow-up if 0.5-0.9 cm in 1, 2, 3, 4,   and 5 years      TR4 (4-6 points):  FNA if >= 1.5 cm; follow-up if 1.0-1.4 cm in 1, 2, 3, and   5 years      TR3 (3 points):  FNA if >= 2.5 cm; follow-up if 1.5-2.4 cm in 1, 3, and 5   years      TR2 (2 points):  No FNA or follow-up      TR1 (0 points):  No FNA or follow-up      ACR TI-RADS recommends that no more than two nodules with the highest ACR   TI-RADS point total should be biopsied and no more than four nodules should   be followed. CT CHEST WO CONTRAST   Final Result   Right lower lobe pneumonia.                  Assessment/Plan:    Active Hospital Problems

## 2021-05-28 NOTE — CONSULTS
Evaluation:   Behavioral-Environmental Outcomes:  None Identified   Food/Nutrient Intake Outcomes:  Food and Nutrient Intake, Supplement Intake  Physical Signs/Symptoms Outcomes:  Biochemical Data, Nutrition Focused Physical Findings, Weight     Discharge Planning:     Too soon to determine     Electronically signed by Nelson Ruiz MS, RD, LD on 5/28/21 at 4:06 PM EDT    Contact: [phone]

## 2021-05-28 NOTE — CONSULTS
Nephrology Consult Note                                                                                                                                                                                                                                                                                                                                                               Office : [phone]     Fax :[phone]              Patient's Name: Mando Hawkins  3:21 PM  5/28/2021    Reason for Consult:  hypercalcemia  Requesting Physician:  Hilary Reynoso MD      Chief Complaint:  AMS    History of Present Ilness:    Mando Hawkins is a 80 y.o. male with CAD, CKD , hypercalcemia , HTN,CHF    Was brought to hospital with c/o    Unresponsiveness    Weakness +    Poor po intake +    No sob    No fever    Wife at bed side      Last admission : was treated for CHF , PNA , ANGEL and hypercalcemia        Past Medical History:   Diagnosis Date    Acute CHF (congestive heart failure) ([facility] [address] 05/05/2021    Arrhythmia     CAD (coronary artery disease)     CKD (chronic kidney disease) stage 4, GFR 15-29 ml/min ([facility] [address] 05/05/2021    MRSA (methicillin resistant staph aureus) culture positive 05/27/2021    colonization    Pneumonia 02/2021       Past Surgical History:   Procedure Laterality Date    UPPER GASTROINTESTINAL ENDOSCOPY N/A 2/25/2021    EGD DIAGNOSTIC ONLY performed by Berna Corona MD at [address] [place] [address]       No family history on file. reports that he quit smoking about 46 years ago. He has never used smokeless tobacco. He reports previous alcohol use.     Allergies:  Albuterol    Current Medications:    doxycycline (VIBRAMYCIN) 100 mg in dextrose 5 % 100 mL IVPB, Q12H  cefTRIAXone (ROCEPHIN) 1000 mg IVPB in 50 mL D5W minibag, Q24H  0.9 % sodium chloride infusion, Continuous  calcitonin (MIACALCIN) injection 200 Units, BID  levothyroxine (SYNTHROID) tablet 88 mcg, Daily  sodium chloride flush 0.9 % injection 5-40 mL, 2 times per day  sodium chloride flush 0.9 % injection 5-40 mL, PRN  0.9 % sodium chloride infusion, PRN  enoxaparin (LOVENOX) injection 30 mg, Daily  promethazine (PHENERGAN) tablet 12.5 mg, Q6H PRN   Or  ondansetron (ZOFRAN) injection 4 mg, Q6H PRN  polyethylene glycol (GLYCOLAX) packet 17 g, Daily PRN  acetaminophen (TYLENOL) tablet 650 mg, Q6H PRN   Or  acetaminophen (TYLENOL) suppository 650 mg, Q6H PRN        Review of Systems:   14 point ROS obtained but were negative except mentioned in HPI      Physical exam:     Vitals:  BP (!) 148/74   Pulse 85   Temp 97.9 °F (36.6 °C) (Axillary)   Resp 16   SpO2 96%   Constitutional:  OAA X 1  Skin: no rash, turgor wnl  Heent:  eomi, mmm  Neck: no bruits or jvd noted  Cardiovascular:  S1, S2 without m/r/g  Respiratory: CTA B without w/r/r  Abdomen:  +bs, soft, nt, nd  Ext: no  lower extremity edema  Psychiatric: mood and affect appropriate  Musculoskeletal:  Rom, muscular strength intact    Data:   Labs:  CBC:   Recent Labs     05/27/21  1241   WBC 13.3*   HGB 10.8*        BMP:    Recent Labs     05/27/21  1241      K 3.7      CO2 25   BUN 54*   CREATININE 3.2*   GLUCOSE 125*     Ca/Mg/Phos:   Recent Labs     05/27/21  1241   CALCIUM 14.4*     Hepatic:   Recent Labs     05/27/21  1241   AST 31   ALT 16   BILITOT 0.4   ALKPHOS 86     Troponin:   Recent Labs     05/27/21  1241   TROPONINI 0.12*     BNP: No results for input(s): BNP in the last 72 hours. Lipids: No results for input(s): CHOL, TRIG, HDL, LDLCALC, LABVLDL in the last 72 hours. ABGs: No results for input(s): PHART, PO2ART, IXZ9UMC in the last 72 hours. INR: No results for input(s): INR in the last 72 hours.   UA:  Recent Labs     05/27/21  1213   COLORU Yellow   CLARITYU CLOUDY*   GLUCOSEU Negative   BILIRUBINUR Negative   KETUA Negative   SPECGRAV 1.025   BLOODU MODERATE*   PHUR 5.5   PROTEINU 100*   UROBILINOGEN 0.2   NITRU Negative   LEUKOCYTESUR LARGE*   LABMICR YES Kaylen Coughlin NotGiven      Urine Microscopic:   Recent Labs     05/27/21  1213   BACTERIA Rare*   WBCUA >100*   RBCUA 11-20*   EPIU 2-5     Urine Culture: No results for input(s): LABURIN in the last 72 hours. Urine Chemistry: No results for input(s): Darline Shivers, PROTEINUR, NAUR in the last 72 hours. IMAGING:  US HEAD NECK SOFT TISSUE THYROID   Final Result   Small thyroid with single nodule. No parathyroid adenoma identified. RECOMMENDATIONS:   NODULE 1:  ACR TI-RADS TR4:      Recommend:  No follow-up. ACR TI-RADS recommendations:      TR5 (>= 7 points):  FNA if >= 1 cm; follow-up if 0.5-0.9 cm in 1, 2, 3, 4,   and 5 years      TR4 (4-6 points):  FNA if >= 1.5 cm; follow-up if 1.0-1.4 cm in 1, 2, 3, and   5 years      TR3 (3 points):  FNA if >= 2.5 cm; follow-up if 1.5-2.4 cm in 1, 3, and 5   years      TR2 (2 points):  No FNA or follow-up      TR1 (0 points):  No FNA or follow-up      ACR TI-RADS recommends that no more than two nodules with the highest ACR   TI-RADS point total should be biopsied and no more than four nodules should   be followed. CT CHEST WO CONTRAST   Final Result   Right lower lobe pneumonia. Assessment/Plan     1. Hypercalcemia           2. CKD stage 4    3. Acute metabolic encephalopathy    4.  CAD      Plan    Give IVF NS    Give calcitonin bid x 4 doses total    Check PTHrP     ( family thinking for [place]ice/palliative care and may not want to get more work up done at this point for hypercalcemia     But POA is his daughter and she is out of country   Was told she [place] coming to [place] tonight       Avoid contrast                      Thank you for allowing us to participate in care of Alex Aguiar MD  Feel free to contact me   [facility] [address]  Office : [phone]  Fax :[phone]

## 2021-05-28 NOTE — PROGRESS NOTES
Perfect serve message sent to Dr. Shannan Sahu:     \"Patient is here for unresponsiveness. FYI He is scoring greater than 5 on the sepsis score. The patients family will discuss hospice vs palliative tomorrow AM. Do you want to order cultures? will look for any new orders. \"    New Orders: 100ml bolus 200ml/hr. Myacalcin 100 units IM. zoledronic acid IVPB.  CT chest.    Electronically signed by Viral Gusman RN on 5/27/2021 at 8:54 PM

## 2021-05-28 NOTE — PROGRESS NOTES
Paged Jose Joy MD, Manuel Valentin family is at bedside and wants morphine. Per family thinks he is uncomfortable. \" @ [phone]. New orders placed, see MAR, and administered per MD order.

## 2021-05-28 NOTE — PROGRESS NOTES
Pt moved to room 339 for yenifer chow. Pt more awake, stripping clothes off and pulled out IV and tugging on f/c. A&Ox1. More awake this AM. Bed alarm on. Avasys on. Bed locked and in lowest position. Call light within reach. Pt denies any other needs at this time. Will continue to monitor.

## 2021-05-29 NOTE — PROGRESS NOTES
Pt becoming more restless; pulling on lines etc.  Received order for and administered lorazepam 0.25 mg IVP.

## 2021-05-29 NOTE — PROGRESS NOTES
and no clinical s/s of aspiration  IDDSI 4 (puree): suspected premature bolus loss, good oral clearance, vitals stable, no clinical s/s of aspiration and dry vocal quality  IDDSI 5 (minced and moist): suspected premature bolus loss, no anterior bolus loss, oral stasis noted post swallow, vitals stable, no clinical s/s of aspiration, wet vocal quality, impaired mastication, prolonged oral phase, poor oral clearance, impaired A-P bolus transit and throat clearing after the swallow  IDDSI 6 (soft and bite sized): pt declined  IDDSI 7 (regular): DNT  3 oz water: FAUSTO    Clinical signs of oropharyngeal dysphagia likely acute-on-chronic related to hx of dysphagia, reduced physical mobility, respiratory illness, encephalopathy, impaired cognition, generalized weakness, Progressive nature of disease/condition and the aging swallow. Swallow prognosis is poor. Instrumental swallow study is not indicated. Given gross tolerance to restricted diet at bedside and with consideration of age and prognosis coupled with family's wishes for PO intake despite safety, modified PO diet of puree solids and mod thick (honey) liquids is recommended. Peg tubes/alternate means of nutrition have not been shown to reduce the risk of developing aspiration PNA and may increase the risk for development of additional medical complications including site infection, bleeding, perforation, increased use physical/chemical restraints, development of pressure ulcers, and agitation re: [address] [facility]. Should the patient choose an oral diet, the safest and least restrictive diet would be puree solids and mod thick (honey) liquids is recommended. Instrumentation: Not warranted. Recent MBSS. Pt not alert/appropriate for instrumentation. Likely going palliative. Diet recommendation: IDDSI 4 Puree Solids; IDDSI 0 Thin Liquids;  Meds crushed in puree as able  Risk management: upright for all intake, stay upright for at least 30 mins after intake, small bites/sips, total feed, hand-over-hand administration to enhance sensory response, 1:1 supervision with intake, oral care 2-3x/day to reduce adverse affects in the event of aspiration, increase physical mobility as able, alternate bites/sips, slow rate of intake, general GERD precautions, general aspiration precautions and hold PO and contact SLP if s/s of aspiration or worsening respiratory status develop. Impression  Dysphagia Diagnosis: Moderate pharyngeal stage dysphagia;Mild to moderate oral stage dysphagia  Dysphagia Impression : Suspect mod-severe oropharyngeal dysphagia  Dysphagia Outcome Severity Scale: Level 2: Moderate Severe dysphagia- Maximum assistance or maximum use of strategies with partial PO only     Treatment Plan  Requires SLP Intervention: Yes  Duration/Frequency of Treatment: 3-5x/wk  D/C Recommendations: To be determined  Referral To:  (Palliative)    Recommended Diet and Intervention  Diet Solids Recommendation: Dysphagia Pureed (Dysphagia I)  Liquid Consistency Recommendation: Moderately Thick (Honey)  Recommended Form of Meds: Crushed in puree as able  Recommendations: Dysphagia treatment  Therapeutic Interventions: Diet tolerance monitoring;Oral care; Patient/Family education    Compensatory Swallowing Strategies  Compensatory Swallowing Strategies: Alternate solids and liquids;Eat/Feed slowly;Upright as possible for all oral intake;Remain upright for 30-45 minutes after meals;Small bites/sips; Check for pocketing of food on the Left; Check for pocketing of food on the Right;Swallow 2 times per bite/sip; Total feed    Treatment/Goals  Short-term Goals  Timeframe for Short-term Goals: 5 days (06/03/2021)  Goal 1: The patient will tolerate recommended diet with no clinical s/s of aspiration 5/5  Goal 2: The patient/caregiver will demonstrate understanding of compensatory swallow strategies, for improved swallow safety  Goal 3: The patient will tolerate therapeutic diet upgrade trials with no clinical s/s of aspiration 5/5    Long-term Goals  Timeframe for Long-term Goals: 7 days (06/05/2021)  Goal 1: The patient will tolerate least restrictive diet with no clinical s/s of aspiration or worsening respiratory/pulmonary status    General  Chart Reviewed: Yes  Comments: Chart reviewed prior to completion of assessment  Subjective  Subjective: Pt seen in room at bedside with RN permission. Family present  Behavior/Cognition: Alert; Cooperative;Confused; Requires cueing  Respiratory Status: Room air  O2 Device: None (Room air)  Communication Observation: Functional  Follows Directions: Simple  Dentition: Adequate  Patient Positioning: Upright in bed  Baseline Vocal Quality: Normal;Weak  Volitional Cough: Weak  Volitional Swallow: Delayed  Prior Dysphagia History: Pt seen upon prior admission. MBSS completed with rec's for soft and bite sized solids and moderately thick (honey) liquids  Consistencies Administered: Dysphagia Minced and Moist (Dysphagia II); Dysphagia Pureed (Dysphagia I); Honey - straw;Honey - teaspoon       Vision/Hearing  Hearing  Hearing: Within functional limits    Oral Motor Deficits  Oral/Motor  Oral Motor: Within functional limits    Oral Phase Dysfunction  Oral Phase  Oral Phase: Exceptions     Indicators of Pharyngeal Phase Dysfunction   Pharyngeal Phase  Pharyngeal Phase: Exceptions    Prognosis  Prognosis  Prognosis for safe diet advancement: poor  Barriers to reach goals: age;time post onset;severity of dysphagia;other (comment)  Barriers/Prognosis Comment: Pt in advanced stages of condition. Palliative care has been consulted  Individuals consulted  Consulted and agree with results and recommendations: Patient; Family member;RN  Family member consulted: Daughter    Education  Patient Education: SLP educated the patient re: Role of SLP, rationale for completion of assessment, anatomical components of swallow structures as they pertain to airway protection, results of assessment, recommendations and POC    Patient Education Response: Verbalizes understanding;Needs reinforcement  Safety Devices in place: Yes  Type of devices: Left in bed; All fall risk precautions in place; Bed alarm in place;Call light within reach;Nurse notified       Therapy Time  SLP Individual Minutes  Time In: 5846  Time Out: 130 [facility]  Minutes: [address]  5/29/2021 3:27 PM  Maria T Odell M.A., [address] [facility], [place] #[id number]  Speech-Language Pathologist

## 2021-05-29 NOTE — PROGRESS NOTES
Pt. Resting in bed. Drowsy; awakens to voice. Unable to determine orientation status. Vitals and assessment stable as charted. Repositioned for comfort. Mouth care given. Call light in reach. Bed alarm in place. Avasys monitor active. Will continue to monitor.

## 2021-05-29 NOTE — PROGRESS NOTES
Hospitalist Progress Note      PCP: Stacey Lee MD    Date of Admission: 5/27/2021    Chief Complaint: unresponsive     Hospital Course: 80 y. o. 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 Palliative care consulted.     Subjective: remains slightly more alert today, daughter Kath(HCPOA) at bedside         Medications:  Reviewed    Infusion Medications    sodium chloride 100 mL/hr at 05/28/21 1408    sodium chloride 25 mL (05/28/21 0822)     Scheduled Medications    doxycycline (VIBRAMYCIN) IV  100 mg Intravenous Q12H    cefTRIAXone (ROCEPHIN) IV  1,000 mg Intravenous Q24H    levothyroxine  88 mcg Oral Daily    sodium chloride flush  5-40 mL Intravenous 2 times per day    enoxaparin  30 mg Subcutaneous Daily     PRN Meds: HYDROmorphone, sodium chloride flush, sodium chloride, promethazine **OR** ondansetron, polyethylene glycol, acetaminophen **OR** acetaminophen      Intake/Output Summary (Last 24 hours) at 5/29/2021 1042  Last data filed at 5/29/2021 0440  Gross per 24 hour   Intake 509 ml   Output 1125 ml   Net -616 ml       Physical Exam Performed:    BP (!) 169/81   Pulse 87   Temp 98.3 °F (36.8 °C) (Axillary)   Resp 20   Ht 5' 5\" (1.651 m)   SpO2 96%   BMI 18.80 kg/m²      General appearance:  No apparent distress, appears stated age and cooperative. Thin/frail/cachetic appearing  HEENT:  Normal cephalic, atraumatic without obvious deformity. Pupils equal, round, and reactive to light.  Extra ocular muscles intact. Conjunctivae/corneas clear. Neck: Supple, with full range of motion. No jugular venous distention. Trachea midline. Respiratory:  Normal respiratory effort. Clear to auscultation, bilaterally without Rales/Wheezes/Rhonchi. Cardiovascular:  Regular rate and rhythm with normal S1/S2 without murmurs, rubs or gallops.   Abdomen: Soft, non-tender, non-distended with normal bowel sounds. Musculoskeletal:  No clubbing, cyanosis or edema bilaterally.  Full range of motion without deformity. Skin: Skin color, texture, turgor normal.  No rashes or lesions. Neurologic:  Neurovascularly intact without any focal sensory/motor deficits. Cranial nerves: II-XII intact, grossly non-focal.  Psychiatric:  Alert and oriented x 1, thought content not appropriate, not normal insight  Capillary Refill: Brisk,3 seconds, normal  Peripheral Pulses: +2 palpable, equal bilaterally          Labs:   Recent Labs     05/27/21  1241   WBC 13.3*   HGB 10.8*   HCT 33.3*        Recent Labs     05/27/21  1241 05/29/21  1017    148*   K 3.7 4.0    115*   CO2 25 20*   BUN 54* 55*   CREATININE 3.2* 3.0*   CALCIUM 14.4* 11.9*     Recent Labs     05/27/21  1241   AST 31   ALT 16   BILITOT 0.4   ALKPHOS 86     No results for input(s): INR in the last 72 hours. Recent Labs     05/27/21  1241   TROPONINI 0.12*       Urinalysis:      Lab Results   Component Value Date    NITRU Negative 05/27/2021    WBCUA >100 05/27/2021    BACTERIA Rare 05/27/2021    RBCUA 11-20 05/27/2021    BLOODU MODERATE 05/27/2021    SPECGRAV 1.025 05/27/2021    GLUCOSEU Negative 05/27/2021       Radiology:  US HEAD NECK SOFT TISSUE THYROID   Final Result   Small thyroid with single nodule. No parathyroid adenoma identified. RECOMMENDATIONS:   NODULE 1:  ACR TI-RADS TR4:      Recommend:  No follow-up.       ACR TI-RADS recommendations:      TR5 (>= 7 points):  FNA if >= 1 cm; follow-up if 0.5-0.9 cm in 1, 2, 3, 4,   and 5 years      TR4 (4-6 points):  FNA if >= 1.5 cm; follow-up if 1.0-1.4 cm in 1, 2, 3, and   5 years      TR3 (3 points):  FNA if >= 2.5 cm; follow-up if 1.5-2.4 cm in 1, 3, and 5   years      TR2 (2 points):  No FNA or follow-up      TR1 (0 points):  No FNA or follow-up      ACR TI-RADS recommends that no more than two nodules with the highest ACR   TI-RADS point total should be biopsied and no more than four nodules should   be followed. CT CHEST WO CONTRAST   Final Result   Right lower lobe pneumonia. Assessment/Plan:    Active Hospital Problems    Diagnosis     ANGEL (acute kidney injury) ([facility] [address] [N17.9]     Unresponsive [R41.89]      Failure to thrive- with increasing somnolence, dec'd appetite. VSS. -palliative care consulted in ER, see extensive documentation after discussion with family and KATHY(Kath).  Pt is a LIMITED code.   - hospice consulted 5/29 given Kath(hcpoa) is back in town to discuss along with pt' s wife  -supportive care at this time  -will give morphine prn iv only if pt distressed   -doxycycline and rocephin started overnight for possible pna   -SLP eval ordered  -CT chest ordered in 5/28 overnight, concern for pna, doxycyline iv added to regimen along with rocephin    Hypercalcemia- ongoing, likely from renal dz, was previously given calcitonin x 2 doses on prior admit  -gentle ivfs started here  -given dose of calcitonin and  zoledronic acid on 5/27 pm   -nephro consulted again  -thyroid u/s ordered overnight  -Spep ordered and free light chains     Abn UA-likely from chronic indwelling jose  -monitor for f/chills  -given dose of rocephin iv on 5/27, continued here     CAD- no cp/sob  -Held asa/statin/bb/imdur  HTN- held all bp meds  GERD- held ppi  Hypothyroid- continued synthroid       DVT Prophylaxis: lovenox  Diet: DIET GENERAL;  Dietary Nutrition Supplements: Standard High Calorie Oral Supplement, Frozen Oral Supplement  Code Status: Limited    PT/OT Eval Status: not yet ordered    [facility] consulted, pending discussions, continue ivfs and abx    Rosenda Schmitt MD

## 2021-05-29 NOTE — PROGRESS NOTES
[facility]    Met at bedside with pt's POA/dtr Aria Deleon and his wife for an explanation of hospice benefit. At this time they are unsure if they will start the hospice benefit prior to discharge from the hospital or see how things go at home and contact us as needed. Plan is for [address] RN to f/u with family tomorrow and each day until discharge. HOC will facilitate discharge if that is what the family wants to do. Daughter saying they will likely need medication for restlessness upon discharge. Please call with questions or concerns.     Thank you for this referral.  Mina Ponce RN  [phone]

## 2021-05-29 NOTE — PROGRESS NOTES
Pt assessment completed and documented. VSS on RA. Pt A&O x 1. Chronic jose in place draining cloudy yellow urine. Bed in lowest position and wheels locked. Non-skid footwear in place. Bed check is on. Avasys in place for safety to make sure patient doesn't pull at tubes and lines. Call light and bedside table within reach. Pt denies any other needs at this time.

## 2021-05-29 NOTE — PLAN OF CARE
Problem: Nutrition  Intervention: Swallowing evaluation  Note: SLP completed evaluation. Please refer to notes in EMR. Intervention: Aspiration precautions  Note: SLP completed evaluation. Please refer to notes in EMR.     Angelo Schneider M.A., [address] [facility] #[id number]  Speech-Language Pathologist

## 2021-05-29 NOTE — PROGRESS NOTES
Nephrology  Note                                                                                                                                                                                                                                                                                                                                                               Office : [phone]     Fax :[phone]              Patient's Name: Jon Costello  9:26 AM  5/29/2021    Reason for Consult:  hypercalcemia  Requesting Physician:  Dannie Khan MD      Chief Complaint:  AMS    History of Present Ilness:    Jon Costello is a 80 y.o. male with CAD, CKD , hypercalcemia , HTN,CHF    Was brought to hospital with c/oUnresponsiveness  Last admission : was treated for CHF , PNA , ANGEL and hypercalcemia    INTERVAL HISTORY    Pt more or less unresponsive  PO intake poor  Fair UO     Past Medical History:   Diagnosis Date    Acute CHF (congestive heart failure) ([facility] [address] 05/05/2021    Arrhythmia     CAD (coronary artery disease)     CKD (chronic kidney disease) stage 4, GFR 15-29 ml/min ([facility] [address] 05/05/2021    MRSA (methicillin resistant staph aureus) culture positive 05/27/2021    colonization    Pneumonia 02/2021       Past Surgical History:   Procedure Laterality Date    UPPER GASTROINTESTINAL ENDOSCOPY N/A 2/25/2021    EGD DIAGNOSTIC ONLY performed by Leonie Hall MD at [address] [place] [address]       No family history on file. reports that he quit smoking about 46 years ago. He has never used smokeless tobacco. He reports previous alcohol use.     Allergies:  Albuterol    Current Medications:    doxycycline (VIBRAMYCIN) 100 mg in dextrose 5 % 100 mL IVPB, Q12H  cefTRIAXone (ROCEPHIN) 1000 mg IVPB in 50 mL D5W minibag, Q24H  0.9 % sodium chloride infusion, Continuous  calcitonin (MIACALCIN) injection 200 Units, BID  morphine (PF) injection 0.5 mg, Q12H PRN  levothyroxine (SYNTHROID) tablet 88 mcg, Daily  sodium chloride flush 0.9 % injection 5-40 mL, 2 times per day  sodium chloride flush 0.9 % injection 5-40 mL, PRN  0.9 % sodium chloride infusion, PRN  enoxaparin (LOVENOX) injection 30 mg, Daily  promethazine (PHENERGAN) tablet 12.5 mg, Q6H PRN   Or  ondansetron (ZOFRAN) injection 4 mg, Q6H PRN  polyethylene glycol (GLYCOLAX) packet 17 g, Daily PRN  acetaminophen (TYLENOL) tablet 650 mg, Q6H PRN   Or  acetaminophen (TYLENOL) suppository 650 mg, Q6H PRN            Physical exam:     Vitals:  BP (!) 169/81   Pulse 87   Temp 98.3 °F (36.8 °C) (Axillary)   Resp 20   Ht 5' 5\" (1.651 m)   SpO2 96%   BMI 18.80 kg/m²   Constitutional:  resting  Skin: no rash, turgor wnl  Heent: MMD  Neck: no bruits or jvd noted  Cardiovascular:  S1, S2 without m/r/g  Respiratory: Diminished at bases   Abdomen:  +bs, soft, nt, nd  Ext: no  lower extremity edema      Data:   Labs:  CBC:   Recent Labs     05/27/21  1241   WBC 13.3*   HGB 10.8*        BMP:    Recent Labs     05/27/21  1241      K 3.7      CO2 25   BUN 54*   CREATININE 3.2*   GLUCOSE 125*     Ca/Mg/Phos:   Recent Labs     05/27/21  1241   CALCIUM 14.4*     Hepatic:   Recent Labs     05/27/21  1241   AST 31   ALT 16   BILITOT 0.4   ALKPHOS 86     Troponin:   Recent Labs     05/27/21  1241   TROPONINI 0.12*     BNP: No results for input(s): BNP in the last 72 hours. Lipids: No results for input(s): CHOL, TRIG, HDL, LDLCALC, LABVLDL in the last 72 hours. ABGs: No results for input(s): PHART, PO2ART, SJB5AOG in the last 72 hours. INR: No results for input(s): INR in the last 72 hours.   UA:  Recent Labs     05/27/21  1213   COLORU Yellow   CLARITYU CLOUDY*   GLUCOSEU Negative   BILIRUBINUR Negative   KETUA Negative   SPECGRAV 1.025   BLOODU MODERATE*   PHUR 5.5   PROTEINU 100*   UROBILINOGEN 0.2   NITRU Negative   LEUKOCYTESUR LARGE*   LABMICR YES   URINETYPE NotGiven      Urine Microscopic:   Recent Labs     05/27/21  1213   BACTERIA Rare*   WBCUA >100* RBCUA 11-20*   EPIU 2-5     Urine Culture: No results for input(s): LABURIN in the last 72 hours. Urine Chemistry: No results for input(s): Naveen Heady, PROTEINUR, NAUR in the last 72 hours. IMAGING:  US HEAD NECK SOFT TISSUE THYROID   Final Result   Small thyroid with single nodule. No parathyroid adenoma identified. RECOMMENDATIONS:   NODULE 1:  ACR TI-RADS TR4:      Recommend:  No follow-up. ACR TI-RADS recommendations:      TR5 (>= 7 points):  FNA if >= 1 cm; follow-up if 0.5-0.9 cm in 1, 2, 3, 4,   and 5 years      TR4 (4-6 points):  FNA if >= 1.5 cm; follow-up if 1.0-1.4 cm in 1, 2, 3, and   5 years      TR3 (3 points):  FNA if >= 2.5 cm; follow-up if 1.5-2.4 cm in 1, 3, and 5   years      TR2 (2 points):  No FNA or follow-up      TR1 (0 points):  No FNA or follow-up      ACR TI-RADS recommends that no more than two nodules with the highest ACR   TI-RADS point total should be biopsied and no more than four nodules should   be followed. CT CHEST WO CONTRAST   Final Result   Right lower lobe pneumonia. Assessment/Plan     1. Hypercalcemia           2. CKD stage 4    3. Acute metabolic encephalopathy    4.  CAD      Plan    Give IVF NS    Give calcitonin bid x 2 doses total    PTH, 25 D in range  PTHrP pending    [address] discussion       Avoid contrast          Spoke to RN            Thank you for allowing us to participate in care of Kelsey Newman MD  Feel free to contact me   [facility] [address]  Office : [phone]  Fax :[phone]

## 2021-05-30 NOTE — PROGRESS NOTES
Pt becoming more restless; pulling on lines, removing linens, gown, repeatedly attempting to get OOB. Have attempted redirection, repositioning with pt\linens several times without success. Administered lorazepam 0.25 mg IVP as per order. Will watch closely. Avasys monitor remains active and spoke with avasys staff to alarm if pt pulling lines or attempting to get OOB.

## 2021-05-30 NOTE — PROGRESS NOTES
Nephrology  Note                                                                                                                                                                                                                                                                                                                                                               Office : [phone]     Fax :[phone]              Patient's Name: Kanwal Geller  10:41 AM  5/30/2021    Reason for Consult:  hypercalcemia  Requesting Physician:  Danielle Dempsey MD      Chief Complaint:  AMS    History of Present Ilness:    Kanwal Geller is a 80 y.o. male with CAD, CKD , hypercalcemia , HTN,CHF    Was brought to hospital with c/oUnresponsiveness  Last admission : was treated for CHF , PNA , ANGEL and hypercalcemia    INTERVAL HISTORY    Pt about same  PO intake poor  Fair UO     Past Medical History:   Diagnosis Date    Acute CHF (congestive heart failure) ([facility] [address] 05/05/2021    Arrhythmia     CAD (coronary artery disease)     CKD (chronic kidney disease) stage 4, GFR 15-29 ml/min ([facility] [address] 05/05/2021    MRSA (methicillin resistant staph aureus) culture positive 05/27/2021    colonization    Pneumonia 02/2021       Past Surgical History:   Procedure Laterality Date    UPPER GASTROINTESTINAL ENDOSCOPY N/A 2/25/2021    EGD DIAGNOSTIC ONLY performed by Sonu Lambert MD at [address] [place] [address]       No family history on file. reports that he quit smoking about 46 years ago. He has never used smokeless tobacco. He reports previous alcohol use.     Allergies:  Albuterol    Current Medications:    dextrose 5 % and 0.45 % NaCl 1,000 mL infusion, Continuous  HYDROmorphone (DILAUDID) injection 0.5 mg, Q4H PRN  LORazepam (ATIVAN) injection 0.25 mg, Q4H PRN  doxycycline (VIBRAMYCIN) 100 mg in dextrose 5 % 100 mL IVPB, Q12H  cefTRIAXone (ROCEPHIN) 1000 mg IVPB in 50 mL D5W minibag, Q24H  levothyroxine (SYNTHROID) tablet 88 mcg, MODERATE*   PHUR 5.5   PROTEINU 100*   UROBILINOGEN 0.2   NITRU Negative   LEUKOCYTESUR LARGE*   LABMICR YES   [place]az [place] NotGiven      Urine Microscopic:   Recent Labs     05/27/21  1213   BACTERIA Rare*   WBCUA >100*   RBCUA 11-20*   EPIU 2-5     Urine Culture: No results for input(s): LABURIN in the last 72 hours. Urine Chemistry: No results for input(s): Radha Mike, PROTEINUR, NAUR in the last 72 hours. IMAGING:  US HEAD NECK SOFT TISSUE THYROID   Final Result   Small thyroid with single nodule. No parathyroid adenoma identified. RECOMMENDATIONS:   NODULE 1:  ACR TI-RADS TR4:      Recommend:  No follow-up. ACR TI-RADS recommendations:      TR5 (>= 7 points):  FNA if >= 1 cm; follow-up if 0.5-0.9 cm in 1, 2, 3, 4,   and 5 years      TR4 (4-6 points):  FNA if >= 1.5 cm; follow-up if 1.0-1.4 cm in 1, 2, 3, and   5 years      TR3 (3 points):  FNA if >= 2.5 cm; follow-up if 1.5-2.4 cm in 1, 3, and 5   years      TR2 (2 points):  No FNA or follow-up      TR1 (0 points):  No FNA or follow-up      ACR TI-RADS recommends that no more than two nodules with the highest ACR   TI-RADS point total should be biopsied and no more than four nodules should   be followed. CT CHEST WO CONTRAST   Final Result   Right lower lobe pneumonia. Assessment/Plan     1. Hypercalcemia       Ca improving    2. CKD stage 4    3. Acute metabolic encephalopathy    4. CAD    5.  Hypernatremia      Plan    Change to hypotonic fluids    Given calcitonin bid x 2 doses total    PTH, 25 D in range  PTHrP pending    GOC discussion       Avoid nephrotoxin              Thank you for allowing us to participate in care of Óscar Trejo MD  Feel free to contact me   [facility] [address]  Office : [phone]  Fax :[phone]

## 2021-05-30 NOTE — CARE COORDINATION
Writer received call from Harrison Bardales [phone] reports met with family this date, not ready for [address] services at this time will follow up 06/01to ensure still the plan. TATUM Pavon

## 2021-05-30 NOTE — FLOWSHEET NOTE
Attempted to visit with Pt and/or family. Pt asleep, no family present. Left Spiritual Care note, informing them of our availability for support.     [facility] Associate       05/30/21 1017   Encounter Summary   Services provided to: Patient not available   Referral/Consult From: [facility] Family members   Continue Visiting   (5/30 Pt asleep, no family, left note)   Complexity of Encounter Low   Length of Encounter 15 minutes

## 2021-05-30 NOTE — PROGRESS NOTES
Rales/Wheezes/Rhonchi. Cardiovascular:  Regular rate and rhythm with normal S1/S2 without murmurs, rubs or gallops. Abdomen: Soft, non-tender, non-distended with normal bowel sounds. Musculoskeletal:  No clubbing, cyanosis or edema bilaterally.  Full range of motion without deformity. Skin: Skin color, texture, turgor normal.  No rashes or lesions. Neurologic:  Neurovascularly intact without any focal sensory/motor deficits. Cranial nerves: II-XII intact, grossly non-focal.  Psychiatric:  Alert and oriented x 1, thought content not appropriate, not normal insight  Capillary Refill: Brisk,3 seconds, normal  Peripheral Pulses: +2 palpable, equal bilaterally          Labs:   Recent Labs     05/29/21  0628 05/30/21  0550   WBC 7.2 6.1   HGB 9.7* 9.1*   HCT 29.6* 27.5*    193     Recent Labs     05/29/21  1017 05/30/21  0550   * 150*   K 4.0 3.9   * 120*   CO2 20* 20*   BUN 55* 51*   CREATININE 3.0* 3.1*   CALCIUM 11.9* 10.6   PHOS  --  2.0*     No results for input(s): AST, ALT, BILIDIR, BILITOT, ALKPHOS in the last 72 hours. No results for input(s): INR in the last 72 hours. No results for input(s): Shakira Hind in the last 72 hours. Urinalysis:      Lab Results   Component Value Date    NITRU Negative 05/27/2021    WBCUA >100 05/27/2021    BACTERIA Rare 05/27/2021    RBCUA 11-20 05/27/2021    BLOODU MODERATE 05/27/2021    SPECGRAV 1.025 05/27/2021    GLUCOSEU Negative 05/27/2021       Radiology:  US HEAD NECK SOFT TISSUE THYROID   Final Result   Small thyroid with single nodule. No parathyroid adenoma identified. RECOMMENDATIONS:   NODULE 1:  ACR TI-RADS TR4:      Recommend:  No follow-up.       ACR TI-RADS recommendations:      TR5 (>= 7 points):  FNA if >= 1 cm; follow-up if 0.5-0.9 cm in 1, 2, 3, 4,   and 5 years      TR4 (4-6 points):  FNA if >= 1.5 cm; follow-up if 1.0-1.4 cm in 1, 2, 3, and   5 years      TR3 (3 points):  FNA if >= 2.5 cm; follow-up if 1.5-2.4 cm in 1, 3, and 5   years      TR2 (2 points):  No FNA or follow-up      TR1 (0 points):  No FNA or follow-up      ACR TI-RADS recommends that no more than two nodules with the highest ACR   TI-RADS point total should be biopsied and no more than four nodules should   be followed. CT CHEST WO CONTRAST   Final Result   Right lower lobe pneumonia. Assessment/Plan:    Active Hospital Problems    Diagnosis     ANGEL (acute kidney injury) ([facility] [address] [N17.9]     Unresponsive [R41.89]      Failure to thrive- with increasing somnolence, dec'd appetite. VSS. -palliative care consulted in ER, see extensive documentation after discussion with family and KATHY(Kath).  Pt is a LIMITED code. - hospice consulted 5/29 given Kath(hcpoa) is back in town to discuss along with pt' s wife  -supportive care at this time  -will give morphine prn iv only if pt distressed   -doxycycline and rocephin started overnight for possible pna   -SLP eval ordered  -CT chest ordered in 5/28 overnight, concern for pna, doxycyline iv added to regimen along with rocephin     Hypercalcemia- ongoing, likely from renal dz, was previously given calcitonin x 2 doses on prior admit  -gentle ivfs started here  -given dose of calcitonin and  zoledronic acid on 5/27 pm   -nephro consulted again  -thyroid u/s ordered overnight  -Spep ordered and free light chains     Hypernatremia- due to poor po intake  -changed ivfs to d5-1/2 ns    Abn UA-likely from chronic indwelling jose  -monitor for f/chills  -given dose of rocephin iv on 5/27, continued here     CAD- no cp/sob  -Held asa/statin/bb/imdur  HTN- held all bp meds, restarted 5/30  GERD- held ppi  Hypothyroid- continued synthroid       DVT Prophylaxis: lovenox  Diet: Dietary Nutrition Supplements: Standard High Calorie Oral Supplement, Frozen Oral Supplement  DIET DYSPHAGIA PUREED;  Moderately Thick (Honey)  Code Status: Limited    PT/OT Eval Status: not yet ordered    Dispo - change d5-1/2ns, pending nephro recs, hospice discussions with family, restarted bp meds with parameters    Jax Silvestre MD

## 2021-05-30 NOTE — PROGRESS NOTES
Pt assessment completed and documented. VSS on RA. FAUSTO orientation, patient is alert to voice. Chronic jose in place draining cloudy yellow urine with some sediment. Pt medicated for discomfort and anxiety per MAR. Bed in lowest position and wheels locked. Non-skid footwear in place. Bed check is on. Avasys in place for safety to make sure patient doesn't pull at tubes and lines. Call light and bedside table within reach. Pt denies any other needs at this time.

## 2021-05-31 NOTE — DISCHARGE INSTR - COC
Continuity of Care Form    Patient Name: Edward Graf   :  1925  MRN:  [id number]    Admit date:  2021  Discharge date:  2021    Code Status Order: Limited   Advance Directives:     Admitting Physician:  Rosana Guzman MD  PCP: Bryan Kaur MD    Discharging Nurse: Ameya Colin 23 Unit/Room#: 2389/3055-78  Discharging Unit Phone Number: [phone]    Emergency Contact:   Extended Emergency Contact Information  Primary Emergency Contact: wash, cole  Mobile Phone: [phone]  Relation: Spouse  Secondary Emergency Contact: Rain Dale  Home Phone: [phone]  Mobile Phone: [phone]  Relation: Child    Past Surgical History:  Past Surgical History:   Procedure Laterality Date    UPPER GASTROINTESTINAL ENDOSCOPY N/A 2021    EGD DIAGNOSTIC ONLY performed by Maria Fernanda Deras MD at [address] [place] [address]       Immunization History: There is no immunization history on file for this patient.     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    PNA (pneumonia) J18.9    Suspected COVID-19 virus infection Z20.822    Acute CHF (congestive heart failure) ([facility]) I50.9    CKD (chronic kidney disease) stage 4, GFR 15-29 ml/min ([facility]) N18.4    Unresponsive R41.89    ANGEL (acute kidney injury) ([facility] [address] N17.9       Isolation/Infection:   Isolation          Contact        Patient Infection Status     Infection Onset Added Last Indicated Last Indicated By Review Planned Expiration Resolved Resolved By    MRSA 21 MRSA DNA Probe, Nasal        Resolved    COVID-19 Rule Out 21 COVID-19 (Ordered) 05/06/21 Rule-Out Test Resulted    COVID-19 Rule Out 05/05/21 05/05/21 05/05/21 COVID-19, Rapid (Ordered)   05/05/21 Rule-Out Test Resulted          Nurse Assessment:  Last Vital Signs: BP (!) 163/78   Pulse 66   Temp 96.2 °F (35.7 °C) (Axillary)   Resp 18   Ht 5' 5\" (1.651 m)   SpO2 96%   BMI 18.80 kg/m²     Last documented pain score (0-10 scale): Pain Level: 5  Last Weight:   Wt Readings from Last 1 Encounters:   05/12/21 113 lb (51.3 kg)     Mental Status:  Responds to pain    IV Access:  - None    Nursing Mobility/ADLs:  Walking   Dependent  Transfer  Dependent  Bathing  Dependent  Dressing  Dependent  Toileting  Dependent  Feeding  Dependent  Med Admin  Dependent  Med Delivery   unable to swallow currently    Wound Care Documentation and Therapy:        Elimination:  Continence:   · Bowel: No  · Bladder: jose catheter  Urinary Catheter: Last Change Date 5/28/2021 and Indication for Use of Catheter: Hospice/comfort/palliateive care and chronic jose catheter   Colostomy/Ileostomy/Ileal Conduit: No       Date of Last BM: unknown, per family probably 5/23? Intake/Output Summary (Last 24 hours) at 5/31/2021 1540  Last data filed at 5/31/2021 1042  Gross per 24 hour   Intake 60 ml   Output 1350 ml   Net -1290 ml     I/O last 3 completed shifts: In: 61 [P.O.:60]  Out: [address] [Urine:1350]    Safety Concerns: At Risk for Falls and Aspiration Risk    Impairments/Disabilities:      None    Nutrition Therapy:  Current Nutrition Therapy:   - Dysphagia puree    Routes of Feeding: Oral  Liquids: Honey Thick Liquids  Daily Fluid Restriction: no  Last Modified Barium Swallow with Video (Video Swallowing Test): not done    Treatments at the Time of Hospital Discharge:   Respiratory Treatments: none  Oxygen Therapy:  is not on home oxygen therapy.   Ventilator:    - No ventilator support    Rehab Therapies: n/a  Weight Bearing Status/Restrictions: No weight bearing restirctions  Other Medical Equipment (for

## 2021-05-31 NOTE — PROGRESS NOTES
Nephrology  Note                                                                                                                                                                                                                                                                                                                                                               Office : [phone]     Fax :[phone]              Patient's Name: Linnea Kennedy  9:34 AM  5/31/2021    Reason for Consult:  hypercalcemia  Requesting Physician:  Gauri Nagel MD      Chief Complaint:  AMS    History of Present Ilness:    Linnea Kennedy is a 80 y.o. male with CAD, CKD , hypercalcemia , HTN,CHF    Was brought to hospital with c/oUnresponsiveness  Last admission : was treated for CHF , PNA , ANGEL and hypercalcemia    INTERVAL HISTORY    Pt about same  PO intake poor  Fair UO   Na better    Past Medical History:   Diagnosis Date    Acute CHF (congestive heart failure) ([facility] [address] 05/05/2021    Arrhythmia     CAD (coronary artery disease)     CKD (chronic kidney disease) stage 4, GFR 15-29 ml/min ([facility] [address] 05/05/2021    MRSA (methicillin resistant staph aureus) culture positive 05/27/2021    colonization    Pneumonia 02/2021       Past Surgical History:   Procedure Laterality Date    UPPER GASTROINTESTINAL ENDOSCOPY N/A 2/25/2021    EGD DIAGNOSTIC ONLY performed by Maria Esther Barlow MD at [address] [place] [address]       No family history on file. reports that he quit smoking about 46 years ago. He has never used smokeless tobacco. He reports previous alcohol use.     Allergies:  Albuterol    Current Medications:    dextrose 5 % and 0.45 % NaCl 1,000 mL infusion, Continuous  isosorbide mononitrate (IMDUR) extended release tablet 30 mg, Daily  metoprolol succinate (TOPROL XL) extended release tablet 12.5 mg, Daily  saccharomyces boulardii (FLORASTOR) capsule 250 mg, BID  HYDROmorphone (DILAUDID) injection 0.5 mg, Q4H PRN  LORazepam (ATIVAN) results for input(s): INR in the last 72 hours. UA:  No results for input(s): [place]oll, GLUCOSEU, BILIRUBINUR, KETUA, SPECGRAV, BLOODU, PHUR, PROTEINU, UROBILINOGEN, NITRU, LEUKOCYTESUR, Mabelene Na in the last 72 hours. Urine Microscopic:   No results for input(s): LABCAST, BACTERIA, COMU, HYALCAST, WBCUA, RBCUA, EPIU in the last 72 hours. Urine Culture: No results for input(s): LABURIN in the last 72 hours. Urine Chemistry: No results for input(s): Wilhemena Cliche, PROTEINUR, NAUR in the last 72 hours. IMAGING:  US HEAD NECK SOFT TISSUE THYROID   Final Result   Small thyroid with single nodule. No parathyroid adenoma identified. RECOMMENDATIONS:   NODULE 1:  ACR TI-RADS TR4:      Recommend:  No follow-up. ACR TI-RADS recommendations:      TR5 (>= 7 points):  FNA if >= 1 cm; follow-up if 0.5-0.9 cm in 1, 2, 3, 4,   and 5 years      TR4 (4-6 points):  FNA if >= 1.5 cm; follow-up if 1.0-1.4 cm in 1, 2, 3, and   5 years      TR3 (3 points):  FNA if >= 2.5 cm; follow-up if 1.5-2.4 cm in 1, 3, and 5   years      TR2 (2 points):  No FNA or follow-up      TR1 (0 points):  No FNA or follow-up      ACR TI-RADS recommends that no more than two nodules with the highest ACR   TI-RADS point total should be biopsied and no more than four nodules should   be followed. CT CHEST WO CONTRAST   Final Result   Right lower lobe pneumonia. Assessment/Plan     1. Hypercalcemia       Ca improving    2. CKD stage 4    3. Acute metabolic encephalopathy    4. CAD    5.  Hypernatremia      Plan    Change to hypotonic fluids with sod bicarb    Given calcitonin bid x 2 doses total    PTH, 25 D in range  PTHrP pending    GOC discussion       Avoid nephrotoxin              Thank you for allowing us to participate in care of Veena Avendaño MD  Feel free to contact me   [facility] of [address]  Office : [phone]  Fax :[phone]

## 2021-05-31 NOTE — DISCHARGE SUMMARY
Hospital Medicine Discharge Summary    Patient ID: Stoney Ermelinda      Patient's PCP: Doyle Khanna MD    Admit Date: 5/27/2021     Discharge Date:   5/31/2021    Admitting Physician: Matthew Rdz MD     Discharge Physician: Abdirahman Pearson MD     Discharge Diagnoses: Active Hospital Problems    Diagnosis     ANGEL (acute kidney injury) ([facility] [address] [N17.9]     Unresponsive [R41.89]        The patient was seen and examined on day of discharge and this discharge summary is in conjunction with any daily progress note from day of discharge. Hospital Course:   80 y.o. male with chronic jose, cad, htn, chf, pacer(due to bradycardia) who presented to [facility] with unresponsiveness. Pt was recently discharged on 5/13/21 where he was treated for pna, encephalopathy, and hypercalcemia. Pt went back to [address] [place] [address] via ambulette. He was able to walk to the bathroom and took a shower with help from family and through use of a walker. On Wednesday, family had to help him up. He managed to eat ensure and icecream and managed to have some soup last night as well as icecream around 2300. Today, he only ate a few bites of breakfast.  [place] [address] nurse came by and was concerned about significant decline in the past 1 week so recommended evaluation at hospital.  -pt has not been very coherent and interactive, except with wife. Due to progressive decline in health despite medical therapy, family decision to accept Hospice care.      Patient was transferred to Hospice unit . Physical Exam Performed:     BP (!) 163/78   Pulse 66   Temp 96.2 °F (35.7 °C) (Axillary)   Resp 18   Ht 5' 5\" (1.651 m)   SpO2 96%   BMI 18.80 kg/m²   General appearance:  No apparent distress, Thin/frail/cachetic appearing. Non responsive. HEENT:  Normal cephalic, atraumatic without obvious deformity. Pupils equal, round,. Conjunctivae/corneas clear. Neck: Supple, with full range of motion. No jugular venous distention.  Trachea midline. Respiratory:  Normal respiratory effort. Clear to auscultation, bilaterally without Rales/Wheezes/Rhonchi. Cardiovascular:  Regular rate and rhythm with normal S1/S2 without murmurs, rubs or gallops. Abdomen: Soft, non-tender, non-distended with normal bowel sounds. Musculoskeletal:  No clubbing, cyanosis or edema bilaterally.  Full range of motion without deformity. Skin: Skin color, texture, turgor normal.  No rashes or lesions. Neurologic: Patient cannot participate in exam.   Psychiatric:  Unable to assess. Patient cannot participate. Capillary Refill: Brisk,3 seconds, normal  Peripheral Pulses: +2 palpable, equal bilaterally     Labs: For convenience and continuity at follow-up the following most recent labs are provided:      CBC:    Lab Results   Component Value Date    WBC 6.4 05/31/2021    HGB 9.8 05/31/2021    HCT 29.8 05/31/2021     05/31/2021       Renal:    Lab Results   Component Value Date     05/31/2021    K 3.5 05/31/2021    K 3.9 05/06/2021     05/31/2021    CO2 19 05/31/2021    BUN 46 05/31/2021    CREATININE 3.0 05/31/2021    CALCIUM 10.2 05/31/2021    PHOS 1.5 05/31/2021         Significant Diagnostic Studies    Radiology:   US HEAD NECK SOFT TISSUE THYROID   Final Result   Small thyroid with single nodule. No parathyroid adenoma identified. RECOMMENDATIONS:   NODULE 1:  ACR TI-RADS TR4:      Recommend:  No follow-up.       ACR TI-RADS recommendations:      TR5 (>= 7 points):  FNA if >= 1 cm; follow-up if 0.5-0.9 cm in 1, 2, 3, 4,   and 5 years      TR4 (4-6 points):  FNA if >= 1.5 cm; follow-up if 1.0-1.4 cm in 1, 2, 3, and   5 years      TR3 (3 points):  FNA if >= 2.5 cm; follow-up if 1.5-2.4 cm in 1, 3, and 5   years      TR2 (2 points):  No FNA or follow-up      TR1 (0 points):  No FNA or follow-up      ACR TI-RADS recommends that no more than two nodules with the highest ACR   TI-RADS point total should be biopsied and no more than four nodules should   be followed. CT CHEST WO CONTRAST   Final Result   Right lower lobe pneumonia. Assessment/Plan:          Active Hospital Problems     Diagnosis      ANGEL (acute kidney injury) ([facility] [address] [N17.9]      Unresponsive [R41.89]        Failure to thrive- with increasing somnolence, dec'd appetite. Patient is not able to take in any oral medication per nursing.   -palliative care consulted, Pt is a DNR CC code. - hospice consulted 5/29 given Kath(hcpoa) is back in town to discuss along with pt' s wife  -supportive care at this time  -will give morphine prn iv only if pt distressed  -will give ativan prn for restlessness or aggitation.      Hypercalcemia- Improved, likely from renal dz, was previously given calcitonin x 2 doses on prior admit  -gentle ivfs completed  -given calcitonin and  zoledronic acid on 5/27   -nephro consulted and following patient. Appreciate recommendations.      Abn UA-likely from chronic indwelling jose  -completed Abx in hospital.      CAD--Unable to take asa/statin/bb/imdur    HTN- Unable to take in bp meds    GERD- Unable to take in ppi    Hypothyroid- Unable to take oral synthroid            Consults:     IP CONSULT TO PALLIATIVE CARE  IP CONSULT TO HOSPITALIST  IP CONSULT TO NEPHROLOGY  IP CONSULT TO DIETITIAN  IP CONSULT TO HOSPICE    Disposition: Hospice    Condition at Discharge: Terminal    Discharge Instructions/Follow-up:  Hospice evaluation    Code Status:  DNR CC    Activity: activity as tolerated    Diet: Pleasure feeds if desired      Discharge Medications:     Current Discharge Medication List           Details   LORazepam (ATIVAN) 0.5 MG tablet Take 1 tablet by mouth every 6 hours as needed for Anxiety for up to 30 days. Qty: 10 tablet, Refills: 0    Associated Diagnoses:  Altered mental status, unspecified altered mental status type             Time Spent on discharge is more than 45 minutes in the examination, evaluation, counseling and review of medications and discharge plan. Signed:    Indy Lugo MD   5/31/2021      Thank you Jayshree Robin MD for the opportunity to be involved in this patient's care. If you have any questions or concerns please feel free to contact me at [phone].

## 2021-05-31 NOTE — PROGRESS NOTES
Hospitalist Progress Note      PCP: Andres Lopez MD    Date of Admission: 5/27/2021    Chief Complaint: unresponsive     Hospital Course: 80 y. o. 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 Palliative care consulted.     Subjective:   Patient responding to voice, but has not been following many commands. Phlebotomy at bedside and patient did not withdraw from pain. DC planning: Palliative care has been consulted. Family in disagreement regarding final stages of care. Daughter Kath(HCPOA) maintaining to continue treatment for acute PNA. ( IV abx: Doxy and Rocephin). Goal is to attempt reversal of acute conservative medical treatments. Per Nursing, unable to safely administer meds through oral route. He has not been able to stay awake or participate in his care. Patient has not taken in any food since admission. Family at bedside was able to encourage spoonfuls of food.       HOC nurse: Plan to call family and start enrollment. Further discussions regarding placement and goals of care. Rounding with Nursing and [facility] nurse at bedside.      Medications:  Reviewed    Infusion Medications    sodium bicarbonate infusion      sodium chloride 25 mL (05/28/21 0822)     Scheduled Medications    isosorbide mononitrate  30 mg Oral Daily    metoprolol succinate  12.5 mg Oral Daily    saccharomyces boulardii  250 mg Oral BID    doxycycline (VIBRAMYCIN) IV  100 mg Intravenous Q12H    cefTRIAXone (ROCEPHIN) IV  1,000 mg Intravenous Q24H    levothyroxine  88 mcg Oral Daily    sodium chloride flush  5-40 mL Intravenous 2 times per day    enoxaparin  30 mg Subcutaneous Daily     PRN Meds: HYDROmorphone, LORazepam, sodium chloride flush, sodium chloride, promethazine **OR** ondansetron, polyethylene glycol, acetaminophen **OR** acetaminophen      Intake/Output Summary (Last 24 hours) at 5/31/2021 [address] [place] filed at 5/31/2021 1042  Gross per 24 hour   Intake 2534 ml   Output 2025 ml   Net 509 ml       Physical Exam Performed:    BP (!) 170/80   Pulse 66   Temp 96.8 °F (36 °C) (Axillary)   Resp 16   Ht 5' 5\" (1.651 m)   SpO2 96%   BMI 18.80 kg/m²      General appearance:  No apparent distress, appears stated age and cooperative. Thin/frail/cachetic appearing. Non responsive. HEENT:  Normal cephalic, atraumatic without obvious deformity. Pupils equal, round,. Conjunctivae/corneas clear. Neck: Supple, with full range of motion. No jugular venous distention. Trachea midline. Respiratory:  Normal respiratory effort. Clear to auscultation, bilaterally without Rales/Wheezes/Rhonchi. Cardiovascular:  Regular rate and rhythm with normal S1/S2 without murmurs, rubs or gallops. Abdomen: Soft, non-tender, non-distended with normal bowel sounds. Musculoskeletal:  No clubbing, cyanosis or edema bilaterally.  Full range of motion without deformity. Skin: Skin color, texture, turgor normal.  No rashes or lesions. Neurologic: Patient cannot participate in exam. Psychiatric:  Unable to assess. Patient cannot participate. Capillary Refill: Brisk,3 seconds, normal  Peripheral Pulses: +2 palpable, equal bilaterally          Labs:   Recent Labs     05/29/21  0628 05/30/21  0550 05/31/21  0629   WBC 7.2 6.1 6.4   HGB 9.7* 9.1* 9.8*   HCT 29.6* 27.5* 29.8*    193 194     Recent Labs     05/29/21  1017 05/30/21  0550 05/31/21  0629   * 150* 148*   K 4.0 3.9 3.5   * 120* 119*   CO2 20* 20* 19*   BUN 55* 51* 46*   CREATININE 3.0* 3.1* 3.0*   CALCIUM 11.9* 10.6 10.2   PHOS  --  2.0* 1.5*     No results for input(s): AST, ALT, BILIDIR, BILITOT, ALKPHOS in the last 72 hours. No results for input(s): INR in the last 72 hours. No results for input(s): Noah Moe in the last 72 hours.     Urinalysis:      Lab Results   Component Value Date    NITRU Negative 05/27/2021    WBCUA >100 05/27/2021    BACTERIA Rare 05/27/2021    RBCUA 11-20 05/27/2021    BLOODU MODERATE 05/27/2021    SPECGRAV 1.025 05/27/2021    GLUCOSEU Negative 05/27/2021       Radiology:  US HEAD NECK SOFT TISSUE THYROID   Final Result   Small thyroid with single nodule. No parathyroid adenoma identified. RECOMMENDATIONS:   NODULE 1:  ACR TI-RADS TR4:      Recommend:  No follow-up. ACR TI-RADS recommendations:      TR5 (>= 7 points):  FNA if >= 1 cm; follow-up if 0.5-0.9 cm in 1, 2, 3, 4,   and 5 years      TR4 (4-6 points):  FNA if >= 1.5 cm; follow-up if 1.0-1.4 cm in 1, 2, 3, and   5 years      TR3 (3 points):  FNA if >= 2.5 cm; follow-up if 1.5-2.4 cm in 1, 3, and 5   years      TR2 (2 points):  No FNA or follow-up      TR1 (0 points):  No FNA or follow-up      ACR TI-RADS recommends that no more than two nodules with the highest ACR   TI-RADS point total should be biopsied and no more than four nodules should   be followed. CT CHEST WO CONTRAST   Final Result   Right lower lobe pneumonia. Assessment/Plan:    Active Hospital Problems    Diagnosis     ANGEL (acute kidney injury) ([facility] [address] [N17.9]     Unresponsive [R41.89]      Failure to thrive- with increasing somnolence, dec'd appetite. VSS. Patient is not able to take in any oral medication per nursing.   -palliative care consulted, see extensive documentation after discussion with family and KATHY(Kath).  Pt is a LIMITED code.   - [facility] to discuss along with pt' s wife  -supportive care at this time  -will give morphine prn iv only if pt distressed   -doxycycline and rocephin started overnight for possible pna   -SLP eval ordered  -CT chest ordered in 5/28 overnight, concern for pna, doxycyline iv added to regimen along with rocephin    Hypercalcemia- Improved, likely from renal dz, was previously given calcitonin x 2 doses on prior admit  -gentle ivfs started here  -given dose of calcitonin and  zoledronic acid on 5/27   -nephro consulted  -Spep ordered and free light chains     Abn UA-likely from chronic indwelling jose  -monitor for f/chills  -given dose of rocephin iv on 5/27, continued      CAD--Unable to take asa/statin/bb/imdur  HTN- Unable to take in bp meds  GERD- Unable to take in ppi  Hypothyroid- Unable to take oral synthroid       DVT Prophylaxis: lovenox  Diet: Dietary Nutrition Supplements: Standard High Calorie Oral Supplement, Frozen Oral Supplement  DIET DYSPHAGIA PUREED; Moderately Thick (Honey)  Code Status: Limited    PT/OT Eval Status: Patient cannot participate in evaluation.      Dispo - Hospice consulted, pending discussions, continue ivfs and abx    Kina Calvin MD

## 2021-05-31 NOTE — PROGRESS NOTES
Pt assessment completed and documented. VSS on RA. FAUSTO orientation, patient is alert to voice. Chronic jose in place draining cloudy yellow urine. D5/0.45NS infusing @ 100mL/hr. Pt medicated for discomfort and anxiety per MAR. Bed in lowest position and wheels locked. Bed check is on. Avasys in place for safety to make sure patient doesn't pull at tubes and lines. Call light and bedside table within reach. Pt denies any other needs at this time.

## 2021-05-31 NOTE — PROGRESS NOTES
Speech Language Pathology    SLP attempted dysphagia f/u, spoke with RN. Pt RN, pt largely unresponsive. Pt may be discharged to inpatient hospice later this date per RN. ST to sign-off, please re-refer if changes occur.     Corinne Gilmore M.S. Marito Samuel  Speech-language pathologist  [phone]

## 2021-05-31 NOTE — PROGRESS NOTES
Pt discharged to hospice. POA declining any medication of ativan or dilaudid prior to transfer even though pt was grimacing in pain, even after explanation by both writer and transport of use of medication for patient comfort. Per POA pt is \"awake and eating\" and they plan on visiting him when he gets there and they want to keep him awake and eating. IV removed without complication. Ativan prescription sealed in medication bag and sent with transport. Patients family took belongings. Left floor via transport.

## 2021-05-31 NOTE — CARE COORDINATION
Case Management Discharge Summary- Hospice Discharge    Destination: [address] [place] [address] Agency name and contact: Flavio [phone]    Durable Equipment arrangements are completed by the Hospice nurse. [facility] DNR signed and placed in discharge packet AND patient's hard chart. (This is required for DNR patients.)    Signed ECOC/AVS faxed to above agency/facility. Transportation arrangements per [employer].  time:1745 pending negative rapid COVID     Transport form completed and signed by:  RN  Transport form placed with discharge packet: Chart    Notified Family    Patient's RN notified of plan: [facility], RN     Note:    Discharging nurse to complete nursing portion of ECOC, reconcile AVS, place final copy with patient's discharge packet. RN to ensure signed prescriptions are sent home with patient or the facility as per nursing protocol.   TATUM Pena

## 2021-05-31 NOTE — PROGRESS NOTES
Pt becoming more restless. .. starting to pull at lines, removing his linens and gown, and repeatedly attempting to get out of bed. Redirection and repositioning attempted several times without success. Ativan 0.25 mg IVP administered per MAR. Avasys monitor remains active for safety.

## 2021-05-31 NOTE — PROGRESS NOTES
Pt responds to voice. Assessment completed and documented. All oral meds held out of safety due to risk of aspiration. Avasys in place for safety. Will continue to monitor.

## 2021-06-07 ENCOUNTER — TELEPHONE (OUTPATIENT)
Dept: PULMONOLOGY | Age: 86
End: 2021-06-07

## 2021-06-07 NOTE — TELEPHONE ENCOUNTER
Called to check patient in for his appointment today and his daughter stated he passed away in [address] [place] [address] on 6/5/21

## 2021-06-11 LAB — PTH RELATED PEPTIDE: 9.4 PMOL/L (ref 0–2.3)

## 2021-06-14 LAB — PTH RELATED PEPTIDE: 5.4 PMOL/L (ref 0–2.3)

## 2022-06-29 NOTE — TELEPHONE ENCOUNTER
Writer contacted ED provider Kirit Khalil  to inform of 30 day readmission risk. ED provider informed writer of intention to discharge with hospice care.
Unremarkable
Unremarkable

## (undated) DEVICE — ENDO CARRY-ON PROCEDURE KIT INCLUDES SUCTION TUBING, LUBRICANT, GAUZE, BIOHAZARD STICKER, TRANSPORT PAD AND INTERCEPT BEDSIDE KIT.: Brand: ENDO CARRY-ON PROCEDURE KIT

## (undated) DEVICE — ELECTRODE,RADIOTRANSLUCENT,FOAM,3PK: Brand: MEDLINE

## (undated) DEVICE — CONMED SCOPE SAVER BITE BLOCK, 20X27 MM: Brand: SCOPE SAVER

## (undated) DEVICE — ELECTRODE ECG MONITR FOAM TEAR DROP ADLT RED